# Patient Record
Sex: FEMALE | Race: WHITE | Employment: OTHER | URBAN - METROPOLITAN AREA
[De-identification: names, ages, dates, MRNs, and addresses within clinical notes are randomized per-mention and may not be internally consistent; named-entity substitution may affect disease eponyms.]

---

## 2018-10-19 ENCOUNTER — OFFICE VISIT (OUTPATIENT)
Dept: CARDIOLOGY CLINIC | Age: 78
End: 2018-10-19

## 2018-10-19 VITALS
SYSTOLIC BLOOD PRESSURE: 170 MMHG | BODY MASS INDEX: 34.31 KG/M2 | RESPIRATION RATE: 20 BRPM | DIASTOLIC BLOOD PRESSURE: 88 MMHG | WEIGHT: 201 LBS | HEART RATE: 78 BPM | OXYGEN SATURATION: 97 % | HEIGHT: 64 IN

## 2018-10-19 DIAGNOSIS — I10 ESSENTIAL HYPERTENSION: Primary | ICD-10-CM

## 2018-10-19 DIAGNOSIS — E66.09 CLASS 1 OBESITY DUE TO EXCESS CALORIES WITH SERIOUS COMORBIDITY AND BODY MASS INDEX (BMI) OF 34.0 TO 34.9 IN ADULT: ICD-10-CM

## 2018-10-19 RX ORDER — LISINOPRIL 10 MG/1
TABLET ORAL DAILY
COMMUNITY
End: 2018-10-19 | Stop reason: SDUPTHER

## 2018-10-19 RX ORDER — SIMVASTATIN 10 MG/1
TABLET, FILM COATED ORAL
COMMUNITY
End: 2020-12-06

## 2018-10-19 RX ORDER — LISINOPRIL 20 MG/1
20 TABLET ORAL DAILY
Qty: 90 TAB | Refills: 3
Start: 2018-10-19 | End: 2021-05-13 | Stop reason: SDUPTHER

## 2018-10-19 RX ORDER — BISOPROLOL FUMARATE 5 MG/1
5 TABLET ORAL DAILY
COMMUNITY
End: 2020-06-16

## 2018-10-19 RX ORDER — AMLODIPINE BESYLATE 10 MG/1
10 TABLET ORAL DAILY
Qty: 90 TAB | Refills: 3
Start: 2018-10-19 | End: 2018-12-06

## 2018-10-19 NOTE — PATIENT INSTRUCTIONS
Increase Lisinopril to 20 mg/day starting today. Increase Amlodipine to 10 mg/day starting today. From tomorrow, take Amlodipine in the morning and Bisoprolol and Lisinopril before bed  Follow a low sodium diet  Monitor your BP daily and write down values. Call in 2 weeks and tell Jack Pittet your blood pressures  Drink 4-6 8 oz glasses of water per day    [employer] is a chat platform to communicate between your doctor (myself) and you about any medical issues. It is free to join. Please use this for non urgent medical concerns.  If you need me urgently, please call the office at [phone] or call [phone] if it is an emergency    Go to your mobile phone, your activation code specific to me is - [email or web address]

## 2018-10-19 NOTE — PROGRESS NOTES
Visit Vitals  /88   Pulse 78   Resp 20   Ht 5' 4\" (1.626 m)   Wt 201 lb (91.2 kg)   SpO2 97%   BMI 34.50 kg/m²     EKG VO     Headache for 4days  ER visit /98  [facility]

## 2018-10-19 NOTE — PROGRESS NOTES
Carmelo Edge MD [facility]  [address]  [place], [address] [facility]    Office [phone]  Fax [phone]  Cell [phone]    Franck Oneal is a 76 y.o. female self-referred for evaluation of accelerated HTN. Assessment  Encounter Diagnoses   Name Primary?  Essential hypertension Yes    Class 1 obesity due to excess calories with serious comorbidity and body mass index (BMI) of 34.0 to 34.9 in adult      Recommendations:  Chronic HTN with accelerated HTN associated with headache. She is on a multidrug regimen, but on low doses of each drug. She does not have ALLY by hx and follows a fairly low sodium diet. Doubt we are dealing with secondary causes. Stress may be playing a role. Recent echo demonstrated normal LV function without LVH. She has no sxs of HF. Labs from earlier this month demonstrate normal renal function. Will increase Lisinopril to 20 mg/d and amlodipine to 10 mg/d. Continue Bisoprolol 5 mg/d. Instructed her to dose amlodipine in the morning and the other two in the evening. Monitor BP BID. Phone follow up in a few weeks. Follow-up Disposition:  Return in about 4 weeks (around 11/16/2018). Subjective:  Chronic HTN but otherwise no cardiac hx. She had a bout of accelerated HTN in 2/2017 and was started on Amlodipine. She had a second bout on 10/16/18 requiring ED evaluation at [facility]. Her blood pressure was 208/104 with associated with severe headache and nausea. Head CT was negative. Today, she states she continues to have a significant headache and nausea. Before this episode she had been going to the  3x/week, with no exertional symptoms. She has some decreased consumption of liquids due to nausea. She follows a fairly low-sodium diet. She has been under stress since 5/2018 due to family concerns. She has some intermittent ankle swelling and soreness.  Patient denies any exertional chest pain, dyspnea, palpitations, syncope, orthopnea, or paroxysmal nocturnal dyspnea. She does not have ALLY. She is here with her daughter. Cardiac risk factors   HTN yes  DM no  Smoking no    Cardiac testing  EKG 10/19/18 - SR 73, first degree AV block, , otherwise normal  Echo 6/13/18 - EF 72%, mild LAE    Past Medical History:   Diagnosis Date    Baker's cyst of knee     Left    Easy bruising     Hypercholesteremia     Hypertension     Left arm pain     Neck pain     Osteoarthritis of both knees     Rapid pulse     Right elbow pain     Right hand pain     Right lateral epicondylitis     Swelling of joint of left wrist     Thoracic kyphosis     Cervical muscle stiffness    Wears glasses         Current Outpatient Medications   Medication Sig Dispense Refill    lisinopril (PRINIVIL, ZESTRIL) 10 mg tablet Take  by mouth daily.  simvastatin (ZOCOR) 10 mg tablet Take  by mouth nightly.  bisoprolol (ZEBETA) 5 mg tablet Take 5 mg by mouth daily.  amLODIPine (NORVASC) 2.5 mg tablet Take 5 mg by mouth daily.  esomeprazole (NEXIUM) 40 mg capsule Take  by mouth daily.  CALCIUM CARB/VIT D3/MINERALS (CALCIUM-VITAMIN D PO) Take  by mouth.  FOLIC ACID/MULTIVITS-MIN/LUT (CENTRUM SILVER PO) Take  by mouth.  DOCOSAHEXANOIC ACID/EPA (FISH OIL PO) Take  by mouth.  atorvastatin (LIPITOR) 10 mg tablet Take  by mouth daily.  ASPIRIN PO Take 81 mg by mouth. Allergies   Allergen Reactions    Epinephrine Other (comments)     Fast heartbeat    Morphine Not Reported This Time    Novocain [Procaine] Other (comments)     Fast heartbeat    Shellfish Derived Diarrhea and Nausea and Vomiting          Review of Systems  Constitutional: Negative for fever, chills, malaise/fatigue and diaphoresis. Respiratory: Negative for cough, hemoptysis, sputum production, shortness of breath and wheezing.    Cardiovascular: Negative for chest pain, palpitations, orthopnea, claudication, leg swelling and PND.  Gastrointestinal: Negative for heartburn, nausea, vomiting, blood in stool and melena. Genitourinary: Negative for dysuria and flank pain. Musculoskeletal: Negative for joint pain and back pain. Skin: Negative for rash. Neurological: Negative for focal weakness, seizures, loss of consciousness, weakness and headaches. Endo/Heme/Allergies: Does not bruise/bleed easily. Psychiatric/Behavioral: Negative for memory loss. The patient does not have insomnia. Physical Exam    Visit Vitals  /88   Pulse 78   Resp 20   Ht 5' 4\" (1.626 m)   Wt 201 lb (91.2 kg)   SpO2 97%   BMI 34.50 kg/m²     Wt Readings from Last 3 Encounters:   10/19/18 201 lb (91.2 kg)   01/07/15 201 lb (91.2 kg)   12/10/14 201 lb (91.2 kg)      General - well developed well nourished  Neck - JVP normal, thyroid nl  Cardiac - normal S1, S2, no murmurs, rubs or gallops. No clicks  Vascular - carotids without bruits, radials, femorals and pedal pulses equal bilateral  Lungs - clear to auscultation bilaterals, no rales, wheezing or rhonchi  Abd - soft nontender, no HSM, no abd bruits  Extremities - no edema  Skin - no rash  Neuro - nonfocal  Psych - normal mood and affect      Cardiographics  EKG 10/19/18 - SR 73, first degree AV block, , otherwise normal  Echo 6/13/18 - EF 72%, mild LAE     Written by Davis Bermudez, as dictated by Dr. Merlyn Tapia.    Merlyn Tapia MD

## 2018-11-02 ENCOUNTER — TELEPHONE (OUTPATIENT)
Dept: CARDIOLOGY CLINIC | Age: 78
End: 2018-11-02

## 2018-11-02 NOTE — TELEPHONE ENCOUNTER
10/28   /68    74                     129/75    83  10/29         156/80     72                     121/72     68  10/30         137/74      68                     161/80     77  10/31         136/76     70                     141/74    72  11/1           144/75     71                     138/73    72   11/2        137/72       70    Pt BP AM and PM   With HR  Pt complained of some swelling in her feet. She has been keeping them elevated during the day with little improvement.     Earl Gottron advise

## 2018-12-03 ENCOUNTER — OFFICE VISIT (OUTPATIENT)
Dept: CARDIOLOGY CLINIC | Age: 78
End: 2018-12-03

## 2018-12-03 VITALS
HEIGHT: 64 IN | DIASTOLIC BLOOD PRESSURE: 80 MMHG | BODY MASS INDEX: 34.31 KG/M2 | HEART RATE: 66 BPM | WEIGHT: 201 LBS | RESPIRATION RATE: 16 BRPM | SYSTOLIC BLOOD PRESSURE: 156 MMHG

## 2018-12-03 DIAGNOSIS — I10 ESSENTIAL HYPERTENSION: Primary | ICD-10-CM

## 2018-12-03 RX ORDER — GUAIFENESIN AND PHENYLEPHRINE HCL 400; 10 MG/1; MG/1
500 TABLET ORAL
COMMUNITY

## 2018-12-03 NOTE — PROGRESS NOTES
Chief Complaint   Patient presents with    Hypertension    Medication Evaluation     F/U Meds     Leg Swelling     Visit Vitals  /80 (BP 1 Location: Left arm, BP Patient Position: Sitting)   Pulse 66   Resp 16   Ht 5' 4\" (1.626 m)   Wt 201 lb (91.2 kg)   BMI 34.50 kg/m²

## 2018-12-03 NOTE — PROGRESS NOTES
Carmelo Holley, [facility] [address]  [address]  [place], [address] [facility]    Office [phone]  Fax [phone]  Cell [phone]    Ghada Lozoya is a [de-identified] y.o. female. Last seen 2 months ago. Assessment  Encounter Diagnosis   Name Primary?  Essential hypertension Yes     Recommendations:  Chronic HTN, now controlled on intensified combination therapy, most recently with increased Lisinopril 20 mg/d. Amlodipine was initially inceased to 10 mg daily, but caused significant LE edema. She feels well at this time. No further treatment or evaluation is necessary. We did discuss the role of high potassium/magnesium foods as a benefit for HTN. Will extend her follow up to 6 months. Follow-up Disposition:  Return in about 6 months (around 6/3/2019). Subjective:  Ms. Gretchen Ibarra states she is doing well. She has not had any recurrent headaches. BPs in the morning over the past 10 days have been in the 120-140 range, similar in the afternoon. She has no dizziness. She has some LE edema that worsens in the evenings, which improved with decreased Amlodipine. Patient denies any exertional chest pain, dyspnea, palpitations, syncope, orthopnea, or paroxysmal nocturnal dyspnea. She is adherent with fish oil, tumeric and aspirin 81 mg daily. She is here with her daughter.      Cardiac risk factors   HTN yes  DM no  Smoking no    Cardiac testing  EKG 10/19/18 - SR 73, first degree AV block, , otherwise normal  Echo 6/13/18 - EF 72%, mild LAE    Past Medical History:   Diagnosis Date    Baker's cyst of knee     Left    Easy bruising     Hypercholesteremia     Hypertension     Left arm pain     Neck pain     Osteoarthritis of both knees     Rapid pulse     Right elbow pain     Right hand pain     Right lateral epicondylitis     Swelling of joint of left wrist     Thoracic kyphosis     Cervical muscle stiffness    Wears glasses         Current Outpatient Medications   Medication Sig Dispense Refill    apremilast (OTEZLA) 30 mg tab Take 30 mg by mouth daily.  turmeric root extract 500 mg cap Take 500 mg by mouth.  cyanocobalamin, vitamin B-12, (VITAMIN B-12 PO) Take 1,000 mcg by mouth.  cranberry fruit extract (CRANBERRY PO) Take  by mouth.  simvastatin (ZOCOR) 10 mg tablet Take  by mouth nightly.  bisoprolol (ZEBETA) 5 mg tablet Take 5 mg by mouth daily.  amLODIPine (NORVASC) 10 mg tablet Take 1 Tab by mouth daily. (Patient taking differently: Take 5 mg by mouth daily.) 90 Tab 3    lisinopril (PRINIVIL, ZESTRIL) 20 mg tablet Take 1 Tab by mouth daily. 90 Tab 3    esomeprazole (NEXIUM) 40 mg capsule Take  by mouth daily.  CALCIUM CARB/VIT D3/MINERALS (CALCIUM-VITAMIN D PO) Take  by mouth.  DOCOSAHEXANOIC ACID/EPA (FISH OIL PO) Take  by mouth.  FOLIC ACID/MULTIVITS-MIN/LUT (CENTRUM SILVER PO) Take  by mouth. Allergies   Allergen Reactions    Epinephrine Other (comments)     Fast heartbeat    Morphine Not Reported This Time    Novocain [Procaine] Other (comments)     Fast heartbeat    Shellfish Derived Diarrhea and Nausea and Vomiting          Review of Systems  Constitutional: Negative for fever, chills, malaise/fatigue and diaphoresis. Respiratory: Negative for cough, hemoptysis, sputum production, shortness of breath and wheezing. Cardiovascular: Negative for chest pain, palpitations, orthopnea, claudication, leg swelling and PND. Gastrointestinal: Negative for heartburn, nausea, vomiting, blood in stool and melena. Genitourinary: Negative for dysuria and flank pain. Musculoskeletal: Negative for joint pain and back pain. Skin: Negative for rash. Neurological: Negative for focal weakness, seizures, loss of consciousness, weakness and headaches. Endo/Heme/Allergies: Does not bruise/bleed easily. Psychiatric/Behavioral: Negative for memory loss. The patient does not have insomnia.       Physical Exam    Visit Vitals  /80 (BP 1 Location: Left arm, BP Patient Position: Sitting)   Pulse 66   Resp 16   Ht 5' 4\" (1.626 m)   Wt 201 lb (91.2 kg)   BMI 34.50 kg/m²     Wt Readings from Last 3 Encounters:   12/03/18 201 lb (91.2 kg)   10/19/18 201 lb (91.2 kg)   01/07/15 201 lb (91.2 kg)      General - well developed well nourished  Neck - JVP normal, thyroid nl  Cardiac - normal S1, S2, no murmurs, rubs or gallops. No clicks  Vascular - carotids without bruits, radials, femorals and pedal pulses equal bilateral  Lungs - clear to auscultation bilaterals, no rales, wheezing or rhonchi  Abd - soft nontender, no HSM, no abd bruits  Extremities - no edema  Skin - no rash  Neuro - nonfocal  Psych - normal mood and affect      Cardiographics  EKG 10/19/18 - SR 73, first degree AV block, , otherwise normal  Echo 6/13/18 - EF 72%, mild LAE     Written by Su Michel, as dictated by Dr. Gerri Fink.    Gerri Fink MD

## 2018-12-06 RX ORDER — AMLODIPINE BESYLATE 5 MG/1
5 TABLET ORAL DAILY
Qty: 30 TAB | Refills: 5
Start: 2018-12-06 | End: 2018-12-29 | Stop reason: SINTOL

## 2018-12-29 ENCOUNTER — TELEPHONE (OUTPATIENT)
Dept: CARDIOLOGY CLINIC | Age: 78
End: 2018-12-29

## 2018-12-29 RX ORDER — SPIRONOLACTONE 25 MG/1
25 TABLET ORAL DAILY
Qty: 30 TAB | Refills: 3 | Status: SHIPPED | OUTPATIENT
Start: 2018-12-29 | End: 2020-06-16

## 2018-12-29 RX ORDER — CHLORTHALIDONE 25 MG/1
25 TABLET ORAL DAILY
Qty: 30 TAB | Refills: 3 | Status: SHIPPED | OUTPATIENT
Start: 2018-12-29 | End: 2020-06-16

## 2018-12-29 NOTE — TELEPHONE ENCOUNTER
Intolerant of amlodipine due to worsening edema.      Will replace with chlorthalidone 25/d and spironolactone 25/d    Continue lisinopril    Check BMP 10 days after starting Rx    Continue home BP monitoring

## 2019-06-24 ENCOUNTER — TELEPHONE (OUTPATIENT)
Dept: CARDIOLOGY CLINIC | Age: 79
End: 2019-06-24

## 2019-06-24 NOTE — TELEPHONE ENCOUNTER
Patient would like to know if she could be fit in to see Dr. Carline Moe on 9/30/19 along with her sister's Arya Dorsey) appointment that same day at 3:40 in order for them to coordinate transportation.    Phone: [phone]

## 2019-09-30 ENCOUNTER — OFFICE VISIT (OUTPATIENT)
Dept: CARDIOLOGY CLINIC | Age: 79
End: 2019-09-30

## 2019-09-30 VITALS
OXYGEN SATURATION: 97 % | HEART RATE: 66 BPM | DIASTOLIC BLOOD PRESSURE: 78 MMHG | HEIGHT: 64 IN | BODY MASS INDEX: 33.29 KG/M2 | WEIGHT: 195 LBS | SYSTOLIC BLOOD PRESSURE: 120 MMHG

## 2019-09-30 DIAGNOSIS — I10 ESSENTIAL HYPERTENSION: Primary | ICD-10-CM

## 2019-09-30 RX ORDER — AMLODIPINE BESYLATE 2.5 MG/1
TABLET ORAL DAILY
COMMUNITY
End: 2020-06-16

## 2019-09-30 NOTE — LETTER
9/30/19 Patient: Jono Trivedi YOB: 1940 Date of Visit: 9/30/2019 Fazal Petty MD 
[address] [id number] VIA Facsimile: [phone] Dear Fazal Petty MD, Thank you for referring Ms. Rosa Sanchez to [facility] for evaluation. My notes for this consultation are attached. If you have questions, please do not hesitate to call me. I look forward to following your patient along with you. Sincerely, Umm Gil MD

## 2019-09-30 NOTE — PROGRESS NOTES
Patient says she has no cardiac complaints today       Visit Vitals  /78 (BP 1 Location: Left arm, BP Patient Position: Sitting)   Pulse 66   Ht 5' 4\" (1.626 m)   Wt 195 lb (88.5 kg)   SpO2 97%   BMI 33.47 kg/m²

## 2019-09-30 NOTE — PROGRESS NOTES
Dawna Andrews [address]  [address], [facility], [address] [facility]    Office [phone]  Fax [phone]  Cell [phone]      Jono Trivedi is a [de-identified] y.o. female. Last seen by me 10 months ago. Assessment  Encounter Diagnosis   Name Primary?  Essential hypertension Yes     Recommendations:    Chronic HTN, largely controlled with combination therapy. Continue Amlodipine 2.5mg, Zebeta 5mg Lisinopril 20mg. Occasional low BP. I encouraged that she stay hydrated. Will extend her follow up to 1 year, sooner PRN. Subjective:    Jono Trivedi reports she is feeling well overall. She said some days are better than others. Patient denies any exertional chest pain, dyspnea, palpitations, syncope, orthopnea, or paroxysmal nocturnal dyspnea. BP's at home have largely been in the 919-256 range systolic, sometimes 481'E. Occasionally she has had systolics in the 94C, symptomatic, improved with hydration. She is adherent with fish oil, tumeric and ASA 81mg/d. Cardiac risk factors   HTN yes  DM no  Smoking no    Cardiac testing  EKG 10/19/18 - SR 73, first degree AV block, , otherwise normal  Echo 6/13/18 - EF 72%, mild LAE    Past Medical History:   Diagnosis Date    Baker's cyst of knee     Left    Easy bruising     Hypercholesteremia     Hypertension     Left arm pain     Neck pain     Osteoarthritis of both knees     Rapid pulse     Right elbow pain     Right hand pain     Right lateral epicondylitis     Swelling of joint of left wrist     Thoracic kyphosis     Cervical muscle stiffness    Wears glasses         Current Outpatient Medications   Medication Sig Dispense Refill    amLODIPine (NORVASC) 2.5 mg tablet Take  by mouth daily.  apremilast (OTEZLA) 30 mg tab Take 30 mg by mouth daily.  turmeric root extract 500 mg cap Take 500 mg by mouth.  cyanocobalamin, vitamin B-12, (VITAMIN B-12 PO) Take 1,000 mcg by mouth.       cranberry fruit extract (CRANBERRY PO) Take  by mouth.  simvastatin (ZOCOR) 10 mg tablet Take  by mouth nightly.  bisoprolol (ZEBETA) 5 mg tablet Take 5 mg by mouth daily.  lisinopril (PRINIVIL, ZESTRIL) 20 mg tablet Take 1 Tab by mouth daily. 90 Tab 3    esomeprazole (NEXIUM) 40 mg capsule Take 20 mg by mouth daily.  CALCIUM CARB/VIT D3/MINERALS (CALCIUM-VITAMIN D PO) Take  by mouth.  DOCOSAHEXANOIC ACID/EPA (FISH OIL PO) Take  by mouth.  chlorthalidone (HYGROTEN) 25 mg tablet Take 1 Tab by mouth daily. 30 Tab 3    spironolactone (ALDACTONE) 25 mg tablet Take 1 Tab by mouth daily. 30 Tab 3    FOLIC ACID/MULTIVITS-MIN/LUT (CENTRUM SILVER PO) Take  by mouth. Allergies   Allergen Reactions    Epinephrine Other (comments)     Fast heartbeat    Morphine Not Reported This Time    Novocain [Procaine] Other (comments)     Fast heartbeat    Shellfish Derived Diarrhea and Nausea and Vomiting          Review of Systems  Constitutional: Negative for fever, chills, malaise/fatigue and diaphoresis. Respiratory: Negative for cough, hemoptysis, sputum production, shortness of breath and wheezing. Cardiovascular: Negative for chest pain, palpitations, orthopnea, claudication, leg swelling and PND. Gastrointestinal: Negative for heartburn, nausea, vomiting, blood in stool and melena. Genitourinary: Negative for dysuria and flank pain. Musculoskeletal: Negative for joint pain and back pain. Skin: Negative for rash. Neurological: Negative for focal weakness, seizures, loss of consciousness, weakness and headaches. Endo/Heme/Allergies: Does not bruise/bleed easily. Psychiatric/Behavioral: Negative for memory loss. The patient does not have insomnia.       Physical Exam    Visit Vitals  /78 (BP 1 Location: Left arm, BP Patient Position: Sitting)   Pulse 66   Ht 5' 4\" (1.626 m)   Wt 195 lb (88.5 kg)   SpO2 97%   BMI 33.47 kg/m²     Wt Readings from Last 3 Encounters:   09/30/19 195 lb (88.5 kg)   12/03/18 201 lb (91.2 kg)   10/19/18 201 lb (91.2 kg)      General - well developed well nourished  Neck - JVP normal, thyroid nl  Cardiac - normal S1, S2, no murmurs, rubs or gallops. No clicks  Vascular - carotids without bruits, radials, femorals and pedal pulses equal bilateral  Lungs - clear to auscultation bilaterals, no rales, wheezing or rhonchi  Abd - soft nontender, no HSM, no abd bruits  Extremities - no edema  Skin - no rash  Neuro - nonfocal  Psych - normal mood and affect      Cardiographics  EKG 10/19/18 - SR 73, first degree AV block, , otherwise normal  Echo 6/13/18 - EF 72%, mild LAE   EKG 9/30/19 - SR 71,     Written by Vishnu Richards, as dictated by Emily Molina M.D.      Emily Molina MD

## 2020-06-12 ENCOUNTER — TELEPHONE (OUTPATIENT)
Dept: CARDIOLOGY CLINIC | Age: 80
End: 2020-06-12

## 2020-06-12 NOTE — TELEPHONE ENCOUNTER
----- Message from Sam Peterson MD sent at 6/12/2020  9:40 AM EDT -----  Regarding: RE: Vascular order for venous duplex of RLE  Yes she needs one. Hx of DVT, followup study    ----- Message -----  From: Claudette Agreste, RN  Sent: 6/12/2020   9:25 AM EDT  To: Sam Peterson MD  Subject: FW: Vascular order for venous duplex of RLE      Did you order this? I don't see any notes either  ----- Message -----  From: Nieves Smith  Sent: 6/11/2020  11:35 AM EDT  To: Trena Carvalho RN  Subject: Vascular order for venous duplex of RLE          Katerine,  I do not see an order or a note for this patient to have a vascular study on 6/16. Could you please help   Me.     Thanks    [id number]

## 2020-06-16 ENCOUNTER — OFFICE VISIT (OUTPATIENT)
Dept: CARDIOLOGY CLINIC | Age: 80
End: 2020-06-16

## 2020-06-16 VITALS
SYSTOLIC BLOOD PRESSURE: 134 MMHG | DIASTOLIC BLOOD PRESSURE: 82 MMHG | WEIGHT: 189 LBS | HEART RATE: 64 BPM | BODY MASS INDEX: 32.27 KG/M2 | OXYGEN SATURATION: 98 % | HEIGHT: 64 IN

## 2020-06-16 DIAGNOSIS — I10 ESSENTIAL HYPERTENSION: Primary | ICD-10-CM

## 2020-06-16 DIAGNOSIS — I82.4Z1 ACUTE DEEP VEIN THROMBOSIS (DVT) OF DISTAL VEIN OF RIGHT LOWER EXTREMITY (HCC): ICD-10-CM

## 2020-06-16 DIAGNOSIS — I83.93 VARICOSE VEINS OF BOTH LOWER EXTREMITIES, UNSPECIFIED WHETHER COMPLICATED: ICD-10-CM

## 2020-06-16 RX ORDER — AMLODIPINE BESYLATE 2.5 MG/1
2.5 TABLET ORAL 2 TIMES DAILY
Qty: 60 TAB | Refills: 3
Start: 2020-06-16 | End: 2020-11-03 | Stop reason: DRUGHIGH

## 2020-06-16 NOTE — PROGRESS NOTES
Carmelo Craig MD [facility]  [address], [facility], [address] [facility]    Office [phone]  Fax [phone]  Cell [phone]      Noris Montgomery is a 78 y.o. female. Last seen by me 10 months ago. Assessment  Encounter Diagnoses   Name Primary?  Essential hypertension Yes    Acute deep vein thrombosis (DVT) of distal vein of right lower extremity (HCC)      Recommendations:    Chronic HTN, largely controlled with combination therapy. - Continue Amlodipine 2.5mg, Lisinopril 20mg. Calf DVT, right. Has a lot of varicose veins. - take aspirin 81/d    Venous insufficiency. Suggest thigh high compression stockings    Palpitations, infrequent at present. At risk for AF due to age, HTN and LAE. If sx progress, will get 30 day monitor    Follow-up and Dispositions    · Return in about 1 year (around 6/16/2021). Subjective:    Noris Montgomery dx with right calf DVT Jan 2020 in [place], started on Eliquis. Also had superficial thrombophlebitis right thigh. Stopped Eliquis last month. Active without exertional sx. Patient denies any exertional chest pain, dyspnea, , syncope, orthopnea, or paroxysmal nocturnal dyspnea. BPs largely 694-346I systolic, occ 235/599Z    occ palpitations at night    occ leg cramps at night. No claudication.      Cardiac risk factors   HTN yes  DM no  Smoking no    Cardiac testing  EKG 10/19/18 - SR 73, first degree AV block, , otherwise normal  Echo 6/13/18 - EF 72%, mild LAE    Past Medical History:   Diagnosis Date    Baker's cyst of knee     Left    Easy bruising     Hypercholesteremia     Hypertension     Left arm pain     Neck pain     Osteoarthritis of both knees     Rapid pulse     Right elbow pain     Right hand pain     Right lateral epicondylitis     Swelling of joint of left wrist     Thoracic kyphosis     Cervical muscle stiffness    Wears glasses         Current Outpatient Medications   Medication Sig Dispense Refill    amLODIPine (NORVASC) 2.5 mg tablet Take 1 Tab by mouth two (2) times a day. 60 Tab 3    apremilast (OTEZLA) 30 mg tab Take 30 mg by mouth daily.  turmeric root extract 500 mg cap Take 500 mg by mouth.  cyanocobalamin, vitamin B-12, (VITAMIN B-12 PO) Take 1,000 mcg by mouth.  cranberry fruit extract (CRANBERRY PO) Take  by mouth.  simvastatin (ZOCOR) 10 mg tablet Take  by mouth nightly.  lisinopril (PRINIVIL, ZESTRIL) 20 mg tablet Take 1 Tab by mouth daily. 90 Tab 3    esomeprazole (NEXIUM) 40 mg capsule Take 20 mg by mouth daily.  CALCIUM CARB/VIT D3/MINERALS (CALCIUM-VITAMIN D PO) Take  by mouth.  DOCOSAHEXANOIC ACID/EPA (FISH OIL PO) Take  by mouth.  FOLIC ACID/MULTIVITS-MIN/LUT (CENTRUM SILVER PO) Take  by mouth. Allergies   Allergen Reactions    Epinephrine Other (comments)     Fast heartbeat    Morphine Not Reported This Time    Novocain [Procaine] Other (comments)     Fast heartbeat    Shellfish Derived Diarrhea and Nausea and Vomiting          Review of Systems  Constitutional: Negative for fever, chills, malaise/fatigue and diaphoresis. Respiratory: Negative for cough, hemoptysis, sputum production, shortness of breath and wheezing. Cardiovascular: Negative for chest pain, palpitations, orthopnea, claudication, and PND. Gastrointestinal: Negative for heartburn, nausea, vomiting, blood in stool and melena. Genitourinary: Negative for dysuria and flank pain. Musculoskeletal: Negative for joint pain and back pain. Skin: Negative for rash. Neurological: Negative for focal weakness, seizures, loss of consciousness, weakness and headaches. Endo/Heme/Allergies: Does not bruise/bleed easily. Psychiatric/Behavioral: Negative for memory loss. The patient does not have insomnia.       Physical Exam    Visit Vitals  /82 (BP 1 Location: Left arm, BP Patient Position: Sitting)   Pulse 64   Ht 5' 4\" (1.626 m)   Wt 189 lb (85.7 kg) SpO2 98%   BMI 32.44 kg/m²     Wt Readings from Last 3 Encounters:   06/16/20 189 lb (85.7 kg)   06/16/20 189 lb (85.7 kg)   09/30/19 195 lb (88.5 kg)      General - well developed well nourished  Neck - JVP normal, thyroid nl  Cardiac - normal S1, S2, no murmurs, rubs or gallops.  No clicks  Vascular - carotids without bruits, radials, femorals and pedal pulses equal bilateral  Lungs - clear to auscultation bilaterals, no rales, wheezing or rhonchi  Abd - soft nontender, no HSM, no abd bruits  Extremities - trace LE edema, + varicose veins  Skin - no rash  Neuro - nonfocal  Psych - normal mood and affect      Cardiographics  EKG 10/19/18 - SR 73, first degree AV block, , otherwise normal  Echo 6/13/18 - EF 72%, mild LAE   EKG 9/30/19 - SR 71,         Ramiro Najera MD

## 2020-06-16 NOTE — PROGRESS NOTES
Nimo Chandra is a 78 y.o. female    Visit Vitals  /82 (BP 1 Location: Left arm, BP Patient Position: Sitting)   Pulse 64   Ht 5' 4\" (1.626 m)   Wt 189 lb (85.7 kg)   SpO2 98%   BMI 32.44 kg/m²       Chief Complaint   Patient presents with    Hypertension       Chest pain no  SOB no  Dizziness no  Swelling no  Recent hospital visit no  Refills no

## 2020-07-17 ENCOUNTER — TELEPHONE (OUTPATIENT)
Dept: CARDIOLOGY CLINIC | Age: 80
End: 2020-07-17

## 2020-07-17 ENCOUNTER — CLINICAL SUPPORT (OUTPATIENT)
Dept: CARDIOLOGY CLINIC | Age: 80
End: 2020-07-17

## 2020-07-17 DIAGNOSIS — R00.2 PALPITATIONS: Primary | ICD-10-CM

## 2020-07-17 DIAGNOSIS — R00.2 PALPITATION: Primary | ICD-10-CM

## 2020-07-17 NOTE — TELEPHONE ENCOUNTER
----- Message from Heena Connell MD sent at 2020 10:02 PM EDT -----  Regard[facility] day loop  Linette     Can you send to her? Dx palpitations      Hi Dr Maik Balderas, I continue to feel like my heart skips a beat at times and then I feel a little dizzy. We discussed a monitor when I was there on . Could you mail me one with instructions to [address] [place] [address], [place], [email or web address]. Thanks so much.  [employer]

## 2020-08-12 RX ORDER — HYDROGEN PEROXIDE 3 %
20 SOLUTION, NON-ORAL MISCELLANEOUS 2 TIMES DAILY
Qty: 180 CAP | Refills: 1 | Status: SHIPPED | OUTPATIENT
Start: 2020-08-12 | End: 2020-11-03 | Stop reason: DRUGHIGH

## 2020-08-28 ENCOUNTER — TELEPHONE (OUTPATIENT)
Dept: CARDIOLOGY CLINIC | Age: 80
End: 2020-08-28

## 2020-08-28 NOTE — TELEPHONE ENCOUNTER
Cardiac testing  EKG 10/19/18 - SR 73, first degree AV block, , otherwise normal  Echo 6/13/18 - EF 72%, mild LAE    30 day event monitor - 7/24/20 - 8/22/20 - evidence of afib; variable rates 100 to 120's. <1% overall burden. No significant bradycardia or pauses. I have notified Ms. Sanchez that her monitor indicated the presence of atrial fibrillation. She continues to experience intermittent palpitations. No tachycardia, syncope or near syncope. She was previously advised to discontinue Bisoprolol, but she felt \"a weird feeling in her chest\" when she discontinued it completely, so she has continued to take 5 mg every other day. We discussed the role of anticoagulation to prevent stroke. Recommend Eliquis 5 mg BID. Recent CBC drawn by PCP 6/2020 show H/H 12.8/40.2 and creatinine of 1.0. Weight 85kg. She is hesitant to take Eliquis due to medication interactions with fish oil and tumeric. She also has taken Eliquis early this year due to an acute DVT. She tolerated it well, but \"just isn't sure about being on it long term\". She denies any current bleeding or bruising concerns on ASA. We discussed that we will move forward with scheduling an updated Echo to reassess LV function and atrial size with an office visit to review results and continue to discuss our recommendations for anticoagulation. She is agreeable with this plan. She was encouraged to call back with any additional questions or concerns prior to that time.

## 2020-08-31 ENCOUNTER — TELEPHONE (OUTPATIENT)
Dept: CARDIOLOGY CLINIC | Age: 80
End: 2020-08-31

## 2020-08-31 DIAGNOSIS — I48.91 ATRIAL FIBRILLATION, UNSPECIFIED TYPE (HCC): Primary | ICD-10-CM

## 2020-08-31 NOTE — TELEPHONE ENCOUNTER
Patient is calling to speak with [facility] as she was advised to call and speak with her per Kevin Bender. Please advise.     Phone:  [phone]

## 2020-08-31 NOTE — TELEPHONE ENCOUNTER
Patient's states she was told to schedule an echo? No order in system. Please advise.      Phone: [phone]

## 2020-10-26 ENCOUNTER — ANCILLARY PROCEDURE (OUTPATIENT)
Dept: CARDIOLOGY CLINIC | Age: 80
End: 2020-10-26
Payer: MEDICARE

## 2020-10-26 VITALS
DIASTOLIC BLOOD PRESSURE: 78 MMHG | HEIGHT: 64 IN | BODY MASS INDEX: 31.92 KG/M2 | SYSTOLIC BLOOD PRESSURE: 142 MMHG | WEIGHT: 187 LBS

## 2020-10-26 DIAGNOSIS — I48.91 ATRIAL FIBRILLATION, UNSPECIFIED TYPE (HCC): ICD-10-CM

## 2020-10-26 PROCEDURE — 93306 TTE W/DOPPLER COMPLETE: CPT | Performed by: SPECIALIST

## 2020-10-28 LAB
ECHO AO ROOT DIAM: 3.19 CM
ECHO AV AREA PEAK VELOCITY: 2.34 CM2
ECHO AV AREA VTI: 2.43 CM2
ECHO AV AREA/BSA PEAK VELOCITY: 1.2 CM2/M2
ECHO AV AREA/BSA VTI: 1.3 CM2/M2
ECHO AV MEAN GRADIENT: 3.8 MMHG
ECHO AV PEAK GRADIENT: 6.5 MMHG
ECHO AV PEAK VELOCITY: 127.49 CM/S
ECHO AV VTI: 29.48 CM
ECHO EST RA PRESSURE: 3 MMHG
ECHO LA AREA 4C: 19.89 CM2
ECHO LA MAJOR AXIS: 4.46 CM
ECHO LA MINOR AXIS: 2.35 CM
ECHO LA VOL 2C: 74.35 ML (ref 22–52)
ECHO LA VOL 4C: 53.89 ML (ref 22–52)
ECHO LA VOL BP: 70.35 ML (ref 22–52)
ECHO LA VOL/BSA BIPLANE: 37.01 ML/M2 (ref 16–28)
ECHO LA VOLUME INDEX A2C: 39.11 ML/M2 (ref 16–28)
ECHO LA VOLUME INDEX A4C: 28.35 ML/M2 (ref 16–28)
ECHO LV E' LATERAL VELOCITY: 10.39 CM/S
ECHO LV E' SEPTAL VELOCITY: 5.48 CM/S
ECHO LV INTERNAL DIMENSION DIASTOLIC: 4.81 CM (ref 3.9–5.3)
ECHO LV INTERNAL DIMENSION SYSTOLIC: 2.94 CM
ECHO LV IVSD: 0.95 CM (ref 0.6–0.9)
ECHO LV MASS 2D: 170.8 G (ref 67–162)
ECHO LV MASS INDEX 2D: 89.8 G/M2 (ref 43–95)
ECHO LV POSTERIOR WALL DIASTOLIC: 1.05 CM (ref 0.6–0.9)
ECHO LVOT DIAM: 1.9 CM
ECHO LVOT PEAK GRADIENT: 4.48 MMHG
ECHO LVOT PEAK VELOCITY: 105.86 CM/S
ECHO LVOT SV: 71.6 ML
ECHO LVOT VTI: 25.39 CM
ECHO MV A VELOCITY: 67.89 CM/S
ECHO MV AREA PHT: 3.53 CM2
ECHO MV E DECELERATION TIME (DT): 214.63 MS
ECHO MV E VELOCITY: 74.25 CM/S
ECHO MV E/A RATIO: 1.09
ECHO MV E/E' LATERAL: 7.15
ECHO MV E/E' RATIO (AVERAGED): 10.35
ECHO MV E/E' SEPTAL: 13.55
ECHO MV PRESSURE HALF TIME (PHT): 62.24 MS
ECHO RA AREA 4C: 16.51 CM2
ECHO RIGHT VENTRICULAR SYSTOLIC PRESSURE (RVSP): 27.39 MMHG
ECHO RV INTERNAL DIMENSION: 3.96 CM
ECHO RV TAPSE: 1.93 CM (ref 1.5–2)
ECHO TV REGURGITANT MAX VELOCITY: 246.91 CM/S
ECHO TV REGURGITANT PEAK GRADIENT: 24.39 MMHG
LA VOL DISK BP: 65.04 ML (ref 22–52)

## 2020-11-02 NOTE — PROGRESS NOTES
VIRTUAL VISIT DOCUMENTATION     Pursuant to the emergency declaration under the [facility] 2[facility] waiver authority and the via[employer] and [employer] Act, this Virtual  Visit was conducted, with patient's consent, to reduce the patient's risk of exposure to COVID-19 and provide continuity of care for an established patient. Services were provided through a video synchronous discussion virtually to substitute for in-person clinic visit. CHIEF COMPLAINT      James Schmidt is a 78 y.o. female who was seen by synchronous (real-time) audio-video technology on 11/3/2020. Last seen by me 5 months ago in clinic. Here to review recent event monitor. ASSESSMENT        ICD-10-CM ICD-9-CM    1. Paroxysmal atrial fibrillation (HCC)  I48.0 427.31    2. Essential hypertension  I10 401.9    3. Acute deep vein thrombosis (DVT) of distal vein of right lower extremity (HCC)  I82.4Z1 453.42         PLAN     Chronic HTN, largely controlled with combination therapy. - Continue Amlodipine 2.5mg, Lisinopril 10 mg and Ziac 5 mg every other day    - Continue home BP monitoring      PAF documented by event monitor. Recent echo w/ mild LAE. She has infrequent sxs. CHADS2-VASC score = 3. Favor long-term anticoagulation/ Eliquis; however, concerned about recent diarrhea w/ blood. Have asked her to touch base w/ Dr. Demetris Roper to see if further GI evaluation is appropriate before starting anticoagulation.   - Start Eliquis 5 mg BID once cleared by GI  - Stop ASA when starting Eliquis     Hx of right calf DVT Jan 2020 treated w/ Eliquis x3 months. 3 month f/u   __      We discussed the expected course, resolution and complications of the diagnosis(es) in detail. Medication risks, benefits, costs, interactions, and alternatives were discussed as indicated.   I advised her to contact the office if her condition worsens, changes or fails to improve as anticipated. She expressed understanding with the diagnosis(es) and plan    HISTORY OF PRESENTING ILLNESS      Underwent 30 day event monitor in July demonstrating episodes of AF w/ variable rates 100-120 w/ less than 1% overall burden. Repeat echo last month unchanged from 2018 w/ EF 60%, mild LAE. She reports no interval cardiac sxs. Patient denies any exertional chest pain, dyspnea, syncope, orthopnea, edema or paroxysmal nocturnal dyspnea. BP at home 120/70, 154/82, 125/70, 140/80 etc. She states that her BP rarely drops to 98/50 and she does not feel well. She takes bisoprolol 5 mg every other day, lisinopril 10 in the morning, amlodipine 2.5 in the evening. She reports diarrhea all night long Sunday which she attributes to eating too much on her birthday. She expresses some concern over possible blood in her stool. Her last colonoscopy was 10 years ago w/ Dr. Kasandra Werner. Dx with right calf DVT Jan 2020 in [place], started on Eliquis. Also had superficial thrombophlebitis right thigh.      She notes very easy bruising on Eliquis.         ACTIVE PROBLEM LIST     Patient Active Problem List    Diagnosis Date Noted    Paroxysmal atrial fibrillation ([address] 11/08/2020    Essential hypertension 06/16/2020    Acute deep vein thrombosis (DVT) of distal vein of right lower extremity ([address] 06/16/2020    Varicose veins of both lower extremities 06/16/2020           PAST MEDICAL HISTORY     Past Medical History:   Diagnosis Date    Acute deep vein thrombosis (DVT) of distal vein of right lower extremity ([address] 6/16/2020    Baker's cyst of knee     Left    Easy bruising     Essential hypertension 6/16/2020    Hypercholesteremia     Hypertension     Left arm pain     Neck pain     Osteoarthritis of both knees     Rapid pulse     Right elbow pain     Right hand pain     Right lateral epicondylitis     Swelling of joint of left wrist     Thoracic kyphosis     Cervical muscle stiffness    Wears glasses            PAST SURGICAL HISTORY     History reviewed. No pertinent surgical history. ALLERGIES     Allergies   Allergen Reactions    Epinephrine Other (comments)     Fast heartbeat    Morphine Not Reported This Time    Novocain [Procaine] Other (comments)     Fast heartbeat    Shellfish Derived Diarrhea and Nausea and Vomiting          FAMILY HISTORY     Family History   Problem Relation Age of Onset    Heart Disease Son     negative for cardiac disease       SOCIAL HISTORY     Social History     Socioeconomic History    Marital status:      Spouse name: Not on file    Number of children: Not on file    Years of education: Not on file    Highest education level: Not on file   Tobacco Use    Smoking status: Never Smoker    Smokeless tobacco: Never Used   Substance and Sexual Activity    Alcohol use: No         MEDICATIONS     Current Outpatient Medications   Medication Sig    amLODIPine (Norvasc) 2.5 mg tablet Take  by mouth daily.  esomeprazole (NexIUM) 20 mg capsule Take  by mouth daily.  apixaban (ELIQUIS) 5 mg tablet Take 1 Tab by mouth two (2) times a day. Indications: prevention for a blood clot going to the brain    apremilast (OTEZLA) 30 mg tab Take 30 mg by mouth daily.  turmeric root extract 500 mg cap Take 500 mg by mouth.  cyanocobalamin, vitamin B-12, (VITAMIN B-12 PO) Take 1,000 mcg by mouth.  cranberry fruit extract (CRANBERRY PO) Take  by mouth.  simvastatin (ZOCOR) 10 mg tablet Take  by mouth nightly.  lisinopril (PRINIVIL, ZESTRIL) 20 mg tablet Take 1 Tab by mouth daily.  CALCIUM CARB/VIT D3/MINERALS (CALCIUM-VITAMIN D PO) Take  by mouth.  FOLIC ACID/MULTIVITS-MIN/LUT (CENTRUM SILVER PO) Take  by mouth.  DOCOSAHEXANOIC ACID/EPA (FISH OIL PO) Take  by mouth. No current facility-administered medications for this visit.         I have reviewed the nurses notes, vitals, problem list, allergy list, medical history, family, social history and medications. REVIEW OF SYMPTOMS      General: Pt denies excessive weight gain or loss. Pt is able to conduct ADL's  HEENT: Denies blurred vision, headaches, hearing loss, epistaxis and difficulty swallowing. Respiratory: Denies cough, congestion, shortness of breath, CAMPBELL, wheezing or stridor. Cardiovascular: Denies precordial pain, palpitations, edema or PND  Gastrointestinal: Denies poor appetite, abdominal pain +diarrhea, blood in stool  Genitourinary: Denies hematuria, dysuria, increased urinary frequency  Musculoskeletal: Denies joint pain or swelling from muscles or joints  Neurologic: Denies tremor, paresthesias, headache, or sensory motor disturbance  Psychiatric: Denies confusion, insomnia, depression  Integumentray: Denies rash, itching or ulcers. Hematologic: +easy bruising        PHYSICAL EXAMINATION      Due to this being a TeleHealth evaluation, many elements of the physical examination are unable to be assessed. General: Well developed, in no acute distress, cooperative and alert  HEENT: Pupils equal/round. No marked JVD visible on video. Respiratory: No audible wheezing, no signs of respiratory distress, lips non cyanotic  Extremities:  No edema  Neuro: A&Ox3, speech clear, no facial droop, answering questions appropriately  Skin: Skin color is normal. No rashes or lesions. Non diaphoretic on visible skin during exam       DIAGNOSTIC DATA      EKG 10/19/18 - SR 73, first degree AV block, , otherwise normal  Echo 6/13/18 - EF 72%, mild LAE    30 day event monitor - 7/24/20 - 8/22/20 - evidence of afib; variable rates 100 to 120's. <1% overall burden. No significant bradycardia or pauses.        LABORATORY DATA    No results found for: WBC, HGBPOC, HGB, HGBP, HCTPOC, HCT, PHCT, RBCH, PLT, MCV, HGBEXT, HCTEXT, PLTEXT, HGBEXT, HCTEXT, PLTEXT   No results found for: NA, K, CL, CO2, AGAP, GLU, BUN, CREA, BUCR, GFRAA, GFRNA, CA, TBIL, TBILI, AP, TP, ALB, GLOB, AGRAT, ALT FOLLOW-UP        Patient was made aware and verbalized understanding that an appointment will be scheduled for them for a virtual visit and/or office visit within the above time frame. Patient understanding his/her responsibility to call and change time/date if he/she so chooses. Thank you, Pradeep Walters MD for allowing me to participate in the care of [facility]. Please do not hesitate to contact me for further questions/concerns. Written by Luh Talley, as dictated by Efrem Gamez M.D. Efrem Gamez MD    [facility].  [facility] 104, [address] [facility], [address], [address] [place] [address]  [phone] / [phone] Fax  [phone] / [phone] Fax        Greater than 20 minutes was spent in direct video patient care, planning and chart review. This visit was conducted using [employer] telemedicine services.

## 2020-11-03 ENCOUNTER — VIRTUAL VISIT (OUTPATIENT)
Dept: CARDIOLOGY CLINIC | Age: 80
End: 2020-11-03
Payer: MEDICARE

## 2020-11-03 DIAGNOSIS — I82.4Z1 ACUTE DEEP VEIN THROMBOSIS (DVT) OF DISTAL VEIN OF RIGHT LOWER EXTREMITY (HCC): ICD-10-CM

## 2020-11-03 DIAGNOSIS — I10 ESSENTIAL HYPERTENSION: ICD-10-CM

## 2020-11-03 DIAGNOSIS — I48.0 PAROXYSMAL ATRIAL FIBRILLATION (HCC): Primary | ICD-10-CM

## 2020-11-03 PROCEDURE — 1090F PRES/ABSN URINE INCON ASSESS: CPT | Performed by: SPECIALIST

## 2020-11-03 PROCEDURE — G8427 DOCREV CUR MEDS BY ELIG CLIN: HCPCS | Performed by: SPECIALIST

## 2020-11-03 PROCEDURE — G8400 PT W/DXA NO RESULTS DOC: HCPCS | Performed by: SPECIALIST

## 2020-11-03 PROCEDURE — G0463 HOSPITAL OUTPT CLINIC VISIT: HCPCS | Performed by: SPECIALIST

## 2020-11-03 PROCEDURE — G8756 NO BP MEASURE DOC: HCPCS | Performed by: SPECIALIST

## 2020-11-03 PROCEDURE — 99214 OFFICE O/P EST MOD 30 MIN: CPT | Performed by: SPECIALIST

## 2020-11-03 PROCEDURE — G8432 DEP SCR NOT DOC, RNG: HCPCS | Performed by: SPECIALIST

## 2020-11-03 PROCEDURE — 1101F PT FALLS ASSESS-DOCD LE1/YR: CPT | Performed by: SPECIALIST

## 2020-11-03 RX ORDER — HYDROGEN PEROXIDE 3 %
SOLUTION, NON-ORAL MISCELLANEOUS DAILY
COMMUNITY
End: 2021-03-26 | Stop reason: SDUPTHER

## 2020-11-03 RX ORDER — AMLODIPINE BESYLATE 2.5 MG/1
TABLET ORAL DAILY
COMMUNITY
End: 2021-03-13

## 2020-11-03 NOTE — PATIENT INSTRUCTIONS
- Call [phone] to schedule a follow up appointment in 3 months - Do not start Eliquis until we have sorted out your GI situation

## 2020-11-08 PROBLEM — I48.0 PAROXYSMAL ATRIAL FIBRILLATION (HCC): Status: ACTIVE | Noted: 2020-11-08

## 2020-11-08 RX ORDER — BISOPROLOL FUMARATE AND HYDROCHLOROTHIAZIDE 5; 6.25 MG/1; MG/1
1 TABLET ORAL DAILY
Qty: 90 TAB | Refills: 3
Start: 2020-11-08 | End: 2021-02-24

## 2020-11-24 ENCOUNTER — TELEPHONE (OUTPATIENT)
Dept: FAMILY MEDICINE CLINIC | Age: 80
End: 2020-11-24

## 2020-11-25 ENCOUNTER — TELEPHONE (OUTPATIENT)
Dept: FAMILY MEDICINE CLINIC | Age: 80
End: 2020-11-25

## 2020-11-25 DIAGNOSIS — Z12.31 ENCOUNTER FOR SCREENING MAMMOGRAM FOR MALIGNANT NEOPLASM OF BREAST: Primary | ICD-10-CM

## 2020-11-25 NOTE — TELEPHONE ENCOUNTER
Order placed for mammogram, per Verbal Order from Dr. Shala Garzon on 11/25/2020. Provider is aware of last labs, office visit and follow up. No further action requested from provider.

## 2020-12-06 RX ORDER — SIMVASTATIN 10 MG/1
TABLET, FILM COATED ORAL
Qty: 90 TAB | Refills: 3 | Status: SHIPPED | OUTPATIENT
Start: 2020-12-06 | End: 2022-03-17 | Stop reason: SDUPTHER

## 2021-01-04 ENCOUNTER — VIRTUAL VISIT (OUTPATIENT)
Dept: FAMILY MEDICINE CLINIC | Age: 81
End: 2021-01-04
Payer: MEDICARE

## 2021-01-04 DIAGNOSIS — E78.01 FAMILIAL HYPERCHOLESTEROLEMIA: ICD-10-CM

## 2021-01-04 DIAGNOSIS — E55.9 HYPOVITAMINOSIS D: ICD-10-CM

## 2021-01-04 DIAGNOSIS — M06.9 RHEUMATOID ARTHRITIS, INVOLVING UNSPECIFIED SITE, UNSPECIFIED WHETHER RHEUMATOID FACTOR PRESENT (HCC): ICD-10-CM

## 2021-01-04 DIAGNOSIS — M06.9 RHEUMATOID ARTHRITIS, INVOLVING UNSPECIFIED SITE, UNSPECIFIED WHETHER RHEUMATOID FACTOR PRESENT (HCC): Primary | ICD-10-CM

## 2021-01-04 DIAGNOSIS — I10 ESSENTIAL (PRIMARY) HYPERTENSION: ICD-10-CM

## 2021-01-04 PROCEDURE — 99443 PR PHYS/QHP TELEPHONE EVALUATION 21-30 MIN: CPT | Performed by: FAMILY MEDICINE

## 2021-01-04 RX ORDER — PREDNISONE 20 MG/1
TABLET ORAL
Qty: 12 TAB | Refills: 0 | Status: SHIPPED | OUTPATIENT
Start: 2021-01-04 | End: 2021-02-24

## 2021-01-04 RX ORDER — FUROSEMIDE 20 MG/1
TABLET ORAL
Qty: 20 TAB | Refills: 1 | Status: SHIPPED | OUTPATIENT
Start: 2021-01-04 | End: 2022-02-09 | Stop reason: SDUPTHER

## 2021-01-04 NOTE — PROGRESS NOTES
Jazlyn Romero is a 78 y.o. female, evaluated via audio-only technology on 1/4/2021 for Joint Pain  . Assessment & Plan: Rheumatoid arthritis, hypertension, paroxysmal atrial fibrillation; the patient can be started on a short course of prednisone. She is at significant arthritis were going to continue to check her blood work including sed rate and complete lipid panel. Lasix needs to be refilled as well. This was a 25-minute visit with voice to voice communication the patient at her home I was in my office work will be obtained I will call the patient with results she may need to see rheumatology again patient continues to talk about family issues she continued to talk about issues in life she does not seem to have clinical anxiety or depression but she seems to be lonely during this Covid ask problem I have asked her to stay safe and healthy and away from other people. 12  Subjective: Patient is an 28-year-old female who is seen for evaluation. No nausea vomiting or diarrhea she has had worsening of her arthritis no fever or chills no chest pain or shortness of breath eating relatively well. Bowel movements have been appropriate. She would like Lasix refill for as needed use eating well. Bowel movements have been appropriate. She sleeps well. Sometimes is hard to get moving in the morning. She has had no falls or injuries. She sleeps relatively well she wonders if prednisone would help for short. She uses ibuprofen intermittently she has had no falls or injuries she has had a good holiday with family nobody at home has been sick. Prior to Admission medications    Medication Sig Start Date End Date Taking? Authorizing Provider   simvastatin (ZOCOR) 10 mg tablet TAKE 1 TABLET DAILY 12/6/20  Yes Mayank Polo MD   bisoprolol-hydroCHLOROthiazide [facility]) 5-6.25 mg per tablet Take 1 Tab by mouth daily.  11/8/20  Yes Maurilio Garcia MD   amLODIPine (Norvasc) 2.5 mg tablet Take  by mouth daily. Yes Provider, Historical   esomeprazole (NexIUM) 20 mg capsule Take  by mouth daily. Yes Provider, Historical   apixaban (ELIQUIS) 5 mg tablet Take 1 Tab by mouth two (2) times a day. Indications: prevention for a blood clot going to the brain 10/29/20  Yes Nathaniel Amaya MD   apremilast (OTEZLA) 30 mg tab Take 30 mg by mouth daily. Yes Provider, Historical   turmeric root extract 500 mg cap Take 500 mg by mouth. Yes Provider, Historical   cyanocobalamin, vitamin B-12, (VITAMIN B-12 PO) Take 1,000 mcg by mouth. Yes Provider, Historical   cranberry fruit extract (CRANBERRY PO) Take  by mouth. Yes Provider, Historical   lisinopril (PRINIVIL, ZESTRIL) 20 mg tablet Take 1 Tab by mouth daily. 10/19/18  Yes Nathaniel Amaya MD   CALCIUM CARB/VIT D3/MINERALS (CALCIUM-VITAMIN D PO) Take  by mouth. Yes Provider, Historical   FOLIC ACID/MULTIVITS-MIN/LUT (CENTRUM SILVER PO) Take  by mouth. Yes Provider, Historical   DOCOSAHEXANOIC ACID/EPA (FISH OIL PO) Take  by mouth. Yes Provider, Historical     Patient Active Problem List   Diagnosis Code    Essential hypertension I10    Acute deep vein thrombosis (DVT) of distal vein of right lower extremity (HCC) I82.4Z1    Varicose veins of both lower extremities I83.93    Paroxysmal atrial fibrillation (HCC) I48.0    Hypovitaminosis D E55.9    Familial hypercholesterolemia E78.01    Rheumatoid arthritis ([facility] M06.9       Review of Systems   Constitutional: Negative. HENT: Negative. Respiratory: Negative. Gastrointestinal: Negative. Musculoskeletal: Positive for back pain. Neurological: Negative. Psychiatric/Behavioral: Negative. No flowsheet data found. Maira Jensen, who was evaluated through a patient-initiated, synchronous (real-time) audio only encounter, and/or her healthcare decision maker, is aware that it is a billable service, with coverage as determined by her insurance carrier.  She provided verbal consent to proceed: n/a- consent obtained within past 12 months. She has not had a related appointment within my department in the past 7 days or scheduled within the next 24 hours.       Total Time: minutes: 21-30 minutes    Bekah Domínguez MD

## 2021-01-04 NOTE — PROGRESS NOTES
Inge Lombard presents today for   Chief Complaint   Patient presents with    Joint Pain         Depression Screening:  3 most recent PHQ Screens 1/4/2021   Little interest or pleasure in doing things Not at all   Feeling down, depressed, irritable, or hopeless Not at all   Total Score PHQ 2 0       Learning Assessment:  No flowsheet data found. Abuse Screening:  No flowsheet data found. Fall Risk  Fall Risk Assessment, last 12 mths 9/30/2019   Able to walk? Yes   Fall in past 12 months? No       ADL  No flowsheet data found. Health Maintenance reviewed and discussed and ordered per Provider. Health Maintenance Due   Topic Date Due    DTaP/Tdap/Td series (1 - Tdap) 10/29/1959    Shingrix Vaccine Age 50> (1 of 2) 10/29/1988    GLAUCOMA SCREENING Q2Y  10/29/2003    Bone Densitometry (Dexa) Screening  10/29/2003    Pneumococcal 65+ years (1 of 1 - PPSV23) 10/29/2003    Medicare Yearly Exam  03/14/2018    Lipid Screen  03/27/2020    Flu Vaccine (1) 09/01/2020   . Coordination of Care:  1. Have you been to the ER, urgent care clinic since your last visit? Hospitalized since your last visit? nio    2. Have you seen or consulted any other health care providers outside of the [address] [place] [address] since your last visit? Include any pap smears or colon screening.  no    Providers orders his own labs, orders for colonoscopy, mammograms and referrals as needed    Last UDS Checked no  Last Pain contract signed: no

## 2021-01-05 ENCOUNTER — HOSPITAL ENCOUNTER (OUTPATIENT)
Dept: LAB | Age: 81
Discharge: HOME | End: 2021-01-05
Payer: MEDICARE

## 2021-01-05 LAB
ALBUMIN SERPL-MCNC: 3.6 G/DL (ref 3.5–4.7)
ALBUMIN/GLOB SERPL: 1 {RATIO}
ALP SERPL-CCNC: 78 U/L (ref 38–126)
ALT SERPL-CCNC: 10 U/L (ref 3–52)
ANION GAP SERPL CALC-SCNC: 8 MMOL/L
AST SERPL W P-5'-P-CCNC: 15 U/L (ref 14–74)
BASOPHILS # BLD: 0 K/UL (ref 0–0.1)
BASOPHILS NFR BLD: 0 % (ref 0–2)
BILIRUB SERPL-MCNC: 0.7 MG/DL (ref 0.2–1)
BUN SERPL-MCNC: 13 MG/DL (ref 9–21)
BUN/CREAT SERPL: 16
CA-I BLD-MCNC: 9.5 MG/DL (ref 8.5–10.5)
CHLORIDE SERPL-SCNC: 104 MMOL/L (ref 94–111)
CO2 SERPL-SCNC: 28 MMOL/L (ref 21–33)
CREAT SERPL-MCNC: 0.8 MG/DL (ref 0.7–1.2)
EOSINOPHIL # BLD: 0.1 K/UL (ref 0–0.4)
EOSINOPHIL NFR BLD: 2 % (ref 0–5)
ERYTHROCYTE [DISTWIDTH] IN BLOOD BY AUTOMATED COUNT: 14.2 % (ref 11.6–14.5)
ERYTHROCYTE [SEDIMENTATION RATE] IN BLOOD: 74 MM/HR
GLOBULIN SER CALC-MCNC: 3.6 G/DL
GLUCOSE SERPL-MCNC: 92 MG/DL (ref 70–110)
HCT VFR BLD AUTO: 39.2 % (ref 35–45)
HGB BLD-MCNC: 11.6 G/DL (ref 12–16)
IMM GRANULOCYTES # BLD AUTO: 0 K/UL
IMM GRANULOCYTES NFR BLD AUTO: 0 %
LYMPHOCYTES # BLD: 1.5 K/UL (ref 0.9–3.6)
LYMPHOCYTES NFR BLD: 34 % (ref 21–52)
MCH RBC QN AUTO: 30.2 PG (ref 24–34)
MCHC RBC AUTO-ENTMCNC: 29.6 G/DL (ref 31–37)
MCV RBC AUTO: 102.1 FL (ref 74–97)
MONOCYTES # BLD: 0.4 K/UL (ref 0.05–1.2)
MONOCYTES NFR BLD: 8 % (ref 3–10)
NEUTS SEG # BLD: 2.5 K/UL (ref 1.8–8)
NEUTS SEG NFR BLD: 56 % (ref 40–73)
PLATELET # BLD AUTO: 244 K/UL (ref 135–420)
PMV BLD AUTO: 12.7 FL (ref 9.2–11.8)
POTASSIUM SERPL-SCNC: 4.6 MMOL/L (ref 3.2–5.1)
PROT SERPL-MCNC: 7.2 G/DL (ref 6.1–8.4)
RBC # BLD AUTO: 3.84 M/UL (ref 4.2–5.3)
SODIUM SERPL-SCNC: 140 MMOL/L (ref 135–145)
WBC # BLD AUTO: 4.5 K/UL (ref 4.6–13.2)

## 2021-01-05 PROCEDURE — 86141 C-REACTIVE PROTEIN HS: CPT

## 2021-01-05 PROCEDURE — 80053 COMPREHEN METABOLIC PANEL: CPT

## 2021-01-05 PROCEDURE — 82306 VITAMIN D 25 HYDROXY: CPT

## 2021-01-05 PROCEDURE — 85651 RBC SED RATE NONAUTOMATED: CPT

## 2021-01-05 PROCEDURE — 85025 COMPLETE CBC W/AUTO DIFF WBC: CPT

## 2021-01-05 PROCEDURE — 80061 LIPID PANEL: CPT

## 2021-01-05 PROCEDURE — 36415 COLL VENOUS BLD VENIPUNCTURE: CPT

## 2021-01-05 PROCEDURE — 86038 ANTINUCLEAR ANTIBODIES: CPT

## 2021-01-06 LAB
25(OH)D3 SERPL-MCNC: 39.2 NG/ML (ref 30–100)
CHOLEST SERPL-MCNC: 143 MG/DL
CRP SERPL HS-MCNC: >9.5 MG/L
HDLC SERPL-MCNC: 53 MG/DL
HDLC SERPL: 2.7 {RATIO} (ref 0–5)
LDLC SERPL CALC-MCNC: 76.2 MG/DL (ref 0–100)
LIPID PROFILE,FLP: NORMAL
TRIGL SERPL-MCNC: 69 MG/DL (ref ?–150)
VLDLC SERPL CALC-MCNC: 13.8 MG/DL

## 2021-01-07 LAB — ANA SER QL: NEGATIVE

## 2021-01-21 ENCOUNTER — TELEPHONE (OUTPATIENT)
Dept: FAMILY MEDICINE CLINIC | Age: 81
End: 2021-01-21

## 2021-01-21 NOTE — TELEPHONE ENCOUNTER
Patient today her JODI was normal the C-reactive protein elevated she is seen to rheumatologist they both tell her not to worry about this finding she is already in [place] and one in [place] she does not want to see another I actually placed her on a course of prednisone for myalgia I am thinking we may still need to see a different rheumatologist.  Is aware

## 2021-01-26 RX ORDER — BISOPROLOL FUMARATE 5 MG/1
TABLET ORAL
Qty: 180 TAB | Refills: 3 | Status: SHIPPED | OUTPATIENT
Start: 2021-01-26

## 2021-02-24 ENCOUNTER — VIRTUAL VISIT (OUTPATIENT)
Dept: FAMILY MEDICINE CLINIC | Age: 81
End: 2021-02-24
Payer: MEDICARE

## 2021-02-24 DIAGNOSIS — M06.9 RHEUMATOID ARTHRITIS, INVOLVING UNSPECIFIED SITE, UNSPECIFIED WHETHER RHEUMATOID FACTOR PRESENT (HCC): ICD-10-CM

## 2021-02-24 DIAGNOSIS — E78.01 FAMILIAL HYPERCHOLESTEROLEMIA: ICD-10-CM

## 2021-02-24 DIAGNOSIS — I82.4Z1 ACUTE DEEP VEIN THROMBOSIS (DVT) OF DISTAL VEIN OF RIGHT LOWER EXTREMITY (HCC): ICD-10-CM

## 2021-02-24 DIAGNOSIS — I10 ESSENTIAL HYPERTENSION: Primary | ICD-10-CM

## 2021-02-24 PROCEDURE — 99443 PR PHYS/QHP TELEPHONE EVALUATION 21-30 MIN: CPT | Performed by: FAMILY MEDICINE

## 2021-02-24 NOTE — PROGRESS NOTES
Ara Hay presents today for   Chief Complaint   Patient presents with    Follow-up         Depression Screening:  3 most recent PHQ Screens 2/24/2021   Little interest or pleasure in doing things Not at all   Feeling down, depressed, irritable, or hopeless Not at all   Total Score PHQ 2 0       Fall Risk  Fall Risk Assessment, last 12 mths 2/24/2021   Able to walk? Yes   Fall in past 12 months? 0   Do you feel unsteady? 0   Are you worried about falling 0       Health Maintenance reviewed and discussed and ordered per Provider. Health Maintenance Due   Topic Date Due    COVID-19 Vaccine (1 of 2) 10/29/1956    DTaP/Tdap/Td series (1 - Tdap) 10/29/1959    Shingrix Vaccine Age 50> (1 of 2) 10/29/1988    GLAUCOMA SCREENING Q2Y  10/29/2003    Bone Densitometry (Dexa) Screening  10/29/2003    Pneumococcal 65+ years (1 of 1 - PPSV23) 10/29/2003    Medicare Yearly Exam  03/14/2018    Flu Vaccine (1) 09/01/2020   . Coordination of Care:  1. Have you been to the ER, urgent care clinic since your last visit? Hospitalized since your last visit? No    2. Have you seen or consulted any other health care providers outside of the [address] [place] [address] since your last visit? Include any pap smears or colon screening.  No

## 2021-02-24 NOTE — PROGRESS NOTES
Rosanne Garcia is a 78 y.o. female, evaluated via audio-only technology on 2/24/2021 for Follow-up  . Assessment & Plan:hypertension, rheumatoid arthritis, elevated sed rate, migrating polyarthropathy: Patient is seen by telephone to telephone interaction was a 25-minute visit she likes to talk aggressively. Allowed her to ventilate regarding issues she has had her first Covid shot going to get her family to contact a rheumatologist in the [place] area I think we need a third opinion on this. We need some better direction and we have gotten from her other to rheumatologist.  A sed rate was 74 and has been elevated going up and going down. 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 No chest pain. Last sed rate was 84. She is sleeping well eating well. Tries to walk every day. Bowel movements are appropriate not need any refills of her medication at this time no falls or injuries. Prior to Admission medications    Medication Sig Start Date End Date Taking? Authorizing Provider   bisoprolol (ZEBETA) 5 mg tablet TAKE 1 TABLET TWICE A DAY 1/26/21  Yes Luis Camejo MD   furosemide (LASIX) 20 mg tablet As needed as needed 1/4/21  Yes Luis Camejo MD   simvastatin (ZOCOR) 10 mg tablet TAKE 1 TABLET DAILY 12/6/20  Yes Luis Camejo MD   amLODIPine (Norvasc) 2.5 mg tablet Take  by mouth daily. Yes Provider, Historical   esomeprazole (NexIUM) 20 mg capsule Take  by mouth daily. Yes Provider, Historical   apremilast (OTEZLA) 30 mg tab Take 30 mg by mouth daily. Yes Provider, Historical   turmeric root extract 500 mg cap Take 500 mg by mouth. Yes Provider, Historical   cyanocobalamin, vitamin B-12, (VITAMIN B-12 PO) Take 1,000 mcg by mouth. Yes Provider, Historical   cranberry fruit extract (CRANBERRY PO) Take  by mouth. Yes Provider, Historical   lisinopril (PRINIVIL, ZESTRIL) 20 mg tablet Take 1 Tab by mouth daily. 10/19/18  Yes Fransisco Estrada MD   CALCIUM CARB/VIT D3/MINERALS (CALCIUM-VITAMIN D PO) Take  by mouth. Yes Provider, Historical   DOCOSAHEXANOIC ACID/EPA (FISH OIL PO) Take  by mouth. Yes Provider, Historical     Patient Active Problem List   Diagnosis Code    Essential hypertension I10    Acute deep vein thrombosis (DVT) of distal vein of right lower extremity (HCC) I82.4Z1    Varicose veins of both lower extremities I83.93    Paroxysmal atrial fibrillation (HCC) I48.0    Hypovitaminosis D E55.9    Familial hypercholesterolemia E78.01    Rheumatoid arthritis ([facility] M06.9       Review of Systems   Constitutional: Negative. Eyes: Negative. Respiratory: Negative. Cardiovascular: Negative. Gastrointestinal: Negative. Genitourinary: Negative. Musculoskeletal: Positive for joint pain. Skin: Negative. Neurological: Negative. Endo/Heme/Allergies: Negative. Psychiatric/Behavioral: The patient is nervous/anxious. Patient-Reported Vitals 2/24/2021   Patient-Reported Weight 184   Patient-Reported Systolic  221   Patient-Reported Diastolic 72        Abner Figueroa, who was evaluated through a patient-initiated, synchronous (real-time) audio only encounter, and/or her healthcare decision maker, is aware that it is a billable service, with coverage as determined by her insurance carrier. She provided verbal consent to proceed: Yes. She has not had a related appointment within my department in the past 7 days or scheduled within the next 24 hours.       Total Time: minutes: 21-30 minutes    Rob Layne MD

## 2021-02-25 ENCOUNTER — HOSPITAL ENCOUNTER (OUTPATIENT)
Dept: VASCULAR SURGERY | Age: 81
Discharge: HOME | End: 2021-02-25
Attending: FAMILY MEDICINE
Payer: MEDICARE

## 2021-02-25 DIAGNOSIS — I82.4Z1 ACUTE DEEP VEIN THROMBOSIS (DVT) OF DISTAL VEIN OF RIGHT LOWER EXTREMITY (HCC): ICD-10-CM

## 2021-02-25 PROCEDURE — 93971 EXTREMITY STUDY: CPT

## 2021-03-08 ENCOUNTER — TRANSCRIBE ORDER (OUTPATIENT)
Dept: REGISTRATION | Age: 81
End: 2021-03-08

## 2021-03-08 ENCOUNTER — HOSPITAL ENCOUNTER (OUTPATIENT)
Dept: LAB | Age: 81
Discharge: HOME | End: 2021-03-08
Payer: MEDICARE

## 2021-03-08 ENCOUNTER — HOSPITAL ENCOUNTER (OUTPATIENT)
Dept: GENERAL RADIOLOGY | Age: 81
Discharge: HOME | End: 2021-03-08
Payer: MEDICARE

## 2021-03-08 DIAGNOSIS — M79.89 SWELLING OF LIMB: Primary | ICD-10-CM

## 2021-03-08 DIAGNOSIS — M79.89 SWELLING OF LIMB: ICD-10-CM

## 2021-03-08 LAB
ERYTHROCYTE [SEDIMENTATION RATE] IN BLOOD: 70 MM/HR
URATE SERPL-MCNC: 5.8 MG/DL (ref 3.5–8.5)

## 2021-03-08 PROCEDURE — 36415 COLL VENOUS BLD VENIPUNCTURE: CPT

## 2021-03-08 PROCEDURE — 73130 X-RAY EXAM OF HAND: CPT

## 2021-03-08 PROCEDURE — 86141 C-REACTIVE PROTEIN HS: CPT

## 2021-03-08 PROCEDURE — 73110 X-RAY EXAM OF WRIST: CPT

## 2021-03-08 PROCEDURE — 85651 RBC SED RATE NONAUTOMATED: CPT

## 2021-03-08 PROCEDURE — 86200 CCP ANTIBODY: CPT

## 2021-03-08 PROCEDURE — 84550 ASSAY OF BLOOD/URIC ACID: CPT

## 2021-03-08 PROCEDURE — 86431 RHEUMATOID FACTOR QUANT: CPT

## 2021-03-09 LAB
CCP IGA+IGG SERPL IA-ACNC: 3 UNITS (ref 0–19)
CRP SERPL HS-MCNC: >9.5 MG/L
RHEUMATOID FACT SERPL-ACNC: 14 IU/ML

## 2021-03-13 RX ORDER — AMLODIPINE BESYLATE 2.5 MG/1
TABLET ORAL
Qty: 180 TAB | Refills: 3 | Status: SHIPPED | OUTPATIENT
Start: 2021-03-13

## 2021-03-18 DIAGNOSIS — F41.9 ANXIETY: Primary | ICD-10-CM

## 2021-03-18 RX ORDER — LORAZEPAM 1 MG/1
1 TABLET ORAL
Qty: 90 TAB | Refills: 1 | Status: SHIPPED | OUTPATIENT
Start: 2021-03-18 | End: 2021-03-26 | Stop reason: SDUPTHER

## 2021-03-26 ENCOUNTER — OFFICE VISIT (OUTPATIENT)
Dept: FAMILY MEDICINE CLINIC | Age: 81
End: 2021-03-26
Payer: MEDICARE

## 2021-03-26 ENCOUNTER — HOSPITAL ENCOUNTER (OUTPATIENT)
Dept: GENERAL RADIOLOGY | Age: 81
Discharge: HOME | End: 2021-03-26
Attending: FAMILY MEDICINE
Payer: MEDICARE

## 2021-03-26 VITALS
BODY MASS INDEX: 31.92 KG/M2 | DIASTOLIC BLOOD PRESSURE: 78 MMHG | HEART RATE: 62 BPM | RESPIRATION RATE: 20 BRPM | OXYGEN SATURATION: 99 % | WEIGHT: 187 LBS | HEIGHT: 64 IN | SYSTOLIC BLOOD PRESSURE: 138 MMHG | TEMPERATURE: 97.6 F

## 2021-03-26 DIAGNOSIS — R06.00 DYSPNEA, UNSPECIFIED TYPE: ICD-10-CM

## 2021-03-26 DIAGNOSIS — F41.9 ANXIETY: ICD-10-CM

## 2021-03-26 DIAGNOSIS — M35.3 POLYMYALGIA RHEUMATICA (HCC): ICD-10-CM

## 2021-03-26 DIAGNOSIS — I10 ESSENTIAL HYPERTENSION: ICD-10-CM

## 2021-03-26 DIAGNOSIS — K21.9 GASTROESOPHAGEAL REFLUX DISEASE WITHOUT ESOPHAGITIS: Primary | ICD-10-CM

## 2021-03-26 PROBLEM — I82.4Z1 ACUTE VENOUS EMBOLISM AND THROMBOSIS OF DEEP VESSELS OF DISTAL END OF RIGHT LOWER EXTREMITY (HCC): Status: ACTIVE | Noted: 2020-01-31

## 2021-03-26 PROBLEM — R60.0 LOCALIZED EDEMA: Status: ACTIVE | Noted: 2018-02-14

## 2021-03-26 PROBLEM — I82.5Y1 CHRONIC DEEP VEIN THROMBOSIS (DVT) OF PROXIMAL VEIN OF RIGHT LOWER EXTREMITY (HCC): Status: ACTIVE | Noted: 2020-03-19

## 2021-03-26 PROCEDURE — 71046 X-RAY EXAM CHEST 2 VIEWS: CPT

## 2021-03-26 PROCEDURE — G8427 DOCREV CUR MEDS BY ELIG CLIN: HCPCS | Performed by: FAMILY MEDICINE

## 2021-03-26 PROCEDURE — 1090F PRES/ABSN URINE INCON ASSESS: CPT | Performed by: FAMILY MEDICINE

## 2021-03-26 PROCEDURE — 1101F PT FALLS ASSESS-DOCD LE1/YR: CPT | Performed by: FAMILY MEDICINE

## 2021-03-26 PROCEDURE — G8752 SYS BP LESS 140: HCPCS | Performed by: FAMILY MEDICINE

## 2021-03-26 PROCEDURE — 99214 OFFICE O/P EST MOD 30 MIN: CPT | Performed by: FAMILY MEDICINE

## 2021-03-26 PROCEDURE — G8400 PT W/DXA NO RESULTS DOC: HCPCS | Performed by: FAMILY MEDICINE

## 2021-03-26 PROCEDURE — G8417 CALC BMI ABV UP PARAM F/U: HCPCS | Performed by: FAMILY MEDICINE

## 2021-03-26 PROCEDURE — G8754 DIAS BP LESS 90: HCPCS | Performed by: FAMILY MEDICINE

## 2021-03-26 PROCEDURE — G8536 NO DOC ELDER MAL SCRN: HCPCS | Performed by: FAMILY MEDICINE

## 2021-03-26 PROCEDURE — G8510 SCR DEP NEG, NO PLAN REQD: HCPCS | Performed by: FAMILY MEDICINE

## 2021-03-26 RX ORDER — LORAZEPAM 1 MG/1
1 TABLET ORAL
Qty: 30 TAB | Refills: 2 | Status: SHIPPED | OUTPATIENT
Start: 2021-03-26

## 2021-03-26 RX ORDER — HYDROGEN PEROXIDE 3 %
20 SOLUTION, NON-ORAL MISCELLANEOUS DAILY
Qty: 90 CAP | Refills: 3 | Status: SHIPPED | OUTPATIENT
Start: 2021-03-26 | End: 2022-03-17 | Stop reason: SDUPTHER

## 2021-03-26 NOTE — PROGRESS NOTES
Irena Ramey presents today for   Chief Complaint   Patient presents with    Hypertension    Hand Pain     Rt hand x 1 month       Is someone accompanying this pt? no    Is the patient using any DME equipment during OV? no    Depression Screening:  3 most recent PHQ Screens 3/26/2021   Little interest or pleasure in doing things Not at all   Feeling down, depressed, irritable, or hopeless Not at all   Total Score PHQ 2 0   Trouble falling or staying asleep, or sleeping too much Not at all   Feeling tired or having little energy Not at all   Poor appetite, weight loss, or overeating Not at all   Feeling bad about yourself - or that you are a failure or have let yourself or your family down Not at all   Trouble concentrating on things such as school, work, reading, or watching TV Not at all   Moving or speaking so slowly that other people could have noticed; or the opposite being so fidgety that others notice Not at all   Thoughts of being better off dead, or hurting yourself in some way Not at all   PHQ 9 Score 0   How difficult have these problems made it for you to do your work, take care of your home and get along with others Not difficult at all       Learning Assessment:  No flowsheet data found. Abuse Screening:  Abuse Screening Questionnaire 2/24/2021   Do you ever feel afraid of your partner? N   Are you in a relationship with someone who physically or mentally threatens you? N   Is it safe for you to go home? Y       Fall Risk  Fall Risk Assessment, last 12 mths 3/26/2021   Able to walk? Yes   Fall in past 12 months? 0   Do you feel unsteady? 0   Are you worried about falling 0       Health Maintenance reviewed and discussed and ordered per Provider.     Health Maintenance Due   Topic Date Due    COVID-19 Vaccine (1) Never done    DTaP/Tdap/Td series (1 - Tdap) Never done    Shingrix Vaccine Age 50> (1 of 2) Never done    Bone Densitometry (Dexa) Screening  Never done    Pneumococcal 65+ years (1 of 1 - PPSV23) Never done    Medicare Yearly Exam  Never done    Flu Vaccine (1) Never done   . Coordination of Care:  1. Have you been to the ER, urgent care clinic since your last visit? Hospitalized since your last visit? no    2. Have you seen or consulted any other health care providers outside of the [address] [place] [address] since your last visit? Include any pap smears or colon screening.  no

## 2021-03-26 NOTE — PROGRESS NOTES
Subjective:   Doug Lazo is a 78 y.o. female who was seen for Hypertension and Hand Pain (Rt hand x 1 month)    HPI an 78-year-old female who is seen for evaluation today. She has a multitude of complaints. Sometimes she feels a little short of breath. 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 She has shoulder discomfort she does not have any muscle disorder they ordered lab work from our facility which showed the same things that we have known for years she has an elevated sed rate and elevated CRP she does not have gout. Her renal function has been normal.  She was followed by her cardiologist they did a 30-day monitor and she had one episode of atrial fib. They wanted her on Eliquis she was nervous about taking 5 mg twice a day she wanted to take 2.5 twice a day the recommendation is that with her weight and the fact that her renal function is normal she should be on 5 mg twice daily. I have asked her to consult with her cardiologist about that. They has been negative her rheumatoid factor    Home Medications    Medication Sig Start Date End Date Taking? Authorizing Provider   esomeprazole (NexIUM) 20 mg capsule Take 1 Cap by mouth daily. 3/26/21  Yes Barkley Lanes, MD   LORazepam (ATIVAN) 1 mg tablet Take 1 Tab by mouth every four (4) hours as needed for Anxiety.  3/18/21  Yes Barkley Lanes, MD   amLODIPine (NORVASC) 2.5 mg tablet TAKE 1 TABLET TWICE A DAY 3/13/21  Yes Barkley Lanes, MD   bisoprolol (ZEBETA) 5 mg tablet TAKE 1 TABLET TWICE A DAY 1/26/21  Yes Barkley Lanes, MD   furosemide (LASIX) 20 mg tablet As needed as needed 1/4/21  Yes Chaz Mishra MD DIN[facility]A   simvastatin (ZOCOR) 10 mg tablet TAKE 1 TABLET DAILY 12/6/20  Yes Carole Gutiérrez MD   apremilast (OTEZLA) 30 mg tab Take 30 mg by mouth daily. Yes Provider, Historical   turmeric root extract 500 mg cap Take 500 mg by mouth. Yes Provider, Historical   cyanocobalamin, vitamin B-12, (VITAMIN B-12 PO) Take 1,000 mcg by mouth. Yes Provider, Historical   cranberry fruit extract (CRANBERRY PO) Take  by mouth. Yes Provider, Historical   lisinopril (PRINIVIL, ZESTRIL) 20 mg tablet Take 1 Tab by mouth daily. 10/19/18  Yes Annetta Lux MD   CALCIUM CARB/VIT D3/MINERALS (CALCIUM-VITAMIN D PO) Take  by mouth. Yes Provider, Historical   DOCOSAHEXANOIC ACID/EPA (FISH OIL PO) Take  by mouth. Yes Provider, Historical      Allergies   Allergen Reactions    Epinephrine Other (comments)     Fast heartbeat    Morphine Not Reported This Time    Novocain [Procaine] Other (comments)     Fast heartbeat    Shellfish Derived Diarrhea and Nausea and Vomiting     Social History     Tobacco Use    Smoking status: Never Smoker    Smokeless tobacco: Never Used   Substance Use Topics    Alcohol use: No    Drug use: Never            Review of Systems   Constitutional: Negative. HENT: Negative. Eyes: Negative. Respiratory: Positive for chest tightness. Cardiovascular: Negative. Gastrointestinal: Negative. Endocrine: Negative. Genitourinary: Negative. Musculoskeletal: Positive for arthralgias. Allergic/Immunologic: Negative. Neurological: Negative. Hematological: Negative. Psychiatric/Behavioral: Negative.          Physical Exam   Objective:     Visit Vitals  /78 (BP 1 Location: Left upper arm, BP Patient Position: Sitting, BP Cuff Size: Adult long)   Pulse 62   Temp 97.6 °F (36.4 °C) (Temporal)   Resp 20   Ht 5' 4\" (1.626 m)   Wt 187 lb (84.8 kg)   SpO2 99%   BMI 32.10 kg/m²      General: alert, cooperative, no distress   Mental  status: normal mood, behavior, speech, dress, motor activity, and thought processes, able to follow commands   HENT: NCAT   Neck: no visualized mass   Resp: no respiratory distress   Neuro: no gross deficits   Skin: no discoloration or lesions of concern on visible areas   Psychiatric: normal affect, consistent with stated mood, no evidence of hallucinations   Is slightly obese. She is pleasant and cooperative she has osteoarthritic and rheumatoid changes in the metacarpals on the right hand there is generalized osteoarthritis there is no edema I do not appear to see any significant rashes she is oriented x3 she is not clinically anxious or depressed her vital signs are stable    Assessment & Plan:   Myalgia rheumatica reflux disease hypertension chronic anxiety and dyspnea a chest x-ray will be obtained. She finally has a diagnosis given as polymyalgia rheumatica she does not want to take steroids and they are aware of that. She can use anti-inflammatories intermittently she has been followed by cardiology wants her on 5 mg of Eliquis twice daily she is uncomfortable about that she can take 2-1/2 mg twice daily but it does not satisfy the 3 requirements for someone over 80. She is going to talk with cardiology she is going to get the medication is already prescribed I have refilled her medication for reflux she uses antianxiety medications occasionally but not the 90 tablets the wrong prescription on her chart and we have changed that as well this was a 45-minute visit greater than half the time was spent on consultation          35 20 43    Additional exam findings: We discussed the expected course, resolution and complications of the diagnosis(es) in detail. Medication risks, benefits, costs, interactions, and alternatives were discussed as indicated. I advised her to contact the office if her condition worsens, changes or fails to improve as anticipated. She expressed understanding with the diagnosis(es) and plan.

## 2021-03-30 ENCOUNTER — TELEPHONE (OUTPATIENT)
Dept: FAMILY MEDICINE CLINIC | Age: 81
End: 2021-03-30

## 2021-04-19 ENCOUNTER — TELEPHONE (OUTPATIENT)
Dept: FAMILY MEDICINE CLINIC | Age: 81
End: 2021-04-19

## 2021-04-22 ENCOUNTER — TELEPHONE (OUTPATIENT)
Dept: FAMILY MEDICINE CLINIC | Age: 81
End: 2021-04-22

## 2021-05-13 ENCOUNTER — TELEPHONE (OUTPATIENT)
Dept: FAMILY MEDICINE CLINIC | Age: 81
End: 2021-05-13

## 2021-05-13 DIAGNOSIS — I10 ESSENTIAL HYPERTENSION: Primary | ICD-10-CM

## 2021-05-13 RX ORDER — LISINOPRIL 20 MG/1
20 TABLET ORAL DAILY
Qty: 90 TAB | Refills: 3 | Status: SHIPPED | OUTPATIENT
Start: 2021-05-13 | End: 2021-11-09

## 2021-08-03 PROBLEM — I10 ESSENTIAL HYPERTENSION: Status: RESOLVED | Noted: 2020-06-16 | Resolved: 2021-08-03

## 2021-08-23 NOTE — TELEPHONE ENCOUNTER
Patient called in requesting a refill on promethazine 25mg qty 30. Stated she last had refilled June 2020. Order placed and pending to provider for fill.  bfjak machado

## 2021-08-24 RX ORDER — PROMETHAZINE HYDROCHLORIDE 25 MG/1
25 TABLET ORAL
Qty: 30 TABLET | Refills: 0 | Status: SHIPPED | OUTPATIENT
Start: 2021-08-24 | End: 2021-10-13 | Stop reason: ALTCHOICE

## 2021-09-07 ENCOUNTER — OFFICE VISIT (OUTPATIENT)
Dept: FAMILY MEDICINE CLINIC | Age: 81
End: 2021-09-07
Payer: MEDICARE

## 2021-09-07 ENCOUNTER — HOSPITAL ENCOUNTER (OUTPATIENT)
Dept: LAB | Age: 81
Discharge: HOME | End: 2021-09-07
Payer: MEDICARE

## 2021-09-07 VITALS
OXYGEN SATURATION: 98 % | BODY MASS INDEX: 31.76 KG/M2 | HEART RATE: 63 BPM | WEIGHT: 185 LBS | DIASTOLIC BLOOD PRESSURE: 80 MMHG | SYSTOLIC BLOOD PRESSURE: 138 MMHG

## 2021-09-07 DIAGNOSIS — I82.5Y1 CHRONIC DEEP VEIN THROMBOSIS (DVT) OF PROXIMAL VEIN OF RIGHT LOWER EXTREMITY (HCC): ICD-10-CM

## 2021-09-07 DIAGNOSIS — F41.9 ANXIETY: ICD-10-CM

## 2021-09-07 DIAGNOSIS — M35.3 POLYMYALGIA RHEUMATICA (HCC): ICD-10-CM

## 2021-09-07 DIAGNOSIS — I10 ESSENTIAL HYPERTENSION: ICD-10-CM

## 2021-09-07 DIAGNOSIS — E55.9 HYPOVITAMINOSIS D: ICD-10-CM

## 2021-09-07 DIAGNOSIS — M35.3 POLYMYALGIA RHEUMATICA (HCC): Primary | ICD-10-CM

## 2021-09-07 DIAGNOSIS — I48.0 PAROXYSMAL ATRIAL FIBRILLATION (HCC): ICD-10-CM

## 2021-09-07 DIAGNOSIS — D64.9 ANEMIA, UNSPECIFIED TYPE: ICD-10-CM

## 2021-09-07 DIAGNOSIS — K21.9 GASTROESOPHAGEAL REFLUX DISEASE WITHOUT ESOPHAGITIS: ICD-10-CM

## 2021-09-07 DIAGNOSIS — M05.7A RHEUMATOID ARTHRITIS OF OTHER SITE WITH POSITIVE RHEUMATOID FACTOR (HCC): ICD-10-CM

## 2021-09-07 LAB
25(OH)D3 SERPL-MCNC: 41.1 NG/ML (ref 30–100)
ALBUMIN SERPL-MCNC: 4.1 G/DL (ref 3.5–4.7)
ALBUMIN/GLOB SERPL: 1.2 {RATIO}
ALP SERPL-CCNC: 78 U/L (ref 38–126)
ALT SERPL-CCNC: 13 U/L (ref 3–52)
ANION GAP SERPL CALC-SCNC: 11 MMOL/L
AST SERPL W P-5'-P-CCNC: 18 U/L (ref 14–74)
BILIRUB SERPL-MCNC: 0.7 MG/DL (ref 0.2–1)
BUN SERPL-MCNC: 13 MG/DL (ref 9–21)
BUN/CREAT SERPL: 13
CA-I BLD-MCNC: 10.3 MG/DL (ref 8.5–10.5)
CHLORIDE SERPL-SCNC: 100 MMOL/L (ref 94–111)
CO2 SERPL-SCNC: 27 MMOL/L (ref 21–33)
CREAT SERPL-MCNC: 1 MG/DL (ref 0.7–1.2)
CRP SERPL-MCNC: 1.4 MG/DL (ref 0–1)
ERYTHROCYTE [DISTWIDTH] IN BLOOD BY AUTOMATED COUNT: 14.4 % (ref 11.6–14.5)
ERYTHROCYTE [SEDIMENTATION RATE] IN BLOOD: 55 MM/HR
GLOBULIN SER CALC-MCNC: 3.5 G/DL
GLUCOSE SERPL-MCNC: 83 MG/DL (ref 70–110)
HCT VFR BLD AUTO: 39.8 % (ref 35–45)
HGB BLD-MCNC: 12.7 G/DL (ref 12–16)
MCH RBC QN AUTO: 31.9 PG (ref 24–34)
MCHC RBC AUTO-ENTMCNC: 31.9 G/DL (ref 31–37)
MCV RBC AUTO: 100 FL (ref 78–100)
NRBC # BLD: 0 K/UL (ref 0–0.01)
NRBC BLD-RTO: 0 PER 100 WBC
PLATELET # BLD AUTO: 236 K/UL (ref 135–420)
PMV BLD AUTO: 12.8 FL (ref 9.2–11.8)
POTASSIUM SERPL-SCNC: 4.6 MMOL/L (ref 3.2–5.1)
PROT SERPL-MCNC: 7.6 G/DL (ref 6.1–8.4)
RBC # BLD AUTO: 3.98 M/UL (ref 4.2–5.3)
SODIUM SERPL-SCNC: 138 MMOL/L (ref 135–145)
WBC # BLD AUTO: 4.6 K/UL (ref 4.6–13.2)

## 2021-09-07 PROCEDURE — 86140 C-REACTIVE PROTEIN: CPT

## 2021-09-07 PROCEDURE — 85651 RBC SED RATE NONAUTOMATED: CPT

## 2021-09-07 PROCEDURE — G8536 NO DOC ELDER MAL SCRN: HCPCS | Performed by: FAMILY MEDICINE

## 2021-09-07 PROCEDURE — G8754 DIAS BP LESS 90: HCPCS | Performed by: FAMILY MEDICINE

## 2021-09-07 PROCEDURE — 1090F PRES/ABSN URINE INCON ASSESS: CPT | Performed by: FAMILY MEDICINE

## 2021-09-07 PROCEDURE — G8752 SYS BP LESS 140: HCPCS | Performed by: FAMILY MEDICINE

## 2021-09-07 PROCEDURE — 1101F PT FALLS ASSESS-DOCD LE1/YR: CPT | Performed by: FAMILY MEDICINE

## 2021-09-07 PROCEDURE — G8427 DOCREV CUR MEDS BY ELIG CLIN: HCPCS | Performed by: FAMILY MEDICINE

## 2021-09-07 PROCEDURE — G8417 CALC BMI ABV UP PARAM F/U: HCPCS | Performed by: FAMILY MEDICINE

## 2021-09-07 PROCEDURE — 86235 NUCLEAR ANTIGEN ANTIBODY: CPT

## 2021-09-07 PROCEDURE — 86200 CCP ANTIBODY: CPT

## 2021-09-07 PROCEDURE — 80053 COMPREHEN METABOLIC PANEL: CPT

## 2021-09-07 PROCEDURE — G8510 SCR DEP NEG, NO PLAN REQD: HCPCS | Performed by: FAMILY MEDICINE

## 2021-09-07 PROCEDURE — 82306 VITAMIN D 25 HYDROXY: CPT

## 2021-09-07 PROCEDURE — 99214 OFFICE O/P EST MOD 30 MIN: CPT | Performed by: FAMILY MEDICINE

## 2021-09-07 PROCEDURE — 85027 COMPLETE CBC AUTOMATED: CPT

## 2021-09-07 PROCEDURE — G8400 PT W/DXA NO RESULTS DOC: HCPCS | Performed by: FAMILY MEDICINE

## 2021-09-07 RX ORDER — APIXABAN 5 MG/1
5 TABLET, FILM COATED ORAL 2 TIMES DAILY
COMMUNITY
Start: 2021-08-31

## 2021-09-07 NOTE — PROGRESS NOTES
Subjective:   Partha Robledo is a 78 y.o. female who was seen for Follow-up (follow up on blood pressure and would like to discuss a new provider )    HPI this is a quite tearful patient who I have known for ever and seen through many difficulties. She is here for her last visit together. She has hypertension polymyalgia rheumatica reflux disease some anxiety she has hypovitaminosis D with rheumatoid disease and paroxysmal atrial fibrillation she has chronic DVT and is on Eliquis 2.5 mg twice daily for that she has had no falls or injuries no rash no syncope or loss of consciousness bowel movements have been appropriate. She has some arthritic discomfort she has been seen by 3 different rheumatologist they do not really know her workmen just tell her that her sed rate etc. is elevated. They do not give her any other suggestions. She is 78years of age she does not need aggressive medication. She has had no falls or injuries no rash no syncope no loss of consciousness her bowel movements are appropriate. No chest pain or shortness of breath. No rashes no edema. She is followed by cardiology on a regular basis. Home Medications    Medication Sig Start Date End Date Taking? Authorizing Provider   Eliquis 5 mg tablet  8/31/21  Yes Provider, Historical   lisinopriL (PRINIVIL, ZESTRIL) 20 mg tablet Take 1 Tab by mouth daily. 5/13/21  Yes Chantelle Berrios MD   esomeprazole (NexIUM) 20 mg capsule Take 1 Cap by mouth daily. 3/26/21  Yes Chantelle Berrios MD   LORazepam (ATIVAN) 1 mg tablet Take 1 Tab by mouth daily as needed for Anxiety.  3/26/21  Yes Chantelle Berrios MD   amLODIPine (NORVASC) 2.5 mg tablet TAKE 1 TABLET TWICE A DAY 3/13/21  Yes Chantelle Berrios MD   bisoprolol (ZEBETA) 5 mg tablet TAKE 1 TABLET TWICE A DAY 1/26/21  Yes Chantelle Berrios MD   furosemide (LASIX) 20 mg tablet As needed as needed 1/4/21  Yes Chantelle Berrios MD   simvastatin (ZOCOR) 10 mg tablet TAKE 1 TABLET DAILY 12/6/20  Yes Jh Castellon MD   apremilast (OTEZLA) 30 mg tab Take 30 mg by mouth daily. Yes Provider, Historical   turmeric root extract 500 mg cap Take 500 mg by mouth. Yes Provider, Historical   cyanocobalamin, vitamin B-12, (VITAMIN B-12 PO) Take 1,000 mcg by mouth. Yes Provider, Historical   cranberry fruit extract (CRANBERRY PO) Take  by mouth. Yes Provider, Historical   CALCIUM CARB/VIT D3/MINERALS (CALCIUM-VITAMIN D PO) Take  by mouth. Yes Provider, Historical   DOCOSAHEXANOIC ACID/EPA (FISH OIL PO) Take  by mouth. Yes Provider, Historical   promethazine (PHENERGAN) 25 mg tablet Take 1 Tablet by mouth every six (6) hours as needed for Nausea. Patient not taking: Reported on 9/7/2021 8/24/21   Jh Castellon MD      Allergies   Allergen Reactions    Epinephrine Other (comments)     Fast heartbeat    Morphine Not Reported This Time    Novocain [Procaine] Other (comments)     Fast heartbeat    Shellfish Derived Diarrhea and Nausea and Vomiting     Social History     Tobacco Use    Smoking status: Never Smoker    Smokeless tobacco: Never Used   Substance Use Topics    Alcohol use: No    Drug use: Never            Review of Systems   Constitutional: Positive for fatigue. HENT: Negative. Eyes: Negative. Respiratory: Negative. Cardiovascular: Negative. Gastrointestinal: Negative. Genitourinary: Negative. Musculoskeletal: Positive for arthralgias. Skin: Negative. Allergic/Immunologic: Negative. Neurological: Negative. Hematological: Negative. Psychiatric/Behavioral: The patient is nervous/anxious.          Physical Exam   Objective:     Visit Vitals  /80   Pulse 63   Wt 185 lb (83.9 kg)   SpO2 98%   BMI 31.76 kg/m²      General: alert, cooperative, no distress   Mental  status: normal mood, behavior, speech, dress, motor activity, and thought processes, able to follow commands   HENT: NCAT   Neck: no visualized mass   Resp: no respiratory distress   Neuro: no gross deficits   Skin: no discoloration or lesions of concern on visible areas   Psychiatric: normal affect, consistent with stated mood, no evidence of hallucinations   Afebrile. Vital signs are stable. The pupils are equal and reactive. The chest is clear the cardiovascular exam showed a regular rate and rhythm the abdomen is benign the extremities are clear no thyromegaly no lymphadenopathy no murmurs gallops or rubs. She has degenerative changes arthritic changes in her hands and feet she has no edema her pulses are appropriate she is oriented x3 she is not clinically anxious or depressed    Assessment & Plan:     Hypovitaminosis D DVT history of atrial fibrillation polymyalgia rheumatica hypertension reflux disease and anxiety situational especially today: I have going to get all of her blood work. Her medications were updated this was a 45-minute visit greater than half the time was spent in consultation I allowed the patient to ventilate regarding present issues in her life she was very tearful throughout the evaluation. She is not suicidal or homicidal.  I will call her with her lab work. She is medically stable at this point. She is unclear about who she will see in the future for her physician        35 20 43    Additional exam findings: We discussed the expected course, resolution and complications of the diagnosis(es) in detail. Medication risks, benefits, costs, interactions, and alternatives were discussed as indicated. I advised her to contact the office if her condition worsens, changes or fails to improve as anticipated. She expressed understanding with the diagnosis(es) and plan.

## 2021-09-07 NOTE — PROGRESS NOTES
Ramiro Rai presents today for   Chief Complaint   Patient presents with    Follow-up     follow up on blood pressure and would like to discuss a new provider        Is someone accompanying this pt? No      Is the patient using any DME equipment during OV? No      Depression Screening:  3 most recent PHQ Screens 9/7/2021   Little interest or pleasure in doing things Not at all   Feeling down, depressed, irritable, or hopeless Not at all   Total Score PHQ 2 0   Trouble falling or staying asleep, or sleeping too much -   Feeling tired or having little energy -   Poor appetite, weight loss, or overeating -   Feeling bad about yourself - or that you are a failure or have let yourself or your family down -   Trouble concentrating on things such as school, work, reading, or watching TV -   Moving or speaking so slowly that other people could have noticed; or the opposite being so fidgety that others notice -   Thoughts of being better off dead, or hurting yourself in some way -   PHQ 9 Score -   How difficult have these problems made it for you to do your work, take care of your home and get along with others -       Learning Assessment:  No flowsheet data found. Fall Risk  Fall Risk Assessment, last 12 mths 9/7/2021   Able to walk? Yes   Fall in past 12 months? 0   Do you feel unsteady? 0   Are you worried about falling 0       ADL  No flowsheet data found. Travel Screening:    Travel Screening     Question   Response    In the last month, have you been in contact with someone who was confirmed or suspected to have Coronavirus / COVID-19? No / Unsure    Have you had a COVID-19 viral test in the last 14 days? No    Do you have any of the following new or worsening symptoms? None of these    Have you traveled internationally or domestically in the last month?   No      Travel History   Travel since 08/07/21     No documented travel since 08/07/21          Health Maintenance reviewed and discussed and ordered per Provider. Health Maintenance Due   Topic Date Due    COVID-19 Vaccine (1) Never done    DTaP/Tdap/Td series (1 - Tdap) Never done    Shingrix Vaccine Age 50> (1 of 2) Never done    Bone Densitometry (Dexa) Screening  Never done    Pneumococcal 65+ years (1 of 1 - PPSV23) Never done    Medicare Yearly Exam  Never done    Flu Vaccine (1) Never done   . Coordination of Care:  1. Have you been to the ER, urgent care clinic since your last visit? Hospitalized since your last visit? No      2. Have you seen or consulted any other health care providers outside of the [address] [place] [address] since your last visit? Include any pap smears or colon screening.  Yes

## 2021-09-08 LAB
CENTROMERE B AB SER-ACNC: <0.2 AI (ref 0–0.9)
CHROMATIN AB SERPL-ACNC: <0.2 AI (ref 0–0.9)
DSDNA AB SER-ACNC: 1 IU/ML (ref 0–9)
ENA JO1 AB SER-ACNC: <0.2 AI (ref 0–0.9)
ENA RNP AB SER-ACNC: 0.4 AI (ref 0–0.9)
ENA SCL70 AB SER-ACNC: <0.2 AI (ref 0–0.9)
ENA SM AB SER-ACNC: <0.2 AI (ref 0–0.9)
ENA SM+RNP AB SER-ACNC: <0.2 AI (ref 0–0.9)
ENA SS-A AB SER-ACNC: <0.2 AI (ref 0–0.9)
ENA SS-B AB SER-ACNC: <0.2 AI (ref 0–0.9)
RIBOSOMAL P AB SER-ACNC: <0.2 AI (ref 0–0.9)
SEE BELOW:, 164879: NORMAL

## 2021-09-12 ENCOUNTER — TELEPHONE (OUTPATIENT)
Dept: FAMILY MEDICINE CLINIC | Age: 81
End: 2021-09-12

## 2021-09-12 NOTE — TELEPHONE ENCOUNTER
I called the patient and the CRP elevated sed rate slightly elevated rheumatoid factor also positive which she has been she has seen 3 different rheumatologist about it her metabolic panel and CBC were normal

## 2021-09-16 LAB
14.3.3 ETA, RHEUM. ARTHRITIS: <0.2 NG/ML
CCP IGA+IGG SERPL IA-ACNC: <20 UNITS
RHEUMATOID FACT SERPL-ACNC: 18 UNITS/ML

## 2021-10-13 ENCOUNTER — OFFICE VISIT (OUTPATIENT)
Dept: FAMILY MEDICINE CLINIC | Age: 81
End: 2021-10-13
Payer: MEDICARE

## 2021-10-13 VITALS
BODY MASS INDEX: 31.76 KG/M2 | DIASTOLIC BLOOD PRESSURE: 86 MMHG | TEMPERATURE: 98.2 F | WEIGHT: 185 LBS | SYSTOLIC BLOOD PRESSURE: 173 MMHG

## 2021-10-13 DIAGNOSIS — K21.9 GASTROESOPHAGEAL REFLUX DISEASE WITHOUT ESOPHAGITIS: ICD-10-CM

## 2021-10-13 DIAGNOSIS — E78.01 FAMILIAL HYPERCHOLESTEROLEMIA: ICD-10-CM

## 2021-10-13 DIAGNOSIS — I10 ESSENTIAL HYPERTENSION: ICD-10-CM

## 2021-10-13 DIAGNOSIS — I82.4Z1 ACUTE DEEP VEIN THROMBOSIS (DVT) OF DISTAL VEIN OF RIGHT LOWER EXTREMITY (HCC): ICD-10-CM

## 2021-10-13 DIAGNOSIS — J01.10 ACUTE FRONTAL SINUSITIS, RECURRENCE NOT SPECIFIED: Primary | ICD-10-CM

## 2021-10-13 PROCEDURE — G8753 SYS BP > OR = 140: HCPCS | Performed by: NURSE PRACTITIONER

## 2021-10-13 PROCEDURE — 1101F PT FALLS ASSESS-DOCD LE1/YR: CPT | Performed by: NURSE PRACTITIONER

## 2021-10-13 PROCEDURE — G8432 DEP SCR NOT DOC, RNG: HCPCS | Performed by: NURSE PRACTITIONER

## 2021-10-13 PROCEDURE — G8536 NO DOC ELDER MAL SCRN: HCPCS | Performed by: NURSE PRACTITIONER

## 2021-10-13 PROCEDURE — G8427 DOCREV CUR MEDS BY ELIG CLIN: HCPCS | Performed by: NURSE PRACTITIONER

## 2021-10-13 PROCEDURE — G8754 DIAS BP LESS 90: HCPCS | Performed by: NURSE PRACTITIONER

## 2021-10-13 PROCEDURE — 99214 OFFICE O/P EST MOD 30 MIN: CPT | Performed by: NURSE PRACTITIONER

## 2021-10-13 PROCEDURE — G8417 CALC BMI ABV UP PARAM F/U: HCPCS | Performed by: NURSE PRACTITIONER

## 2021-10-13 PROCEDURE — 1090F PRES/ABSN URINE INCON ASSESS: CPT | Performed by: NURSE PRACTITIONER

## 2021-10-13 PROCEDURE — G8400 PT W/DXA NO RESULTS DOC: HCPCS | Performed by: NURSE PRACTITIONER

## 2021-10-13 RX ORDER — AMOXICILLIN AND CLAVULANATE POTASSIUM 875; 125 MG/1; MG/1
1 TABLET, FILM COATED ORAL 2 TIMES DAILY
Qty: 20 TABLET | Refills: 0 | Status: SHIPPED | OUTPATIENT
Start: 2021-10-13 | End: 2021-10-23

## 2021-10-13 NOTE — PROGRESS NOTES
History of Present Illness  Hernan Pacheco is a 78 y.o. female who presents today for:    Chief Complaint   Patient presents with    Elevated Blood Pressure     blood pressure has been elevated since this past Sunday      Past Medical History  Past Medical History:   Diagnosis Date    Acute deep vein thrombosis (DVT) of distal vein of right lower extremity ([address] 6/16/2020    Baker's cyst of knee     Left    Easy bruising     Essential hypertension 6/16/2020    Hypercholesteremia     Hypertension     Left arm pain     Neck pain     Osteoarthritis of both knees     Rapid pulse     Right elbow pain     Right hand pain     Right lateral epicondylitis     Swelling of joint of left wrist     Thoracic kyphosis     Cervical muscle stiffness    Wears glasses         Surgical History  History reviewed. No pertinent surgical history. Current Medications  Current Outpatient Medications   Medication Sig    Eliquis 5 mg tablet     lisinopriL (PRINIVIL, ZESTRIL) 20 mg tablet Take 1 Tab by mouth daily.  esomeprazole (NexIUM) 20 mg capsule Take 1 Cap by mouth daily.  LORazepam (ATIVAN) 1 mg tablet Take 1 Tab by mouth daily as needed for Anxiety.  amLODIPine (NORVASC) 2.5 mg tablet TAKE 1 TABLET TWICE A DAY    bisoprolol (ZEBETA) 5 mg tablet TAKE 1 TABLET TWICE A DAY    furosemide (LASIX) 20 mg tablet As needed as needed    simvastatin (ZOCOR) 10 mg tablet TAKE 1 TABLET DAILY    apremilast (OTEZLA) 30 mg tab Take 30 mg by mouth daily.  turmeric root extract 500 mg cap Take 500 mg by mouth.  cyanocobalamin, vitamin B-12, (VITAMIN B-12 PO) Take 1,000 mcg by mouth.  cranberry fruit extract (CRANBERRY PO) Take  by mouth.  CALCIUM CARB/VIT D3/MINERALS (CALCIUM-VITAMIN D PO) Take  by mouth.  DOCOSAHEXANOIC ACID/EPA (FISH OIL PO) Take  by mouth.  promethazine (PHENERGAN) 25 mg tablet Take 1 Tablet by mouth every six (6) hours as needed for Nausea.  (Patient not taking: Reported on 9/7/2021)     No current facility-administered medications for this visit.        Allergies/Drug Reactions  Allergies   Allergen Reactions    Epinephrine Other (comments)     Fast heartbeat    Morphine Not Reported This Time    Novocain [Procaine] Other (comments)     Fast heartbeat    Shellfish Derived Diarrhea and Nausea and Vomiting        Family History  Family History   Problem Relation Age of Onset    Heart Disease Son         Social History  Social History     Tobacco Use    Smoking status: Never Smoker    Smokeless tobacco: Never Used   Substance Use Topics    Alcohol use: No    Drug use: Never        Health Maintenance   Topic Date Due    COVID-19 Vaccine (1) Never done    DTaP/Tdap/Td series (1 - Tdap) Never done    Shingrix Vaccine Age 50> (1 of 2) Never done    Bone Densitometry (Dexa) Screening  Never done    Pneumococcal 65+ years (1 of 1 - PPSV23) Never done    Medicare Yearly Exam  Never done    Flu Vaccine (1) Never done    Lipid Screen  01/05/2022     Review of Systems  ROS       Physical Exam  Vital signs:   Vitals:    10/13/21 1059   BP: (!) 173/86   Temp: 98.2 °F (36.8 °C)   Weight: 185 lb (83.9 kg)     General: alert, oriented, not in distress  Head: scalp normal, atraumatic  Eyes: pupils are equal and reactive, full and intact EOM's  Ears: patent ear canal, intact tympanic membrane  Nose: normal turbinates, no congestion or discharge  Lips/Mouth: moist lips and buccal mucosa, non-enlarged tonsils, pink throat  Neck: supple, no JVD, no lymphadenopathy, non-palpable thyroid  Chest/Lungs: clear breath sounds, no wheezing or crackles  Heart: normal rate, regular rhythm, no murmur  Abdomen: soft, non-distended, non-tender, normal bowel sounds, no organomegaly, no masses  Extremities: no focal deformities, no edema  Skin: no active skin lesions    Laboratory/Tests:  Hospital Outpatient Visit on 09/07/2021   Component Date Value Ref Range Status    WBC 09/07/2021 4.6  4.6 - 13.2 K/uL Final    RBC 09/07/2021 3.98* 4.20 - 5.30 M/uL Final    HGB 09/07/2021 12.7  12.0 - 16.0 g/dL Final    HCT 09/07/2021 39.8  35.0 - 45.0 % Final    MCV 09/07/2021 100.0  78.0 - 100.0 FL Final    PLEASE NOTE NEW REFERENCE RANGE    MCH 09/07/2021 31.9  24.0 - 34.0 PG Final    MCHC 09/07/2021 31.9  31.0 - 37.0 g/dL Final    RDW 09/07/2021 14.4  11.6 - 14.5 % Final    PLATELET 87/53/5745 489  135 - 420 K/uL Final    MPV 09/07/2021 12.8* 9.2 - 11.8 FL Final    NRBC 09/07/2021 0.0  0.0  WBC Final    ABSOLUTE NRBC 09/07/2021 0.00  0.00 - 0.01 K/uL Final    Sed rate (ESR) 09/07/2021 55  mm/hr Final    Anti-DNA (DS) Ab, QT 09/07/2021 1  0 - 9 IU/mL Final    Comment:                                   Negative      <5                                    Equivocal  5 - 9                                    Positive      >9      RNP Abs 09/07/2021 0.4  0.0 - 0.9 AI Final    Tillman Abs 09/07/2021 <0.2  0.0 - 0.9 AI Final    Tillman/RNP Abs 09/07/2021 <0.2  0.0 - 0.9 AI Final    Scleroderma-70 Ab 09/07/2021 <0.2  0.0 - 0.9 AI Final    Sjogren's Anti-SS-A 09/07/2021 <0.2  0.0 - 0.9 AI Final    Sjogren's Anti-SS-B 09/07/2021 <0.2  0.0 - 0.9 AI Final    Antichromatin Ab 09/07/2021 <0.2  0.0 - 0.9 AI Final    Anti Ribosomal P Ab 09/07/2021 <0.2  0.0 - 0.9 AI Final    Anti-Vioelta-1 09/07/2021 <0.2  0.0 - 0.9 AI Final    Centromere B Ab 09/07/2021 <0.2  0.0 - 0.9 AI Final    See below 09/07/2021 Comment    Final    Comment: Autoantibody                       Disease Association  ____________________________________________________________                         Condition                  Frequency  _____________________   ________________________   _________  Antinuclear Antibody,    SLE, mixed connective  Direct (JODI-D)           tissue diseases  _____________________   ________________________   _________  dsDNA                    SLE                        40 - 60%  _____________________ ________________________   _________  Chromatin                Drug induced SLE                90%                          SLE                        48 - 97%  _____________________   ________________________   _________  Susan Juan (Ro)                 SLE                        25 - 35%                          Sjogren's Syndrome         40 - 70%                           Lupus                 100%  _____________________   ________________________   _________  SSB (La)                 SLE                                                        10%                          Sjogren's Syndrome              30%  _____________________   _______________________    _________  Sm (anti-Smith)          SLE                        15 - 30%  _____________________   _______________________    _________  RNP                      Mixed Connective Tissue                          Disease                         95%  (U1 nRNP,                SLE                        30 - 50%  anti-ribonucleoprotein)  Polymyositis and/or                          Dermatomyositis                 20%  _____________________   ________________________   _________  Scl-70 (antiDNA          Scleroderma (diffuse)      20 - 35%  topoisomerase)           Crest                           13%  _____________________   ________________________   _________  Violeta-1                     Polymyositis and/or                          Dermatomyositis            20 - 40%  _____________________   ________________________   _________  Susies Anthony BIRCH             Scleroderma - Crest                                                     variant                         80%  _____________________   ________________________   _________  Ribosomal P              SLE                        10 - 20%  Performed At: [facility]  [place] [address] [id number]  Onesimo Turner MD [id number]:[id number]      C-Reactive protein 2021 1.40* 0 - 1.0 mg/dL Final    Vitamin D 25-Hydroxy 09/07/2021 41.1  30 - 100 ng/mL Final    Comment: (NOTE)  Deficiency               <20 ng/mL  Insufficiency          20-30 ng/mL  Sufficient             ng/mL  Possible toxicity       >100 ng/mL    The Method used is Siemens Advia Centaur currently standardized to a   Center of Disease Control and Prevention (CDC) certified reference   [facility]. Samples containing fluorescein dye can produce falsely   elevated values when tested with the ADVIA Centaur Vitamin D Assay. It is recommended that results in the toxic range, >100 ng/mL, be   retested 72 hours post fluorescein exposure.  Sodium 09/07/2021 138  135 - 145 mmol/L Final    Potassium 09/07/2021 4.6  3.2 - 5.1 mmol/L Final    Chloride 09/07/2021 100  94 - 111 mmol/L Final    CO2 09/07/2021 27  21 - 33 mmol/L Final    Anion gap 09/07/2021 11  mmol/L Final    Glucose 09/07/2021 83  70 - 110 mg/dL Final    BUN 09/07/2021 13  9 - 21 mg/dL Final    Creatinine 09/07/2021 1.00  0.70 - 1.20 mg/dL Final    BUN/Creatinine ratio 09/07/2021 13    Final    GFR est AA 09/07/2021 >60  ml/min/1.73m2 Final    GFR est non-AA 09/07/2021 53  ml/min/1.73m2 Final    Comment: Estimated GFR is calculated using the IDMS-traceable Modification of Diet in Renal Disease (MDRD) Study equation, reported for both  Americans (GFRAA) and non- Americans (GFRNA), and normalized to 1.73m2 body surface area. The physician must decide which value applies to the patient. The MDRD study equation should only be used in individuals age 25 or older. It has not been validated for the following: pregnant women, patients with serious comorbid conditions, or on certain medications, or persons with extremes of body size, muscle mass, or nutritional status.       Calcium 09/07/2021 10.3  8.5 - 10.5 mg/dL Final    Bilirubin, total 09/07/2021 0.7  0.2 - 1.0 mg/dL Final    AST (SGOT) 09/07/2021 18  14 - 74 U/L Final    ALT (SGPT) 09/07/2021 13  3 - 52 U/L Final    Alk. phosphatase 09/07/2021 78  38 - 126 U/L Final    Protein, total 09/07/2021 7.6  6.1 - 8.4 g/dL Final    Albumin 09/07/2021 4.1  3.5 - 4.7 g/dL Final    Globulin 09/07/2021 3.5  g/dL Final    A-G Ratio 09/07/2021 1.2    Final    14. 3.3 ETA, Rheum. Arthritis 09/07/2021 <0.20  ng/mL Final    Comment:   Reference Range:  < 0.2 ng/mL    COMMENTS:  - This RheumAssure panel is comprised of three tests: RA  Factor, CCP Antibodies, and 14-3-3 eta protein. - Used together, these three markers are able to diagnose  established RA with a sensitivity of 88-96% and early RA  with a sensitivity of 78-92%(1,2).    - RA factor (also called RF) is elevated in established RA  (sensitivity 72-85%)(3,2) and in early RA (sensitivity  44-63%)(4,1). Its specificity for RA is 80%(3). - CCP Antibodies have similar sensitivities for  established RA (66-79%) and early RA (59%)(3,2) but  higher specificity (90-96%)(3,5). - Serum 14-3-3 eta protein is elevated in both established  RA (sensitivity 77%) and early RA (sensitivity 59-64%)  (1,2). It may provide a 15-20% incremental benefit in  identifying early RA (4,1). - Therefore, elevation of one or more markers of  RheumAssure is consistent with a diagnosis of rheumatoid  arthritis. When all three markers are negative, an RA  diagnosis is less l                           ikely. References:  1. Kiesha BARROS et al.J Rheumatol 2015;42(9):7674-1710.  2. Kiesha BARROS et al. [facility] Rheumatol 1762;05(29):3-74. 3. Pop PORTILLO et al. [facility] Rheum Dis 2003;62:870-874.  4. Carrier N, et al. Arthritis Res Ther 2016;18:37.  5. Mg S et al. [facility] Rheum Dis 1692;80:224-230. This test was developed and its performance characteristics  determined by [facility]. It has not been cleared or approved  by the Food and Drug Administration.   Performed At: [address] [address] [de-identified]  Juliane Holly MD [id number]      CCP Antibodies IgG/IgA 09/07/2021 <20  Units Final    Comment: Reference Range:  < 20        Negative  20 - 39     Weak Positive  40 - 59     Moderate Positive  > or = 60   Strong Positive  Performed At: [address] [de-identified]  Alonzo Funk MD [id number]      Rheumatoid Arthritis Factor 09/07/2021 18.0* Units/mL Final    Comment: Reference Range:  <14.0          Negative  > or = 14.0    Positive  Performed At: [address] [de-identified]  Alonzo Funk MD [id number]:[id number]       Patient reports 3-day history of elevated blood pressures. She reports she was checking her blood pressures twice daily and observed her blood pressure was elevated as high as 659 systolic. She reports she does not take her prescribed antihypertensives as prescribed. During visit it was a little difficult to understand her regimen. I have asked the patient to continue her antihypertensive medication regimen as prescribed. When her blood pressure was elevated she felt pressure above each eye and nausea. She denies fever. She is followed by cardiologist.  I have asked the patient to check her blood pressures twice daily at home and report readings at next visit. DVT right leg I  2020 and she is currently prescribed Eliquis. Patient reports she does not have fever but she has experienced the sinus pressure above her eyes previously when she was diagnosed with sinusitis. I think her elevated blood pressure was due to inconsistent medication regimen along with developing sinusitis. Assessment/Plan:    1. Sinusitits. Prescribed Augmentin 875/125 mg tablet twice daily for 10 days for management of sinusitis. 2.  Essential hypertension. Continue Lisinopril 20 mg tablet daily and Bisoprolol 5 mg tablet twice daily has been of essential hypertension. 3.  GERD. Continue Esomeprazole 20 mg capsule daily for management of GERD.   4.  History of right leg DVT and paroxysmal atrial fibrillation. Continue Eliquis 5 mg tablet daily for management of history of right leg DVT and paroxysmal atrial fibrillation. 5.  Familial hypercholesterolemia. Continue Simvastatin 10 mg tablet daily for management of familial hypercholesterolemia. I have discussed the diagnosis with the patient and the intended plan as seen in the above orders. The patient has received an after-visit summary and questions were answered concerning future plans. I have discussed medication side effects and warnings with the patient as well. I have reviewed the plan of care with the patient, accepted their input and they are in agreement with the treatment goals.        Larisa Shafer, NP  October 13, 2021

## 2021-10-15 ENCOUNTER — TELEPHONE (OUTPATIENT)
Dept: FAMILY MEDICINE CLINIC | Age: 81
End: 2021-10-15

## 2021-10-15 NOTE — TELEPHONE ENCOUNTER
Patient called and stated one of the medications that was prescribed on 10/13 has given her severe diarrhea. She would like a call back from nurse or Adi Bernal.  [phone]

## 2021-10-22 ENCOUNTER — OFFICE VISIT (OUTPATIENT)
Dept: INTERNAL MEDICINE CLINIC | Age: 81
End: 2021-10-22
Payer: MEDICARE

## 2021-10-22 VITALS
TEMPERATURE: 96.9 F | OXYGEN SATURATION: 99 % | RESPIRATION RATE: 20 BRPM | HEART RATE: 69 BPM | DIASTOLIC BLOOD PRESSURE: 80 MMHG | WEIGHT: 186 LBS | BODY MASS INDEX: 31.76 KG/M2 | HEIGHT: 64 IN | SYSTOLIC BLOOD PRESSURE: 139 MMHG

## 2021-10-22 DIAGNOSIS — I82.91 CHRONIC DEEP VEIN THROMBOSIS (DVT) OF NON-EXTREMITY VEIN: ICD-10-CM

## 2021-10-22 DIAGNOSIS — I10 ESSENTIAL HYPERTENSION: Primary | ICD-10-CM

## 2021-10-22 DIAGNOSIS — R60.9 EDEMA, UNSPECIFIED TYPE: ICD-10-CM

## 2021-10-22 DIAGNOSIS — J32.0 MAXILLARY SINUSITIS, UNSPECIFIED CHRONICITY: ICD-10-CM

## 2021-10-22 PROCEDURE — G8752 SYS BP LESS 140: HCPCS | Performed by: INTERNAL MEDICINE

## 2021-10-22 PROCEDURE — G8510 SCR DEP NEG, NO PLAN REQD: HCPCS | Performed by: INTERNAL MEDICINE

## 2021-10-22 PROCEDURE — G8427 DOCREV CUR MEDS BY ELIG CLIN: HCPCS | Performed by: INTERNAL MEDICINE

## 2021-10-22 PROCEDURE — G8536 NO DOC ELDER MAL SCRN: HCPCS | Performed by: INTERNAL MEDICINE

## 2021-10-22 PROCEDURE — G8400 PT W/DXA NO RESULTS DOC: HCPCS | Performed by: INTERNAL MEDICINE

## 2021-10-22 PROCEDURE — 1090F PRES/ABSN URINE INCON ASSESS: CPT | Performed by: INTERNAL MEDICINE

## 2021-10-22 PROCEDURE — 1101F PT FALLS ASSESS-DOCD LE1/YR: CPT | Performed by: INTERNAL MEDICINE

## 2021-10-22 PROCEDURE — G8754 DIAS BP LESS 90: HCPCS | Performed by: INTERNAL MEDICINE

## 2021-10-22 PROCEDURE — G8417 CALC BMI ABV UP PARAM F/U: HCPCS | Performed by: INTERNAL MEDICINE

## 2021-10-22 PROCEDURE — 99214 OFFICE O/P EST MOD 30 MIN: CPT | Performed by: INTERNAL MEDICINE

## 2021-10-22 NOTE — PROGRESS NOTES
Lorena Mckenzie presents today for   Chief Complaint   Patient presents with   [facility].       Is someone accompanying this pt? no  Is the patient using any DME equipment during OV? no    Depression Screening:  3 most recent PHQ Screens 10/22/2021   Little interest or pleasure in doing things Not at all   Feeling down, depressed, irritable, or hopeless Not at all   Total Score PHQ 2 0   Trouble falling or staying asleep, or sleeping too much -   Feeling tired or having little energy -   Poor appetite, weight loss, or overeating -   Feeling bad about yourself - or that you are a failure or have let yourself or your family down -   Trouble concentrating on things such as school, work, reading, or watching TV -   Moving or speaking so slowly that other people could have noticed; or the opposite being so fidgety that others notice -   Thoughts of being better off dead, or hurting yourself in some way -   PHQ 9 Score -   How difficult have these problems made it for you to do your work, take care of your home and get along with others -       Learning Assessment:  No flowsheet data found. Fall Risk  Fall Risk Assessment, last 12 mths 10/22/2021   Able to walk? Yes   Fall in past 12 months? 0   Do you feel unsteady?  0   Are you worried about falling 0       ADL  ADL Assessment 10/22/2021   Feeding yourself No Help Needed   Getting from bed to chair No Help Needed   Getting dressed No Help Needed   Bathing or showering No Help Needed   Walk across the room (includes cane/walker) No Help Needed   Using the telphone No Help Needed   Taking your medications No Help Needed   Preparing meals No Help Needed   Managing money (expenses/bills) No Help Needed   Moderately strenuous housework (laundry) No Help Needed   Shopping for personal items (toiletries/medicines) No Help Needed   Shopping for groceries No Help Needed   Driving No Help Needed   Climbing a flight of stairs No Help Needed   Getting to places beyond walking distances No Help Needed       Health Maintenance reviewed and discussed and ordered per Provider. Health Maintenance Due   Topic Date Due    DTaP/Tdap/Td series (1 - Tdap) Never done    Shingrix Vaccine Age 50> (1 of 2) Never done    Bone Densitometry (Dexa) Screening  Never done    Pneumococcal 65+ years (1 of 1 - PPSV23) Never done    Medicare Yearly Exam  Never done    Flu Vaccine (1) Never done   . Coordination of Care:  1. \"Have you been to the ER, urgent care clinic since your last visit? Hospitalized since your last visit? \" No    2. \"Have you seen or consulted any other health care providers outside of the [address] [place] [address] since your last visit? \" No

## 2021-10-22 NOTE — PROGRESS NOTES
1. Essential hypertension  Pressures controlled here. Continue bisoprolol lisinopril and Norvasc    2. Edema, unspecified type  He has chronic lower extremity edema right greater than left. I suspect this is due to of vascular damage from her acute DVT from a long time ago. Continue Lasix as needed. She will need metabolic panels for follow-up    3. Maxillary sinusitis, unspecified chronicity  Continue Augmentin. I have asked her to start Imodium 2 tablets daily for her diarrhea. I evaluated her right ear and it is clear    4. Chronic deep vein thrombosis (DVT) of non-extremity vein  Continue Eliquis at 2.5 mg twice daily    Patient and I had a long conversation regarding her establishing care with me. I did inform her that I attended my resignation. I also gave her direct reference to Nova Dueñas who by the way she really liked. The patient has an appointment scheduled with Domonique Morris next month  10/13/21 Domonique Mcpherson's progress note reviewed  ekg from 9/13/19 personally interpreted by me. NSR, 1st degree avb, rate of 71  Labs below reviewed  Results for Zenobia Rivera (MRN [id number]) as of 10/22/2021 09:49   Ref.  Range 4/13/2021 00:00 9/7/2021 10:03 9/7/2021 10:05   WBC Latest Ref Range: 4.6 - 13.2 K/uL  4.6    NRBC Latest Ref Range: 0.0  WBC  0.0    RBC Latest Ref Range: 4.20 - 5.30 M/uL  3.98 (L)    HGB Latest Ref Range: 12.0 - 16.0 g/dL  12.7    HCT Latest Ref Range: 35.0 - 45.0 %  39.8    MCV Latest Ref Range: 78.0 - 100.0 FL  100.0    MCH Latest Ref Range: 24.0 - 34.0 PG  31.9    MCHC Latest Ref Range: 31.0 - 37.0 g/dL  31.9    RDW Latest Ref Range: 11.6 - 14.5 %  14.4    PLATELET Latest Ref Range: 135 - 420 K/uL  236    MPV Latest Ref Range: 9.2 - 11.8 FL  12.8 (H)    ABSOLUTE NRBC Latest Ref Range: 0.00 - 0.01 K/uL  0.00    Sed rate (ESR) Latest Units: mm/hr  55    Sodium Latest Ref Range: 135 - 145 mmol/L   138   Potassium Latest Ref Range: 3.2 - 5.1 mmol/L   4.6   Chloride Latest Ref Range: 94 - 111 mmol/L   100   CO2 Latest Ref Range: 21 - 33 mmol/L   27   Anion gap Latest Units: mmol/L   11   Glucose Latest Ref Range: 70 - 110 mg/dL   83   BUN Latest Ref Range: 9 - 21 mg/dL   13   Creatinine Latest Ref Range: 0.70 - 1.20 mg/dL   1.00   BUN/Creatinine ratio Latest Units:     13   Calcium Latest Ref Range: 8.5 - 10.5 mg/dL   10.3   GFR est non-AA Latest Units: ml/min/1.73m2   53   GFR est AA Latest Units: ml/min/1.73m2   >60   Bilirubin, total Latest Ref Range: 0.2 - 1.0 mg/dL   0.7   Protein, total Latest Ref Range: 6.1 - 8.4 g/dL   7.6   Albumin Latest Ref Range: 3.5 - 4.7 g/dL   4.1   Globulin Latest Units: g/dL   3.5   A-G Ratio Latest Units:     1.2   ALT Latest Ref Range: 3 - 52 U/L   13   AST Latest Ref Range: 14 - 74 U/L   18   Alk. phosphatase Latest Ref Range: 38 - 126 U/L   78   C-Reactive protein Latest Ref Range: 0 - 1.0 mg/dL   1.40 (H)   EF %, External Unknown 50% or >     C REACTIVE PROTEIN, QT Unknown   Rpt (A)   Anti-DNA (DS) Ab, QT Latest Ref Range: 0 - 9 IU/mL   1   Rheumatoid Arthritis Factor Latest Units: Units/mL   18.0 (H)   14.3.3 ETA, Rheum. Arthritis Latest Units: ng/mL   <0.20   RHEUMASSURE Unknown   Rpt (A)   Sjogren's Anti-SS-A Latest Ref Range: 0.0 - 0.9 AI   <0.2   Sjogren's Anti-SS-B Latest Ref Range: 0.0 - 0.9 AI   <0.2   Vitamin D 25-Hydroxy Latest Ref Range: 30 - 100 ng/mL   41.1   VITAMIN D, 25 HYDROXY Unknown   Rpt   NUCLEAR STRESS TEST Unknown Attch     JODI COMPREHENSIVE PLUS PANEL Unknown   Rpt     Chief Complaint   Patient presents with   [address]        HPI   This is a delightful 69-year-old female who presents as a new patient to me. She had just seen Ludmila Lilly a week ago. She reports she is doing well but still has some mild postnasal drip. She was diagnosed with acute maxillary sinusitis and put on Augmentin. Since then she has developed diarrhea although she took some Imodium this morning which is helped.   She denies any chest pain palpitation shortness of breath or any change in her lower extremity edema. She overall reports she feels good. She really misses her PCP and really sought out my  because of the recommendation. Patient Active Problem List   Diagnosis Code    Acute deep vein thrombosis (DVT) of distal vein of right lower extremity ([facility]) I82.4Z1    Varicose veins of both lower extremities I83.93    Paroxysmal atrial fibrillation ([facility]) I48.0    Hypovitaminosis D E55.9    Familial hypercholesterolemia E78.01    Rheumatoid arthritis ([facility] [address] M06.9    Acute venous embolism and thrombosis of deep vessels of distal end of right lower extremity ([facility]) I82.4Z1    Localized edema R60.0    Tachycardia R00.0    Chronic deep vein thrombosis (DVT) of proximal vein of right lower extremity ([facility]) I82.5Y1    Hypertension I10    Polymyalgia rheumatica ([facility]) M35.3    Gastroesophageal reflux disease without esophagitis K21.9    Anxiety F41.9    Dyspnea R06.00        Current Outpatient Medications on File Prior to Visit   Medication Sig Dispense Refill    amoxicillin-clavulanate (AUGMENTIN) 875-125 mg per tablet Take 1 Tablet by mouth two (2) times a day for 10 days. 20 Tablet 0    Eliquis 5 mg tablet       lisinopriL (PRINIVIL, ZESTRIL) 20 mg tablet Take 1 Tab by mouth daily. 90 Tab 3    esomeprazole (NexIUM) 20 mg capsule Take 1 Cap by mouth daily. 90 Cap 3    LORazepam (ATIVAN) 1 mg tablet Take 1 Tab by mouth daily as needed for Anxiety. 30 Tab 2    amLODIPine (NORVASC) 2.5 mg tablet TAKE 1 TABLET TWICE A  Tab 3    bisoprolol (ZEBETA) 5 mg tablet TAKE 1 TABLET TWICE A  Tab 3    furosemide (LASIX) 20 mg tablet As needed as needed 20 Tab 1    simvastatin (ZOCOR) 10 mg tablet TAKE 1 TABLET DAILY 90 Tab 3    apremilast (OTEZLA) 30 mg tab Take 30 mg by mouth daily.  turmeric root extract 500 mg cap Take 500 mg by mouth.  cyanocobalamin, vitamin B-12, (VITAMIN B-12 PO) Take 1,000 mcg by mouth.  cranberry fruit extract (CRANBERRY PO) Take  by mouth.  CALCIUM CARB/VIT D3/MINERALS (CALCIUM-VITAMIN D PO) Take  by mouth.  DOCOSAHEXANOIC ACID/EPA (FISH OIL PO) Take  by mouth. No current facility-administered medications on file prior to visit. ROS  - GEN: no weight gain/loss, no fevers or chills  - HEENT: no vision changes, no tinnitus, no sore throat  - CV: no cp, palpitations + edema  - RESP: no sob, cough  - ABD: no n/v/ mild diarrhea, no blood in stool  - : no dysuria or changes in freq. - SKIN: no rashes, ulcers        Visit Vitals  /80   Pulse 69   Temp 96.9 °F (36.1 °C)   Resp 20   Ht 5' 4\" (1.626 m)   Wt 186 lb (84.4 kg)   SpO2 99%   BMI 31.93 kg/m²           Physical Exam  Constitutional:       Appearance: Normal appearance. obeset. NAD and pleasant  HENT:      Head: Normocephalic. Nose: Nose normal.      Mouth/Throat:      Mouth: Mucous membranes are moist. Throat not inflammed right tm is clear  Eyes:      Extraocular Movements: Extraocular movements intact. Conjunctiva/sclera: Conjunctivae normal. Sclera anicteric       Cardiovascular:      Rate and Rhythm: Normal rate and regular rhythm. 2/6 sm lsb     Pulses: Normal pulses. Pulmonary:      Effort: No respiratory distress. Breath sounds: CTAB and No stridor. No rhonchi. Abdominal:      General: There is no distension.  NT, ND

## 2021-11-09 ENCOUNTER — OFFICE VISIT (OUTPATIENT)
Dept: FAMILY MEDICINE CLINIC | Age: 81
End: 2021-11-09
Payer: MEDICARE

## 2021-11-09 VITALS
OXYGEN SATURATION: 98 % | TEMPERATURE: 97.3 F | SYSTOLIC BLOOD PRESSURE: 132 MMHG | DIASTOLIC BLOOD PRESSURE: 78 MMHG | BODY MASS INDEX: 31.93 KG/M2 | HEART RATE: 68 BPM | WEIGHT: 186 LBS

## 2021-11-09 DIAGNOSIS — K21.9 GASTROESOPHAGEAL REFLUX DISEASE WITHOUT ESOPHAGITIS: ICD-10-CM

## 2021-11-09 DIAGNOSIS — I82.5Y1 CHRONIC DEEP VEIN THROMBOSIS (DVT) OF PROXIMAL VEIN OF RIGHT LOWER EXTREMITY (HCC): ICD-10-CM

## 2021-11-09 DIAGNOSIS — E78.01 FAMILIAL HYPERCHOLESTEROLEMIA: ICD-10-CM

## 2021-11-09 DIAGNOSIS — I10 ESSENTIAL HYPERTENSION: Primary | ICD-10-CM

## 2021-11-09 PROCEDURE — G8400 PT W/DXA NO RESULTS DOC: HCPCS | Performed by: NURSE PRACTITIONER

## 2021-11-09 PROCEDURE — G8752 SYS BP LESS 140: HCPCS | Performed by: NURSE PRACTITIONER

## 2021-11-09 PROCEDURE — G8754 DIAS BP LESS 90: HCPCS | Performed by: NURSE PRACTITIONER

## 2021-11-09 PROCEDURE — G8417 CALC BMI ABV UP PARAM F/U: HCPCS | Performed by: NURSE PRACTITIONER

## 2021-11-09 PROCEDURE — 99214 OFFICE O/P EST MOD 30 MIN: CPT | Performed by: NURSE PRACTITIONER

## 2021-11-09 PROCEDURE — 1090F PRES/ABSN URINE INCON ASSESS: CPT | Performed by: NURSE PRACTITIONER

## 2021-11-09 PROCEDURE — 1101F PT FALLS ASSESS-DOCD LE1/YR: CPT | Performed by: NURSE PRACTITIONER

## 2021-11-09 PROCEDURE — G8432 DEP SCR NOT DOC, RNG: HCPCS | Performed by: NURSE PRACTITIONER

## 2021-11-09 PROCEDURE — G8427 DOCREV CUR MEDS BY ELIG CLIN: HCPCS | Performed by: NURSE PRACTITIONER

## 2021-11-09 PROCEDURE — G8536 NO DOC ELDER MAL SCRN: HCPCS | Performed by: NURSE PRACTITIONER

## 2021-11-09 RX ORDER — LISINOPRIL 20 MG/1
10 TABLET ORAL DAILY
Qty: 90 TABLET | Refills: 3 | Status: SHIPPED | OUTPATIENT
Start: 2021-11-09

## 2021-11-09 NOTE — PROGRESS NOTES
Beth Dumont presents today for   Chief Complaint   Patient presents with    Follow-up     1 month follow up        Is someone accompanying this pt? no    Is the patient using any DME equipment during OV? no    Depression Screening:  3 most recent PHQ Screens 11/9/2021   Little interest or pleasure in doing things Not at all   Feeling down, depressed, irritable, or hopeless Not at all   Total Score PHQ 2 0   Trouble falling or staying asleep, or sleeping too much -   Feeling tired or having little energy -   Poor appetite, weight loss, or overeating -   Feeling bad about yourself - or that you are a failure or have let yourself or your family down -   Trouble concentrating on things such as school, work, reading, or watching TV -   Moving or speaking so slowly that other people could have noticed; or the opposite being so fidgety that others notice -   Thoughts of being better off dead, or hurting yourself in some way -   PHQ 9 Score -   How difficult have these problems made it for you to do your work, take care of your home and get along with others -       Learning Assessment:  No flowsheet data found. Fall Risk  Fall Risk Assessment, last 12 mths 11/9/2021   Able to walk? Yes   Fall in past 12 months? 0   Do you feel unsteady?  0   Are you worried about falling 0       ADL  ADL Assessment 10/22/2021   Feeding yourself No Help Needed   Getting from bed to chair No Help Needed   Getting dressed No Help Needed   Bathing or showering No Help Needed   Walk across the room (includes cane/walker) No Help Needed   Using the telphone No Help Needed   Taking your medications No Help Needed   Preparing meals No Help Needed   Managing money (expenses/bills) No Help Needed   Moderately strenuous housework (laundry) No Help Needed   Shopping for personal items (toiletries/medicines) No Help Needed   Shopping for groceries No Help Needed   Driving No Help Needed   Climbing a flight of stairs No Help Needed   Getting to places beyond walking distances No Help Needed       Travel Screening:    Travel Screening     Question   Response    In the last month, have you been in contact with someone who was confirmed or suspected to have Coronavirus / COVID-19? No / Unsure    Have you had a COVID-19 viral test in the last 14 days? No    Do you have any of the following new or worsening symptoms? None of these    Have you traveled internationally or domestically in the last month? No      Travel History   Travel since 10/09/21    No documented travel since 10/09/21         Health Maintenance reviewed and discussed and ordered per Provider. Health Maintenance Due   Topic Date Due    DTaP/Tdap/Td series (1 - Tdap) Never done    Shingrix Vaccine Age 50> (1 of 2) Never done    Bone Densitometry (Dexa) Screening  Never done    Pneumococcal 65+ years (1 of 1 - PPSV23) Never done    Medicare Yearly Exam  Never done    Flu Vaccine (1) Never done    COVID-19 Vaccine (3 - Booster for Moderna series) 09/02/2021   . Coordination of Care:  1. \"Have you been to the ER, urgent care clinic since your last visit? Hospitalized since your last visit? \" No    2. \"Have you seen or consulted any other health care providers outside of the [address] [place] [address] since your last visit? \" Yes Reason for visit: Dermatology     3. For patients aged 39-70: Has the patient had a colonoscopy? No     If the patient is female:    4. For patients aged 41-77: Has the patient had a mammogram within the past 2 years? No    5. For patients aged 21-65: Has the patient had a pap smear?  No

## 2021-11-09 NOTE — PROGRESS NOTES
History of Present Illness  Doug Lazo is a 78 y.o. female who presents today for:    Chief Complaint   Patient presents with    Follow-up     1 month follow up      Past Medical History  Past Medical History:   Diagnosis Date    Acute deep vein thrombosis (DVT) of distal vein of right lower extremity ([address] 6/16/2020    Baker's cyst of knee     Left    Easy bruising     Essential hypertension 6/16/2020    Hypercholesteremia     Hypertension     Left arm pain     Neck pain     Osteoarthritis of both knees     Rapid pulse     Right elbow pain     Right hand pain     Right lateral epicondylitis     Swelling of joint of left wrist     Thoracic kyphosis     Cervical muscle stiffness    Wears glasses         Surgical History  History reviewed. No pertinent surgical history. Current Medications  Current Outpatient Medications   Medication Sig    lisinopriL (PRINIVIL, ZESTRIL) 20 mg tablet Take 0.5 Tablets by mouth daily.  Eliquis 5 mg tablet     esomeprazole (NexIUM) 20 mg capsule Take 1 Cap by mouth daily.  LORazepam (ATIVAN) 1 mg tablet Take 1 Tab by mouth daily as needed for Anxiety.  amLODIPine (NORVASC) 2.5 mg tablet TAKE 1 TABLET TWICE A DAY    bisoprolol (ZEBETA) 5 mg tablet TAKE 1 TABLET TWICE A DAY    furosemide (LASIX) 20 mg tablet As needed as needed    simvastatin (ZOCOR) 10 mg tablet TAKE 1 TABLET DAILY    apremilast (OTEZLA) 30 mg tab Take 30 mg by mouth daily.  turmeric root extract 500 mg cap Take 500 mg by mouth.  cyanocobalamin, vitamin B-12, (VITAMIN B-12 PO) Take 1,000 mcg by mouth.  cranberry fruit extract (CRANBERRY PO) Take  by mouth.  CALCIUM CARB/VIT D3/MINERALS (CALCIUM-VITAMIN D PO) Take  by mouth.  DOCOSAHEXANOIC ACID/EPA (FISH OIL PO) Take  by mouth. No current facility-administered medications for this visit.        Allergies/Drug Reactions  Allergies   Allergen Reactions    Epinephrine Other (comments)     Fast heartbeat    Morphine Not Reported This Time    Novocain [Procaine] Other (comments)     Fast heartbeat    Shellfish Derived Diarrhea and Nausea and Vomiting        Family History  Family History   Problem Relation Age of Onset    Heart Disease Son         Social History  Social History     Tobacco Use    Smoking status: Never Smoker    Smokeless tobacco: Never Used   Substance Use Topics    Alcohol use: No    Drug use: Never        Health Maintenance   Topic Date Due    DTaP/Tdap/Td series (1 - Tdap) Never done    Shingrix Vaccine Age 50> (1 of 2) Never done    Bone Densitometry (Dexa) Screening  Never done    Pneumococcal 65+ years (1 of 1 - PPSV23) Never done    Medicare Yearly Exam  Never done    COVID-19 Vaccine (3 - Booster for Moderna series) 09/02/2021    Lipid Screen  01/05/2022    Flu Vaccine  Completed     Immunization History   Administered Date(s) Administered    COVID-19, Marline Lazo, Primary or Immunocompromised Series, MRNA, PF, 100mcg/0.5mL 02/02/2021, 03/02/2021    Influenza High Dose Vaccine PF 10/27/2021       Physical Exam  Vital signs:   Vitals:    11/09/21 0918 11/09/21 1012   BP: (!) 175/80 132/78   Pulse: 68    Temp: 97.3 °F (36.3 °C)    SpO2: 98%    Weight: 186 lb (84.4 kg)      General: alert, oriented, not in distress  Head: scalp normal, atraumatic  Eyes: pupils are equal and reactive, full and intact EOM's  Ears: patent ear canal, intact tympanic membrane  Nose: normal turbinates, no congestion or discharge  Lips/Mouth: moist lips and buccal mucosa, non-enlarged tonsils, pink throat  Neck: supple, no JVD, no lymphadenopathy, non-palpable thyroid  Chest/Lungs: clear breath sounds, no wheezing or crackles  Heart: normal rate, regular rhythm, no murmur  Abdomen: soft, non-distended, non-tender, normal bowel sounds, no organomegaly, no masses  Extremities: no focal deformities, no edema  Skin: no active skin lesions    Laboratory/Tests:  Hospital Outpatient Visit on 09/07/2021   Component Date Value Ref Range Status    WBC 09/07/2021 4.6  4.6 - 13.2 K/uL Final    RBC 09/07/2021 3.98* 4.20 - 5.30 M/uL Final    HGB 09/07/2021 12.7  12.0 - 16.0 g/dL Final    HCT 09/07/2021 39.8  35.0 - 45.0 % Final    MCV 09/07/2021 100.0  78.0 - 100.0 FL Final    PLEASE NOTE NEW REFERENCE RANGE    MCH 09/07/2021 31.9  24.0 - 34.0 PG Final    MCHC 09/07/2021 31.9  31.0 - 37.0 g/dL Final    RDW 09/07/2021 14.4  11.6 - 14.5 % Final    PLATELET 80/04/7803 988  135 - 420 K/uL Final    MPV 09/07/2021 12.8* 9.2 - 11.8 FL Final    NRBC 09/07/2021 0.0  0.0  WBC Final    ABSOLUTE NRBC 09/07/2021 0.00  0.00 - 0.01 K/uL Final    Sed rate (ESR) 09/07/2021 55  mm/hr Final    Anti-DNA (DS) Ab, QT 09/07/2021 1  0 - 9 IU/mL Final    Comment:                                   Negative      <5                                    Equivocal  5 - 9                                    Positive      >9      RNP Abs 09/07/2021 0.4  0.0 - 0.9 AI Final    Tillman Abs 09/07/2021 <0.2  0.0 - 0.9 AI Final    Tillman/RNP Abs 09/07/2021 <0.2  0.0 - 0.9 AI Final    Scleroderma-70 Ab 09/07/2021 <0.2  0.0 - 0.9 AI Final    Sjogren's Anti-SS-A 09/07/2021 <0.2  0.0 - 0.9 AI Final    Sjogren's Anti-SS-B 09/07/2021 <0.2  0.0 - 0.9 AI Final    Antichromatin Ab 09/07/2021 <0.2  0.0 - 0.9 AI Final    Anti Ribosomal P Ab 09/07/2021 <0.2  0.0 - 0.9 AI Final    Anti-Violeta-1 09/07/2021 <0.2  0.0 - 0.9 AI Final    Centromere B Ab 09/07/2021 <0.2  0.0 - 0.9 AI Final    See below 09/07/2021 Comment    Final    Comment: Autoantibody                       Disease Association  ____________________________________________________________                         Condition                  Frequency  _____________________   ________________________   _________  Antinuclear Antibody,    SLE, mixed connective  Direct (JODI-D)           tissue diseases  _____________________   ________________________   _________  dsDNA                    SLE 40 - 60%  _____________________   ________________________   _________  Chromatin                Drug induced SLE                90%                          SLE                        48 - 97%  _____________________   ________________________   _________  Waller Babin (Ro)                 SLE                        25 - 35%                          Sjogren's Syndrome         40 - 70%                           Lupus                 100%  _____________________   ________________________   _________  SSB (La)                 SLE                                                        10%                          Sjogren's Syndrome              30%  _____________________   _______________________    _________  Sm (anti-Smith)          SLE                        15 - 30%  _____________________   _______________________    _________  RNP                      Mixed Connective Tissue                          Disease                         95%  (U1 nRNP,                SLE                        30 - 50%  anti-ribonucleoprotein)  Polymyositis and/or                          Dermatomyositis                 20%  _____________________   ________________________   _________  Scl-70 (antiDNA          Scleroderma (diffuse)      20 - 35%  topoisomerase)           Crest                           13%  _____________________   ________________________   _________  Violeta-1                     Polymyositis and/or                          Dermatomyositis            20 - 40%  _____________________   ________________________   _________  Marda Peterer B             Scleroderma - Crest                                                     variant                         80%  _____________________   ________________________   _________  Ribosomal P              SLE                        10 - 20%  Performed At: [facility]  [place] [address] [place] [address] [id number]  Emanuel James MD [id number]      C-Reactive protein 2021 1.40* 0 - 1.0 mg/dL Final    Vitamin D 25-Hydroxy 09/07/2021 41.1  30 - 100 ng/mL Final    Comment: (NOTE)  Deficiency               <20 ng/mL  Insufficiency          20-30 ng/mL  Sufficient             ng/mL  Possible toxicity       >100 ng/mL    The Method used is Siemens Advia Centaur currently standardized to a   Center of Disease Control and Prevention (CDC) certified reference   [facility]. Samples containing fluorescein dye can produce falsely   elevated values when tested with the ADVIA Centaur Vitamin D Assay. It is recommended that results in the toxic range, >100 ng/mL, be   retested 72 hours post fluorescein exposure.  Sodium 09/07/2021 138  135 - 145 mmol/L Final    Potassium 09/07/2021 4.6  3.2 - 5.1 mmol/L Final    Chloride 09/07/2021 100  94 - 111 mmol/L Final    CO2 09/07/2021 27  21 - 33 mmol/L Final    Anion gap 09/07/2021 11  mmol/L Final    Glucose 09/07/2021 83  70 - 110 mg/dL Final    BUN 09/07/2021 13  9 - 21 mg/dL Final    Creatinine 09/07/2021 1.00  0.70 - 1.20 mg/dL Final    BUN/Creatinine ratio 09/07/2021 13    Final    GFR est AA 09/07/2021 >60  ml/min/1.73m2 Final    GFR est non-AA 09/07/2021 53  ml/min/1.73m2 Final    Comment: Estimated GFR is calculated using the IDMS-traceable Modification of Diet in Renal Disease (MDRD) Study equation, reported for both  Americans (GFRAA) and non- Americans (GFRNA), and normalized to 1.73m2 body surface area. The physician must decide which value applies to the patient. The MDRD study equation should only be used in individuals age 25 or older. It has not been validated for the following: pregnant women, patients with serious comorbid conditions, or on certain medications, or persons with extremes of body size, muscle mass, or nutritional status.       Calcium 09/07/2021 10.3  8.5 - 10.5 mg/dL Final    Bilirubin, total 09/07/2021 0.7  0.2 - 1.0 mg/dL Final    AST (SGOT) 09/07/2021 18  14 - 74 U/L Final    ALT (SGPT) 09/07/2021 13  3 - 52 U/L Final    Alk. phosphatase 09/07/2021 78  38 - 126 U/L Final    Protein, total 09/07/2021 7.6  6.1 - 8.4 g/dL Final    Albumin 09/07/2021 4.1  3.5 - 4.7 g/dL Final    Globulin 09/07/2021 3.5  g/dL Final    A-G Ratio 09/07/2021 1.2    Final    14. 3.3 ETA, Rheum. Arthritis 09/07/2021 <0.20  ng/mL Final    Comment:   Reference Range:  < 0.2 ng/mL    COMMENTS:  - This RheumAssure panel is comprised of three tests: RA  Factor, CCP Antibodies, and 14-3-3 eta protein. - Used together, these three markers are able to diagnose  established RA with a sensitivity of 88-96% and early RA  with a sensitivity of 78-92%(1,2).    - RA factor (also called RF) is elevated in established RA  (sensitivity 72-85%)(3,2) and in early RA (sensitivity  44-63%)(4,1). Its specificity for RA is 80%(3). - CCP Antibodies have similar sensitivities for  established RA (66-79%) and early RA (59%)(3,2) but  higher specificity (90-96%)(3,5). - Serum 14-3-3 eta protein is elevated in both established  RA (sensitivity 77%) and early RA (sensitivity 59-64%)  (1,2). It may provide a 15-20% incremental benefit in  identifying early RA (4,1). - Therefore, elevation of one or more markers of  RheumAssure is consistent with a diagnosis of rheumatoid  arthritis. When all three markers are negative, an RA  diagnosis is less l                           ikely. References:  1. Farhatmoizabela WP et al.J Rheumatol 2015;42(9):7921-0580.  2. Kiesha BARROS et al. Tamara Cadet Rheumatol 5560;64(32):9-60. 3. Pop PORTILLO et al. [facility] [place] Rheum Dis 2003;62:870-874.  4. Carrier N, et al. Arthritis Res Ther 2016;18:37.  5. Mg S et al. [facility] [place] Rheum Dis 4635;90:707-004. This test was developed and its performance characteristics  determined by [facility]. It has not been cleared or approved  by the Food and Drug Administration.   Performed At: [address] [address] [de-identified]  Juliane Holly MD [id number]      CCP Antibodies IgG/IgA 09/07/2021 <20  Units Final    Comment: Reference Range:  < 20        Negative  20 - 39     Weak Positive  40 - 59     Moderate Positive  > or = 60   Strong Positive  Performed At: [address] [de-identified]  Jj Roca MD [id number]      Rheumatoid Arthritis Factor 09/07/2021 18.0* Units/mL Final    Comment: Reference Range:  <14.0          Negative  > or = 14.0    Positive  Performed At: [address] [de-identified]  Jj Roca MD [id number]       Left ear cerumen from audiologist report. Patient reports elevated blood pressure 2 weeks ago while visiting wit daughter in [place]. Patient reports her blood pressures were back to normal range after returning home. Patient reports after routine visit with dermatologist and was advised to switch Enbrel. for psoriasis. She will follow up with dermatologist soon to decide whether to continue current psoriasis treatment or switch to Enbrel. Patient reports diarrhea after prescribed Augmentin. Reports she used probiotic and completed Augmentin regimen. Diarrhea has since resolved. Physical examination performed:  Bilateral ear tympanic membrane visualized during otoscopic examination revealed no abnormalities. Cardiac auscultation revealed no arrhythmias or murmurs. Bilateral lung sounds clear to auscultation in all fields. Abdomen soft and nontender, bowel sounds normoactive in all 4 quadrants. There is no observed bilateral lower extremity edema. Assessment/Plan:    1. Essential hypertension. Continue Amlodipine 2.5 mg tablet daily, Bisoprolol 5 mg tablet twice daily and Lisinopril 20 mg tablet, take 1/2 tablet daily regimen of essential hypertension. 2.  Familial hypercholesterolemia. Continue Simvastatin 10 mg tablet daily for management of familial hypercholesterolemia. 3.  Chronic DVT.   Continue Eliquis 5 mg tablet twice daily for management of chronic DVT. 4.  GERD. Continue Esomeprazole 20 mg capsule daily for management of GERD. Follow-up and Dispositions    · Return in about 3 months (around 2/9/2022). I have discussed the diagnosis with the patient and the intended plan as seen in the above orders. The patient has received an after-visit summary and questions were answered concerning future plans. I have discussed medication side effects and warnings with the patient as well. I have reviewed the plan of care with the patient, accepted their input and they are in agreement with the treatment goals.        Sheldon Nicolas NP  November 9, 2021

## 2021-12-20 DIAGNOSIS — Z12.31 ENCOUNTER FOR SCREENING MAMMOGRAM FOR MALIGNANT NEOPLASM OF BREAST: ICD-10-CM

## 2022-02-09 ENCOUNTER — OFFICE VISIT (OUTPATIENT)
Dept: FAMILY MEDICINE CLINIC | Age: 82
End: 2022-02-09
Payer: MEDICARE

## 2022-02-09 VITALS
SYSTOLIC BLOOD PRESSURE: 136 MMHG | WEIGHT: 185 LBS | DIASTOLIC BLOOD PRESSURE: 68 MMHG | OXYGEN SATURATION: 100 % | HEART RATE: 67 BPM | BODY MASS INDEX: 31.76 KG/M2

## 2022-02-09 DIAGNOSIS — E55.9 HYPOVITAMINOSIS D: ICD-10-CM

## 2022-02-09 DIAGNOSIS — E78.01 FAMILIAL HYPERCHOLESTEROLEMIA: ICD-10-CM

## 2022-02-09 DIAGNOSIS — K21.9 GASTROESOPHAGEAL REFLUX DISEASE WITHOUT ESOPHAGITIS: ICD-10-CM

## 2022-02-09 DIAGNOSIS — I10 ESSENTIAL HYPERTENSION: Primary | ICD-10-CM

## 2022-02-09 DIAGNOSIS — I82.5Y1 CHRONIC DEEP VEIN THROMBOSIS (DVT) OF PROXIMAL VEIN OF RIGHT LOWER EXTREMITY (HCC): ICD-10-CM

## 2022-02-09 PROCEDURE — 99214 OFFICE O/P EST MOD 30 MIN: CPT | Performed by: NURSE PRACTITIONER

## 2022-02-09 PROCEDURE — G8754 DIAS BP LESS 90: HCPCS | Performed by: NURSE PRACTITIONER

## 2022-02-09 PROCEDURE — G8752 SYS BP LESS 140: HCPCS | Performed by: NURSE PRACTITIONER

## 2022-02-09 PROCEDURE — 1101F PT FALLS ASSESS-DOCD LE1/YR: CPT | Performed by: NURSE PRACTITIONER

## 2022-02-09 PROCEDURE — G8427 DOCREV CUR MEDS BY ELIG CLIN: HCPCS | Performed by: NURSE PRACTITIONER

## 2022-02-09 PROCEDURE — G8400 PT W/DXA NO RESULTS DOC: HCPCS | Performed by: NURSE PRACTITIONER

## 2022-02-09 PROCEDURE — G8417 CALC BMI ABV UP PARAM F/U: HCPCS | Performed by: NURSE PRACTITIONER

## 2022-02-09 PROCEDURE — 1090F PRES/ABSN URINE INCON ASSESS: CPT | Performed by: NURSE PRACTITIONER

## 2022-02-09 PROCEDURE — G8432 DEP SCR NOT DOC, RNG: HCPCS | Performed by: NURSE PRACTITIONER

## 2022-02-09 PROCEDURE — G8536 NO DOC ELDER MAL SCRN: HCPCS | Performed by: NURSE PRACTITIONER

## 2022-02-09 RX ORDER — FUROSEMIDE 20 MG/1
TABLET ORAL
Qty: 20 TABLET | Refills: 0 | Status: SHIPPED | OUTPATIENT
Start: 2022-02-09 | End: 2022-02-09

## 2022-02-09 RX ORDER — FUROSEMIDE 20 MG/1
TABLET ORAL
Qty: 20 TABLET | Refills: 0 | Status: SHIPPED | OUTPATIENT
Start: 2022-02-09 | End: 2022-02-10 | Stop reason: SDUPTHER

## 2022-02-09 NOTE — PROGRESS NOTES
History of Present Illness  hTomas Saldaña is a 78 y.o. female who presents today for:    Past Medical History  Past Medical History:   Diagnosis Date    Acute deep vein thrombosis (DVT) of distal vein of right lower extremity ([address] 6/16/2020    Baker's cyst of knee     Left    Easy bruising     Essential hypertension 6/16/2020    Hypercholesteremia     Hypertension     Left arm pain     Neck pain     Osteoarthritis of both knees     Rapid pulse     Right elbow pain     Right hand pain     Right lateral epicondylitis     Swelling of joint of left wrist     Thoracic kyphosis     Cervical muscle stiffness    Wears glasses         Surgical History  History reviewed. No pertinent surgical history. Current Medications  Current Outpatient Medications   Medication Sig    furosemide (LASIX) 20 mg tablet As needed as needed    lisinopriL (PRINIVIL, ZESTRIL) 20 mg tablet Take 0.5 Tablets by mouth daily.  Eliquis 5 mg tablet Take 5 mg by mouth two (2) times a day.  esomeprazole (NexIUM) 20 mg capsule Take 1 Cap by mouth daily.  LORazepam (ATIVAN) 1 mg tablet Take 1 Tab by mouth daily as needed for Anxiety.  amLODIPine (NORVASC) 2.5 mg tablet TAKE 1 TABLET TWICE A DAY    bisoprolol (ZEBETA) 5 mg tablet TAKE 1 TABLET TWICE A DAY    simvastatin (ZOCOR) 10 mg tablet TAKE 1 TABLET DAILY    apremilast (OTEZLA) 30 mg tab Take 30 mg by mouth daily.  turmeric root extract 500 mg cap Take 500 mg by mouth.  cyanocobalamin, vitamin B-12, (VITAMIN B-12 PO) Take 1,000 mcg by mouth.  cranberry fruit extract (CRANBERRY PO) Take  by mouth.  CALCIUM CARB/VIT D3/MINERALS (CALCIUM-VITAMIN D PO) Take  by mouth.  DOCOSAHEXANOIC ACID/EPA (FISH OIL PO) Take  by mouth. No current facility-administered medications for this visit.        Allergies/Drug Reactions  Allergies   Allergen Reactions    Epinephrine Other (comments) and Palpitations     Fast heartbeat    Morphine Not Reported This Time  Procaine Other (comments) and Hives     Fast heartbeat    Shellfish Derived Diarrhea and Nausea and Vomiting        Family History  Family History   Problem Relation Age of Onset    Heart Disease Son         Social History  Social History     Tobacco Use    Smoking status: Never Smoker    Smokeless tobacco: Never Used   Substance Use Topics    Alcohol use: No    Drug use: Never        Health Maintenance   Topic Date Due    DTaP/Tdap/Td series (1 - Tdap) Never done    Shingrix Vaccine Age 50> (1 of 2) Never done    Bone Densitometry (Dexa) Screening  Never done    Pneumococcal 65+ years (1 of 1 - PPSV23) Never done    Medicare Yearly Exam  Never done    Lipid Screen  01/05/2022    Depression Screen  11/09/2022    Flu Vaccine  Completed    COVID-19 Vaccine  Completed     Immunization History   Administered Date(s) Administered    COVID-19, Viral Mcintyre, Primary or Immunocompromised Series, MRNA, PF, 100mcg/0.5mL 02/02/2021, 03/02/2021, 11/13/2021    Influenza High Dose Vaccine PF 10/27/2021     Physical Exam  Vital signs:   Vitals:    02/09/22 0831   BP: 136/68   Pulse: 67   SpO2: 100%   Weight: 185 lb (83.9 kg)     General: alert, oriented, not in distress  Head: scalp normal, atraumatic  Eyes: pupils are equal and reactive, full and intact EOM's  Ears: patent ear canal, intact tympanic membrane  Nose: normal turbinates, no congestion or discharge  Lips/Mouth: moist lips and buccal mucosa, non-enlarged tonsils, pink throat  Neck: supple, no JVD, no lymphadenopathy, non-palpable thyroid  Chest/Lungs: clear breath sounds, no wheezing or crackles  Heart: normal rate, regular rhythm, no murmur  Abdomen: soft, non-distended, non-tender, normal bowel sounds, no organomegaly, no masses  Extremities: no focal deformities, no edema  Skin: no active skin lesions    Laboratory/Tests:  No visits with results within 3 Month(s) from this visit.    Latest known visit with results is:   Hospital Outpatient Visit on 09/07/2021   Component Date Value Ref Range Status    WBC 09/07/2021 4.6  4.6 - 13.2 K/uL Final    RBC 09/07/2021 3.98* 4.20 - 5.30 M/uL Final    HGB 09/07/2021 12.7  12.0 - 16.0 g/dL Final    HCT 09/07/2021 39.8  35.0 - 45.0 % Final    MCV 09/07/2021 100.0  78.0 - 100.0 FL Final    PLEASE NOTE NEW REFERENCE RANGE    MCH 09/07/2021 31.9  24.0 - 34.0 PG Final    MCHC 09/07/2021 31.9  31.0 - 37.0 g/dL Final    RDW 09/07/2021 14.4  11.6 - 14.5 % Final    PLATELET 53/97/1736 053  135 - 420 K/uL Final    MPV 09/07/2021 12.8* 9.2 - 11.8 FL Final    NRBC 09/07/2021 0.0  0.0  WBC Final    ABSOLUTE NRBC 09/07/2021 0.00  0.00 - 0.01 K/uL Final    Sed rate (ESR) 09/07/2021 55  mm/hr Final    Anti-DNA (DS) Ab, QT 09/07/2021 1  0 - 9 IU/mL Final    Comment:                                   Negative      <5                                    Equivocal  5 - 9                                    Positive      >9      RNP Abs 09/07/2021 0.4  0.0 - 0.9 AI Final    Tillman Abs 09/07/2021 <0.2  0.0 - 0.9 AI Final    Tillman/RNP Abs 09/07/2021 <0.2  0.0 - 0.9 AI Final    Scleroderma-70 Ab 09/07/2021 <0.2  0.0 - 0.9 AI Final    Sjogren's Anti-SS-A 09/07/2021 <0.2  0.0 - 0.9 AI Final    Sjogren's Anti-SS-B 09/07/2021 <0.2  0.0 - 0.9 AI Final    Antichromatin Ab 09/07/2021 <0.2  0.0 - 0.9 AI Final    Anti Ribosomal P Ab 09/07/2021 <0.2  0.0 - 0.9 AI Final    Anti-Violeta-1 09/07/2021 <0.2  0.0 - 0.9 AI Final    Centromere B Ab 09/07/2021 <0.2  0.0 - 0.9 AI Final    See below 09/07/2021 Comment    Final    Comment: Autoantibody                       Disease Association  ____________________________________________________________                         Condition                  Frequency  _____________________   ________________________   _________  Antinuclear Antibody,    SLE, mixed connective  Direct (JODI-D)           tissue diseases  _____________________   ________________________   _________  dsDNA SLE                        40 - 60%  _____________________   ________________________   _________  Chromatin                Drug induced SLE                90%                          SLE                        48 - 97%  _____________________   ________________________   _________  Eileen Grad (Ro)                 SLE                        25 - 35%                          Sjogren's Syndrome         40 - 70%                           Lupus                 100%  _____________________   ________________________   _________  SSB (La)                 SLE                                                        10%                          Sjogren's Syndrome              30%  _____________________   _______________________    _________  Sm (anti-Smith)          SLE                        15 - 30%  _____________________   _______________________    _________  RNP                      Mixed Connective Tissue                          Disease                         95%  (U1 nRNP,                SLE                        30 - 50%  anti-ribonucleoprotein)  Polymyositis and/or                          Dermatomyositis                 20%  _____________________   ________________________   _________  Scl-70 (antiDNA          Scleroderma (diffuse)      20 - 35%  topoisomerase)           Crest                           13%  _____________________   ________________________   _________  Violeta-1                     Polymyositis and/or                          Dermatomyositis            20 - 40%  _____________________   ________________________   _________  Areta Poplin B             Scleroderma - Crest                                                     variant                         80%  _____________________   ________________________   _________  Ribosomal P              SLE                        10 - 20%  Performed At: [facility]  [address] [place] [id number]  Polo Carpio MD [id number]      C-Reactive protein 09/07/2021 1.40* 0 - 1.0 mg/dL Final    Vitamin D 25-Hydroxy 09/07/2021 41.1  30 - 100 ng/mL Final    Comment: (NOTE)  Deficiency               <20 ng/mL  Insufficiency          20-30 ng/mL  Sufficient             ng/mL  Possible toxicity       >100 ng/mL    The Method used is Siemens Advia Centaur currently standardized to a   Center of Disease Control and Prevention (CDC) certified reference   [facility]. Samples containing fluorescein dye can produce falsely   elevated values when tested with the ADVIA Centaur Vitamin D Assay. It is recommended that results in the toxic range, >100 ng/mL, be   retested 72 hours post fluorescein exposure.  Sodium 09/07/2021 138  135 - 145 mmol/L Final    Potassium 09/07/2021 4.6  3.2 - 5.1 mmol/L Final    Chloride 09/07/2021 100  94 - 111 mmol/L Final    CO2 09/07/2021 27  21 - 33 mmol/L Final    Anion gap 09/07/2021 11  mmol/L Final    Glucose 09/07/2021 83  70 - 110 mg/dL Final    BUN 09/07/2021 13  9 - 21 mg/dL Final    Creatinine 09/07/2021 1.00  0.70 - 1.20 mg/dL Final    BUN/Creatinine ratio 09/07/2021 13    Final    GFR est AA 09/07/2021 >60  ml/min/1.73m2 Final    GFR est non-AA 09/07/2021 53  ml/min/1.73m2 Final    Comment: Estimated GFR is calculated using the IDMS-traceable Modification of Diet in Renal Disease (MDRD) Study equation, reported for both  Americans (GFRAA) and non- Americans (GFRNA), and normalized to 1.73m2 body surface area. The physician must decide which value applies to the patient. The MDRD study equation should only be used in individuals age 25 or older. It has not been validated for the following: pregnant women, patients with serious comorbid conditions, or on certain medications, or persons with extremes of body size, muscle mass, or nutritional status.       Calcium 09/07/2021 10.3  8.5 - 10.5 mg/dL Final    Bilirubin, total 09/07/2021 0.7  0.2 - 1.0 mg/dL Final    AST (SGOT) 09/07/2021 18 14 - 74 U/L Final    ALT (SGPT) 09/07/2021 13  3 - 52 U/L Final    Alk. phosphatase 09/07/2021 78  38 - 126 U/L Final    Protein, total 09/07/2021 7.6  6.1 - 8.4 g/dL Final    Albumin 09/07/2021 4.1  3.5 - 4.7 g/dL Final    Globulin 09/07/2021 3.5  g/dL Final    A-G Ratio 09/07/2021 1.2    Final    14. 3.3 ETA, Rheum. Arthritis 09/07/2021 <0.20  ng/mL Final    Comment:   Reference Range:  < 0.2 ng/mL    COMMENTS:  - This RheumAssure panel is comprised of three tests: RA  Factor, CCP Antibodies, and 14-3-3 eta protein. - Used together, these three markers are able to diagnose  established RA with a sensitivity of 88-96% and early RA  with a sensitivity of 78-92%(1,2).    - RA factor (also called RF) is elevated in established RA  (sensitivity 72-85%)(3,2) and in early RA (sensitivity  44-63%)(4,1). Its specificity for RA is 80%(3). - CCP Antibodies have similar sensitivities for  established RA (66-79%) and early RA (59%)(3,2) but  higher specificity (90-96%)(3,5). - Serum 14-3-3 eta protein is elevated in both established  RA (sensitivity 77%) and early RA (sensitivity 59-64%)  (1,2). It may provide a 15-20% incremental benefit in  identifying early RA (4,1). - Therefore, elevation of one or more markers of  RheumAssure is consistent with a diagnosis of rheumatoid  arthritis. When all three markers are negative, an RA  diagnosis is less l                           ikely. References:  1. Kiesha BARROS et al.J Rheumatol 2015;42(9):3820-3213.  2. Kiesha BARROS et al. [facility] Rheumatol 7745;69(48):3-49. 3. David PORTILLO et al. Rylie Height Rheum Dis 2003;62:870-874.  4. Ulises N, et al. Arthritis Res Ther 2016;18:37.  5. Mg S et al. Rylie Height Rheum Dis 8466;12:738-650. This test was developed and its performance characteristics  determined by [employer]. It has not been cleared or approved  by the Food and Drug Administration.   Performed At: [address] [address] [id number]  Kimberly Torres MD [id number]      CCP Antibodies IgG/IgA 09/07/2021 <20  Units Final    Comment: Reference Range:  < 20        Negative  20 - 39     Weak Positive  40 - 59     Moderate Positive  > or = 60   Strong Positive  Performed At: [address] [de-identified]  Kimberly Torres MD [id number]:[id number]      Rheumatoid Arthritis Factor 09/07/2021 18.0* Units/mL Final    Comment: Reference Range:  <14.0          Negative  > or = 14.0    Positive  Performed At: [address] [de-identified]  Kimberly Torres MD [id number]       Patient reports she was seen by pulmonologist, Dr. Deidra Rubio, for some chest discomfort. Patient was advised to walk 30 minutes per day by Dr. Deidra Rubio to increase her lung volume and capacity. Patient reports she was seen by dermatologist, Dr. Freedom Mcmillan, and the Enbrel did not help her psoriasis. She will see Dr. Freedom Mcmillan today after this visit. Assessment/Plan:    1. Essential hypertension. Continue Lisinopril 20 mg, take 1/2 tablet daily, Bisoprolol 5 mg BID and Amlodipine 2.5 mg tablet BID for management of essential hypertension. 2. Chronic DVT. Continue Eliquis 5 mg tablet BID for management of chronic DVT. 3. GERD. Continue Esomeprazole 20 mg daily for management of GERD. 4.  Familial hypercholesterolemia. Continue Simvastatin 10 mg tablet daily for management of familial hypercholesterolemia. Follow-up and Dispositions    · Return in about 6 months (around 8/9/2022). I have discussed the diagnosis with the patient and the intended plan as seen in the above orders. The patient has received an after-visit summary and questions were answered concerning future plans. I have discussed medication side effects and warnings with the patient as well. I have reviewed the plan of care with the patient, accepted their input and they are in agreement with the treatment goals.        Lucita Juan Loaiza NP  February 9, 2022

## 2022-02-09 NOTE — PROGRESS NOTES
Heath Carey presents today for   Chief Complaint   Patient presents with    Follow-up     3 month follow up        Is someone accompanying this pt? no    Is the patient using any DME equipment during OV? no    Depression Screening:  3 most recent PHQ Screens 2/9/2022   Little interest or pleasure in doing things Not at all   Feeling down, depressed, irritable, or hopeless Not at all   Total Score PHQ 2 0   Trouble falling or staying asleep, or sleeping too much -   Feeling tired or having little energy -   Poor appetite, weight loss, or overeating -   Feeling bad about yourself - or that you are a failure or have let yourself or your family down -   Trouble concentrating on things such as school, work, reading, or watching TV -   Moving or speaking so slowly that other people could have noticed; or the opposite being so fidgety that others notice -   Thoughts of being better off dead, or hurting yourself in some way -   PHQ 9 Score -   How difficult have these problems made it for you to do your work, take care of your home and get along with others -       Learning Assessment:  No flowsheet data found. Fall Risk  Fall Risk Assessment, last 12 mths 2/9/2022   Able to walk? Yes   Fall in past 12 months? 0   Do you feel unsteady?  0   Are you worried about falling 0       ADL  ADL Assessment 10/22/2021   Feeding yourself No Help Needed   Getting from bed to chair No Help Needed   Getting dressed No Help Needed   Bathing or showering No Help Needed   Walk across the room (includes cane/walker) No Help Needed   Using the telphone No Help Needed   Taking your medications No Help Needed   Preparing meals No Help Needed   Managing money (expenses/bills) No Help Needed   Moderately strenuous housework (laundry) No Help Needed   Shopping for personal items (toiletries/medicines) No Help Needed   Shopping for groceries No Help Needed   Driving No Help Needed   Climbing a flight of stairs No Help Needed   Getting to places beyond walking distances No Help Needed       Travel Screening:    Travel Screening     Question   Response    In the last month, have you been in contact with someone who was confirmed or suspected to have Coronavirus / COVID-19? No / Unsure    Have you had a COVID-19 viral test in the last 14 days? No    Do you have any of the following new or worsening symptoms? None of these    Have you traveled internationally or domestically in the last month? No      Travel History   Travel since 01/09/22    No documented travel since 01/09/22         Health Maintenance reviewed and discussed and ordered per Provider. Health Maintenance Due   Topic Date Due    DTaP/Tdap/Td series (1 - Tdap) Never done    Shingrix Vaccine Age 50> (1 of 2) Never done    Bone Densitometry (Dexa) Screening  Never done    Pneumococcal 65+ years (1 of 1 - PPSV23) Never done    Medicare Yearly Exam  Never done    COVID-19 Vaccine (3 - Booster for Moderna series) 08/02/2021    Lipid Screen  01/05/2022   . Coordination of Care:  1. \"Have you been to the ER, urgent care clinic since your last visit? Hospitalized since your last visit? \" No    2. \"Have you seen or consulted any other health care providers outside of the [address] [place] [address] since your last visit? \" Yes Where: Cardiology     3. For patients aged 39-70: Has the patient had a colonoscopy? NA - based on age     If the patient is female:    4. For patients aged 41-77: Has the patient had a mammogram within the past 2 years? NA - based on age    11. For patients aged 21-65: Has the patient had a pap smear?  NA - based on age

## 2022-02-10 ENCOUNTER — TELEPHONE (OUTPATIENT)
Dept: FAMILY MEDICINE CLINIC | Age: 82
End: 2022-02-10

## 2022-02-10 DIAGNOSIS — R60.0 BILATERAL EDEMA OF LOWER EXTREMITY: Primary | ICD-10-CM

## 2022-02-10 DIAGNOSIS — E55.9 HYPOVITAMINOSIS D: ICD-10-CM

## 2022-02-10 RX ORDER — FUROSEMIDE 20 MG/1
TABLET ORAL
Qty: 20 TABLET | Refills: 0 | Status: SHIPPED | OUTPATIENT
Start: 2022-02-10

## 2022-02-10 NOTE — TELEPHONE ENCOUNTER
Mirlande needs directions for RX Furosemide, patient stated the script did not have any directions on it.

## 2022-03-17 DIAGNOSIS — K21.9 GASTROESOPHAGEAL REFLUX DISEASE WITHOUT ESOPHAGITIS: ICD-10-CM

## 2022-03-17 RX ORDER — SIMVASTATIN 10 MG/1
TABLET, FILM COATED ORAL
Qty: 90 TABLET | Refills: 3 | Status: SHIPPED | OUTPATIENT
Start: 2022-03-17 | End: 2022-03-18 | Stop reason: SDUPTHER

## 2022-03-17 RX ORDER — HYDROGEN PEROXIDE 3 %
20 SOLUTION, NON-ORAL MISCELLANEOUS DAILY
Qty: 90 CAPSULE | Refills: 3 | Status: SHIPPED | OUTPATIENT
Start: 2022-03-17 | End: 2022-03-18 | Stop reason: SDUPTHER

## 2022-03-17 NOTE — TELEPHONE ENCOUNTER
Requested Prescriptions     Pending Prescriptions Disp Refills    simvastatin (ZOCOR) 10 mg tablet 90 Tablet 3     Si Tablet daily.

## 2022-03-17 NOTE — TELEPHONE ENCOUNTER
Requested Prescriptions     Pending Prescriptions Disp Refills    simvastatin (ZOCOR) 10 mg tablet 90 Tablet 3     Si Tablet daily.  esomeprazole (NexIUM) 20 mg capsule 90 Capsule 3     Sig: Take 1 Capsule by mouth daily.

## 2022-03-18 ENCOUNTER — TELEPHONE (OUTPATIENT)
Dept: FAMILY MEDICINE CLINIC | Age: 82
End: 2022-03-18

## 2022-03-18 DIAGNOSIS — E78.01 FAMILIAL HYPERCHOLESTEROLEMIA: Primary | ICD-10-CM

## 2022-03-18 DIAGNOSIS — K21.9 GASTROESOPHAGEAL REFLUX DISEASE WITHOUT ESOPHAGITIS: ICD-10-CM

## 2022-03-18 PROBLEM — F41.9 ANXIETY: Status: ACTIVE | Noted: 2021-03-26

## 2022-03-18 PROBLEM — I83.93 VARICOSE VEINS OF BOTH LOWER EXTREMITIES: Status: ACTIVE | Noted: 2020-06-16

## 2022-03-18 PROBLEM — E55.9 HYPOVITAMINOSIS D: Status: ACTIVE | Noted: 2021-01-04

## 2022-03-18 PROBLEM — I82.4Z1 ACUTE DEEP VEIN THROMBOSIS (DVT) OF DISTAL VEIN OF RIGHT LOWER EXTREMITY (HCC): Status: ACTIVE | Noted: 2020-06-16

## 2022-03-18 RX ORDER — HYDROGEN PEROXIDE 3 %
20 SOLUTION, NON-ORAL MISCELLANEOUS DAILY
Qty: 90 CAPSULE | Refills: 3 | Status: SHIPPED | OUTPATIENT
Start: 2022-03-18

## 2022-03-18 RX ORDER — SIMVASTATIN 10 MG/1
TABLET, FILM COATED ORAL
Qty: 90 TABLET | Refills: 3 | Status: SHIPPED | OUTPATIENT
Start: 2022-03-18

## 2022-03-18 NOTE — TELEPHONE ENCOUNTER
Patient called stated her 2 scripts were sent to [facility] and [facility] are [facility]

## 2022-03-19 PROBLEM — M06.9 RHEUMATOID ARTHRITIS (HCC): Status: ACTIVE | Noted: 2021-01-04

## 2022-03-19 PROBLEM — I82.4Z1 ACUTE VENOUS EMBOLISM AND THROMBOSIS OF DEEP VESSELS OF DISTAL END OF RIGHT LOWER EXTREMITY (HCC): Status: ACTIVE | Noted: 2020-01-31

## 2022-03-19 PROBLEM — R06.00 DYSPNEA: Status: ACTIVE | Noted: 2021-03-26

## 2022-03-19 PROBLEM — I82.5Y1 CHRONIC DEEP VEIN THROMBOSIS (DVT) OF PROXIMAL VEIN OF RIGHT LOWER EXTREMITY (HCC): Status: ACTIVE | Noted: 2020-03-19

## 2022-03-19 PROBLEM — R60.0 LOCALIZED EDEMA: Status: ACTIVE | Noted: 2018-02-14

## 2022-03-19 PROBLEM — I48.0 PAROXYSMAL ATRIAL FIBRILLATION (HCC): Status: ACTIVE | Noted: 2020-11-08

## 2022-03-20 PROBLEM — M35.3 POLYMYALGIA RHEUMATICA (HCC): Status: ACTIVE | Noted: 2021-03-26

## 2022-03-23 ENCOUNTER — TELEPHONE (OUTPATIENT)
Dept: FAMILY MEDICINE CLINIC | Age: 82
End: 2022-03-23

## 2022-03-23 NOTE — TELEPHONE ENCOUNTER
----- Message from Babita Copeland sent at 3/18/2022  1:22 PM EDT -----  Subject: Message to Provider    QUESTIONS  Information for Provider? patient is calling in to speak with the office   manager, personal matter that she needs taken care, this is alls the   information patient would give, please advise thank you   ---------------------------------------------------------------------------  --------------  CALL BACK INFO  What is the best way for the office to contact you? OK to leave message on   voicemail  Preferred Call Back Phone Number?  [phone]  ---------------------------------------------------------------------------  --------------  SCRIPT ANSWERS  undefined

## 2022-06-13 ENCOUNTER — HOSPITAL ENCOUNTER (OUTPATIENT)
Dept: PHYSICAL THERAPY | Age: 82
Discharge: HOME | End: 2022-06-13
Payer: MEDICARE

## 2022-06-13 PROCEDURE — 97161 PT EVAL LOW COMPLEX 20 MIN: CPT

## 2022-06-13 NOTE — THERAPY EVALUATION
PT INITIAL EVALUATION NOTE - [facility] 2-15    Patient Name: Gloria Husain  MRJK:  : [id number]  [x]  Patient  Verified  Payor: [place] MEDICARE / Plan: [place] MEDICARE PART A & B / Product Type: Medicare /    In time:1116  Out time:1200  Total Treatment Time (min): 44  Total Timed Codes (min): 44  1:1 Treatment Time ( [address] only): 40   Visit #: 1    Treatment Area: Pain in left hip [M25.552]  Sacroiliitis (HCC) [M46.1]    SUBJECTIVE  Pain Level (0-10 scale): 3/10  Any medication changes, allergies to medications, adverse drug reactions, diagnosis change, or new procedure performed?: [] No    [x] Yes (see summary sheet for update)  Subjective:    Pt is 78year old white female with complaints of left hip and back pain. Pt reports pain began about a month or 2 ago. Pain increases when she bends forward or sits on a commode. Pt reports no history of this pain. Pt reports history of left TKA, and feels like that left leg has been more sore since then. Pt with reports of increased anxiety over the physical therapy and treatment process. PLOF: Independent with all ADL's; no use of AD  Mechanism of Injury: insidious onset  Previous Treatment/Compliance: steroids provided temporary relief  Radiographs:   Left hip: Radiographs demonstrate mild SI degenerative change in mild   femoroacetabular degenerative change without evidence for an acute   appearing process.     What increases symptoms: standing; walking; sitting  What decreases symptoms: rest; propping up feet, medication; has not tried heat/ice  Work Hx: retired  Living Situation: lives alone; independent with cooking/cleaning/driving/bathing  Pt Goals: to feel more comfortable  Barriers: sedentary lifestyles   Motivation: Good  Substance use: None reported  Cognition: A&O x 4  Fall Assessment: TUG Test: 17 seconds   Pt reports fall about a month ago; was trying to take shoes off and lost her balance; no injury.    Past Medical History:  Past Medical History: Diagnosis Date    Acute deep vein thrombosis (DVT) of distal vein of right lower extremity ([address] 6/16/2020    Baker's cyst of knee     Left    Easy bruising     Essential hypertension 6/16/2020    Hypercholesteremia     Hypertension     Left arm pain     Neck pain     Osteoarthritis of both knees     Rapid pulse     Right elbow pain     Right hand pain     Right lateral epicondylitis     Swelling of joint of left wrist     Thoracic kyphosis     Cervical muscle stiffness    Wears glasses      Past Surgical History:  No past surgical history on file. Current Medications:  Current Outpatient Medications   Medication Instructions    amLODIPine (NORVASC) 2.5 mg tablet TAKE 1 TABLET TWICE A DAY    apremilast (OTEZLA) 30 mg, Oral, DAILY    bisoprolol (ZEBETA) 5 mg tablet TAKE 1 TABLET TWICE A DAY    CALCIUM CARB/VIT D3/MINERALS (CALCIUM-VITAMIN D PO) Take  by mouth.  cranberry fruit extract (CRANBERRY PO) Oral    cyanocobalamin, vitamin B-12, (VITAMIN B-12 PO) 1,000 mcg, Oral    DOCOSAHEXANOIC ACID/EPA (FISH OIL PO) Take  by mouth.  Eliquis 5 mg, Oral, 2 TIMES DAILY    esomeprazole (NEXIUM) 20 mg, Oral, DAILY    furosemide (LASIX) 20 mg tablet Take 1 tablet daily as needed.  lisinopriL (PRINIVIL, ZESTRIL) 10 mg, Oral, DAILY    LORazepam (ATIVAN) 1 mg, Oral, DAILY AS NEEDED    simvastatin (ZOCOR) 10 mg tablet TAKE 1 TABLET DAILY    turmeric root extract 500 mg, Oral          OBJECTIVE/EXAMINATION  Posture: Forward shoulders; rounded posture  Gait and Functional Mobility:  No AD; wide wilberto; slow guarded gait  Palpation: no ttp along lumbar spinous process and paraspinals. TTP at left SI joint and around left piriformis region. No ttop along left GT region.      Hip:  Strength AROM     Right Left Right Left    Flexion 4+/5 4/5 [facility] WFL   Knee:  Strength AROM     Right Left Right Left    Flexion 5/5 5/5 [facility]    Extension 5/5 4+/5 [facility]   Ankle  Strength AROM     Right Left Right Left [facility]iflexion 5/5 5/5 [facility]    Platarflexion 5/5 5/5 [facility] WFL   All ROM measurements are measured in degrees.     Neurological: Reflexes / Sensations: wfl  Special Tests:   Leg length discrepancy: none  ASIS equal B  SI compression and distraction test: -    Pain Level (0-10 scale) post treatment: 3/10    ASSESSMENT/Changes in Function:   [x]  See Plan of Daniel. 49, PT, DPT  6/13/2022

## 2022-06-13 NOTE — THERAPY EVALUATION
[address]  [place], [address]  Ph: [phone]    Fax: [phone]    Plan of Care/Statement of Necessity for Physical Therapy Services  2-15    Patient name: Stacey Melendez  : 1940  Provider#: [id number]  Referral source: Chacho Partida MD      Medical/Treatment Diagnosis: Pain in left hip [M25.552]  Sacroiliitis ([address] [M46.1]     Prior Hospitalization: see medical history     Comorbidities: see medical history  Prior Level of Function: Independent with all ADL's; no use of AD  Medications: Verified on Patient Summary List  Start of Care: 2022      Onset Date: May 2022   The [address] [place] [address] and following information is based on the information from the initial evaluation. Assessment/ key information:   Patient presents to physical therapy with left sided SI/buttock pain, decreased ROM, and lower extremity weakness limiting functional activities. The patient would benefit from physical therapy to utilize modalities to decrease pain, increase ROM, and strength to maximize function. Pt will receive treatment including lower extremity flexibility, ROM, strengthening, modalities for pain control, functional activities, core stabilization, balance, and proprioception activities. Patient's symptoms are consistent with possible nerve compression in piriformis region and SI joint inflammation/discomfort. Patient will be instructed in HEP with 1:1 supervision and given pictures to facilitate compliance. Patient would benefit from skilled physical therapy to progress toward goals and maximize function.   Thank you for this referral.       Evaluation Complexity History LOW Complexity : Zero comorbidities / personal factors that will impact the outcome / POC; Examination LOW Complexity : 1-2 Standardized tests and measures addressing body structure, function, activity limitation and / or participation in recreation  ;Presentation LOW Complexity : Stable, uncomplicated  ;Clinical Decision Making TUG Score: 17 seconds  Overall Complexity Rating: LOW     Problem List: pain affecting function, decrease ROM, decrease strength, edema affecting function, impaired gait/ balance, decrease ADL/ functional abilitiies, decrease activity tolerance, decrease flexibility/ joint mobility and decrease transfer abilities   Treatment Plan may include any combination of the following: Therapeutic exercise, Therapeutic activities, Neuromuscular re-education, Physical agent/modality, Gait/balance training, Manual therapy, Patient education, Self Care training, Functional mobility training, Home safety training and Stair training  Patient / Family readiness to learn indicated by: asking questions, trying to perform skills and interest  Persons(s) to be included in education: patient (P)  Barriers to Learning/Limitations: None  Patient Goal (s): to get back to normal  Patient Self Reported Health Status: good  Rehabilitation Potential: good    Short Term Goals: To be accomplished in 5 treatments. 1. Patient will demonstrate independence and compliance with HEP in order to assist with carryover from PT services. 2.   Patient will be able to sit on the commode without pain greater than or equal to 3/10 to increase functional mobility. 3.   Patient will be able to return to walking for 30 minutes without pain greater than or equal to 1/10 to increase functional mobility. Long Term Goals: To be accomplished in 10 treatments. 1.   Patient will be able to ambulate with increased stability per pt report to increase functional mobility. 2.   Patient will increase left hip flexion mmt to 5/5 in order to complete all ADLs without fear of falling. Frequency / Duration: Patient to be seen 2 times per week for 10 treatments.     Patient/ Caregiver education and instruction: self care and exercises    [x]  Plan of care has been reviewed with PTA        Certification Period: 2022 to 2022    Hermilo Beatty, PT, DPT  2022     ________________________________________________________________________    I certify that the above Therapy Services are being furnished while the patient is under my care. I agree with the treatment plan and certify that this therapy is necessary.     [de-identified] Signature:____________________  Date:____________Time: _________    Patient name: Jennifer Sanchez  : 1940  Provider#: [id number]

## 2022-06-15 ENCOUNTER — HOSPITAL ENCOUNTER (OUTPATIENT)
Dept: PHYSICAL THERAPY | Age: 82
Discharge: HOME | End: 2022-06-15
Payer: MEDICARE

## 2022-06-15 PROCEDURE — 97016 VASOPNEUMATIC DEVICE THERAPY: CPT

## 2022-06-15 PROCEDURE — 97150 GROUP THERAPEUTIC PROCEDURES: CPT

## 2022-06-15 PROCEDURE — 97014 ELECTRIC STIMULATION THERAPY: CPT

## 2022-06-15 PROCEDURE — 97110 THERAPEUTIC EXERCISES: CPT

## 2022-06-22 ENCOUNTER — APPOINTMENT (OUTPATIENT)
Dept: PHYSICAL THERAPY | Age: 82
End: 2022-06-22
Payer: MEDICARE

## 2022-06-23 ENCOUNTER — APPOINTMENT (OUTPATIENT)
Dept: PHYSICAL THERAPY | Age: 82
End: 2022-06-23
Payer: MEDICARE

## 2022-06-24 ENCOUNTER — HOSPITAL ENCOUNTER (OUTPATIENT)
Dept: PHYSICAL THERAPY | Age: 82
Discharge: HOME | End: 2022-06-24
Payer: MEDICARE

## 2022-06-24 PROCEDURE — 97110 THERAPEUTIC EXERCISES: CPT

## 2022-06-24 PROCEDURE — 97014 ELECTRIC STIMULATION THERAPY: CPT

## 2022-06-24 NOTE — PROGRESS NOTES
PT DAILY TREATMENT NOTE - [facility] 2-15    Patient Name: Isaac Sifuentes  Date:2022  : 1940  [x]  Patient  Verified  Payor: Severiano Quinton / Plan: [place] MED[place] PART A & B / Product Type: Medicare /    In time: 9:30 AM  Out time:10:20 AM  Total Treatment Time (min): 50  Total Timed Codes (min):40  1:1 Treatment Time [facility] only):40   Visit #:  3    Treatment Area: Pain in left hip [M25.552]  Sacroiliitis ([address] [M46.1]    SUBJECTIVE  Pain Level (0-10 scale): 3/10  Any medication changes, allergies to medications, adverse drug reactions, diagnosis change, or new procedure performed?: [x] No    [] Yes (see summary sheet for update)  Subjective functional status/changes:     I try to stay active and continue to walk everyday. Pain is in my left hip and buttocks    OBJECTIVE    Modality rationale: decrease inflammation, decrease pain and increase tissue extensibility to improve the patients ability  to return to walking for 30 minutes without pain greater than or equal to 1/10 to increase functional mobility.      Min Type Additional Details      15 [x] Estim: []Att   []Unatt    []TENS instruct                  [x]IFC  []Premod   []NMES                    []Other:  []w/US      []w/ heat  [x]w/ ice  Position: supine  Location: left hip and buttocks       []  Traction: [] Cervical       []Lumbar                       [] Prone          []Supine                       []Intermittent   []Continuous Lbs:  [] before manual  [] after manual  [] w/ heat  [] Simultaneously performed with w/ Estim    []  Ultrasound: []Continuous   [] Pulsed                       at: []1MHz   []3MHz Location:  W/cm2:    [] Paraffin         Location:   []w/heat    []  Ice     []  Heat  []  Ice massage Position:  Location:    []  Laser  []  Other: Position:  Location:      []  Vasopneumatic Device Pressure:       [] lo [] med [] hi   [] w/ ice      Temperature:   [] Simultaneously performed with w/ Estim     [x] Skin assessment post-treatment: [x]intact []redness- no adverse reaction     []redness - adverse reaction:     40 min Therapeutic Exercise:  [x] See flow sheet :   Rationale: increase ROM and increase strength to improve the patients ability  to return to walking for 30 minutes without pain greater   than or equal to 1/10 to increase functional mobility. With   [] TE   [] TA   [] neuro   [] other: Patient Education: [x] Review HEP    [] Progressed/Changed HEP based on:   [] positioning   [] body mechanics   [] transfers   [] heat/ice application    [] other:      Other Objective: Lumbar and piriforms stretches in supine position followed by  pain modalities for inflammation to left hip and buttocks     Pain Level (0-10 scale) post treatment: 1/10    ASSESSMENT/Changes in Function:   The pt tolerated treatment. Patient is very active and flexible. Provided exercise program handout on next visit. Patient has ITB  Tightness on the left. May benefit from additional stretches. Patient will continue to benefit from skilled PT services to modify and progress therapeutic interventions, address functional mobility deficits, address ROM deficits, address strength deficits and analyze and address soft tissue restrictions to attain remaining goals. [x]  See Plan of Care  []  See progress note/recertification  []  See Discharge Summary         Progress towards goals / Updated goals:  Short Term Goals: To be accomplished in 5 treatments. 1. Patient will demonstrate independence and compliance with HEP in order to assist with carryover from PT services. 2.   Patient will be able to sit on the commode without pain greater than or equal to 3/10 to increase functional mobility. 3.   Patient will be able to return to walking for 30 minutes without pain greater than or equal to 1/10 to increase functional mobility.     Long Term Goals: To be accomplished in 10 treatments.   1.   Patient will be able to ambulate with increased stability per pt report to increase functional mobility. 2.   Patient will increase left hip flexion mmt to 5/5 in order to complete all ADLs without fear of falling.     PLAN  [x]  Upgrade activities as tolerated     [x]  Continue plan of care  []  Update interventions per flow sheet       []  Discharge due to:_  []  Other:_      Rosie Avalos, PT     6/24/22

## 2022-06-27 ENCOUNTER — HOSPITAL ENCOUNTER (OUTPATIENT)
Dept: PHYSICAL THERAPY | Age: 82
Discharge: HOME | End: 2022-06-27
Payer: MEDICARE

## 2022-06-27 PROCEDURE — 97150 GROUP THERAPEUTIC PROCEDURES: CPT

## 2022-06-27 PROCEDURE — 97110 THERAPEUTIC EXERCISES: CPT

## 2022-06-27 PROCEDURE — 97014 ELECTRIC STIMULATION THERAPY: CPT

## 2022-06-27 NOTE — PROGRESS NOTES
PT DAILY TREATMENT NOTE - [facility] 2-15    Patient Name: Liya Avina  Date:2022  : 1940  [x]  Patient  Verified  Payor: Baljinder Ferreriver / Plan: [place] MEDICARE PART A & B / Product Type: Medicare /    In time: 9695  Out time: 905  Total Treatment Time (min): 56  Total Timed Codes (min):56  1:1 Treatment Time [facility] only):56   Visit #:  4    Treatment Area: Pain in left hip [M25.552]  Sacroiliitis ([address] [M46.1]    SUBJECTIVE  Pain Level (0-10 scale): 1/10  Any medication changes, allergies to medications, adverse drug reactions, diagnosis change, or new procedure performed?: [x] No    [] Yes (see summary sheet for update)  Subjective functional status/changes:     \"I think it feels better. \"    OBJECTIVE    Modality rationale: decrease inflammation, decrease pain and increase tissue extensibility to improve the patients ability  to return to walking for 30 minutes without pain greater than or equal to 1/10 to increase functional mobility.      Min Type Additional Details      10 [x] Estim: []Att   []Unatt    []TENS instruct                  [x]IFC  []Premod   []NMES                    []Other:  []w/US      []w/ heat  [x]w/ ice  Position: supine  Location: estim to lower back; ice to lower back and left hip       []  Traction: [] Cervical       []Lumbar                       [] Prone          []Supine                       []Intermittent   []Continuous Lbs:  [] before manual  [] after manual  [] w/ heat  [] Simultaneously performed with w/ Estim    []  Ultrasound: []Continuous   [] Pulsed                       at: []1MHz   []3MHz Location:  W/cm2:    [] Paraffin         Location:   []w/heat    []  Ice     []  Heat  []  Ice massage Position:  Location:    []  Laser  []  Other: Position:  Location:      []  Vasopneumatic Device Pressure:       [] lo [] med [] hi   [] w/ ice      Temperature:   [] Simultaneously performed with w/ Estim     [x] Skin assessment post-treatment:  [x]intact []redness- no adverse reaction     []redness - adverse reaction:     35 min Therapeutic Exercise:  [x] See flow sheet :   Rationale: increase ROM and increase strength to improve the patients ability  to return to walking for 30 minutes without pain greater   than or equal to 1/10 to increase functional mobility. 15 min Group Therapy:  [x] See flow sheet :   Rationale: billed while completing therex. With   [x] TE   [] TA   [] neuro   [] other: Patient Education: [x] Review HEP    [] Progressed/Changed HEP based on:   [] positioning   [] body mechanics   [] transfers   [] heat/ice application    [] other:      Other Objective: Lumbar and piriforms stretches in supine position followed by pain modalities for inflammation in supine position. Pain Level (0-10 scale) post treatment: 1/10    ASSESSMENT/Changes in Function:   The pt tolerated treatment fair today. Addition of supine IT band stretch, which pt tolerated well. Pt continues to requires cues and guidance for proper technique and to feel comfortable with stretches and exercises being provided. Patient will continue to benefit from skilled PT services to modify and progress therapeutic interventions, address functional mobility deficits, address ROM deficits, address strength deficits and analyze and address soft tissue restrictions to attain remaining goals. [x]  See Plan of Care  []  See progress note/recertification  []  See Discharge Summary         Progress towards goals / Updated goals:  Short Term Goals: To be accomplished in 5 treatments. 1. Patient will demonstrate independence and compliance with HEP in order to assist with carryover from PT services. 2.   Patient will be able to sit on the commode without pain greater than or equal to 3/10 to increase functional mobility.   3.   Patient will be able to return to walking for 30 minutes without pain greater than or equal to 1/10 to increase functional mobility.     Long Term Goals: To be accomplished in 10 treatments. 1.   Patient will be able to ambulate with increased stability per pt report to increase functional mobility. 2.   Patient will increase left hip flexion mmt to 5/5 in order to complete all ADLs without fear of falling.     PLAN  [x]  Upgrade activities as tolerated     [x]  Continue plan of care  []  Update interventions per flow sheet       []  Discharge due to:_  []  Other:_      Barbara Ochoa, PT, DPT     6/24/22

## 2022-06-29 ENCOUNTER — APPOINTMENT (OUTPATIENT)
Dept: PHYSICAL THERAPY | Age: 82
End: 2022-06-29
Payer: MEDICARE

## 2022-06-30 ENCOUNTER — HOSPITAL ENCOUNTER (OUTPATIENT)
Dept: PHYSICAL THERAPY | Age: 82
Discharge: HOME | End: 2022-06-30
Payer: MEDICARE

## 2022-06-30 PROCEDURE — 97014 ELECTRIC STIMULATION THERAPY: CPT

## 2022-06-30 PROCEDURE — 97110 THERAPEUTIC EXERCISES: CPT

## 2022-06-30 PROCEDURE — 97150 GROUP THERAPEUTIC PROCEDURES: CPT

## 2022-06-30 NOTE — PROGRESS NOTES
PT DAILY TREATMENT NOTE - [facility] 2-15    Patient Name: Sophia Skinner  Date:2022  : 1940  [x]  Patient  Verified  Payor: [place] MEDICARE / Plan: VA MEDICARE PART A & B / Product Type: Medicare /    In time: 11:01  Out time: 12:04  Total Treatment Time (min): 63  Total Timed Codes (min): 30  1:1 Treatment Time ([facility] only): 30   Visit #:  5    Treatment Area: Pain in left hip [M25.552]  Sacroiliitis ([address] [M46.1]    SUBJECTIVE  Pain Level (0-10 scale): 2/10  Any medication changes, allergies to medications, adverse drug reactions, diagnosis change, or new procedure performed?: [x] No    [] Yes (see summary sheet for update)  Subjective functional status/changes:     Pt states it depends on what she does in the mornings depends on her pain    OBJECTIVE    Modality rationale: decrease edema, decrease inflammation and decrease pain to improve the patients ability to be able to perform AROM   Min Type Additional Details      10 [x] Estim: []Att   [x]Unatt    []TENS instruct                  []IFC  [x]Premod   []NMES                    []Other:  []w/US      []w/ heat  [x]w/ ice  Position: supine  Location: ch 1 low back; ch 2 L hip       []  Traction: [] Cervical       []Lumbar                       [] Prone          []Supine                       []Intermittent   []Continuous Lbs:  [] before manual  [] after manual  [] w/ heat  [] Simultaneously performed with w/ Estim    []  Ultrasound: []Continuous   [] Pulsed                       at: []1MHz   []3MHz Location:  W/cm2:    [] Paraffin         Location:   []w/heat    []  Ice     []  Heat  []  Ice massage Position:  Location:    []  Laser  []  Other: Position:  Location:      []  Vasopneumatic Device Pressure:       [] lo [] med [] hi   [] w/ ice      Temperature:   [] Simultaneously performed with w/ Estim     [x] Skin assessment post-treatment:  [x]intact []redness- no adverse reaction     []redness - adverse reaction:     30 min Therapeutic Exercise:  [x] See flow sheet :   Rationale: increase ROM and increase strength to improve the patients ability to improve functional mobility         23 min Group Therapy:  [x] See flow sheet :   Billed while completing TE with another pt at beginning of session    With   [x] TE   [] TA   [] neuro   [] other: Patient Education: [x] Review HEP    [] Progressed/Changed HEP based on:   [] positioning   [] body mechanics   [] transfers   [] heat/ice application    [] other:      Other Objective/Functional Measures: Added 3 way stretch with ball    Pain Level (0-10 scale) post treatment: 0/10    ASSESSMENT/Changes in Function: The pt tolerated treatment fairly well. Pt did well with new stretch with swiss ball. No c/o increased pain. Pt knowledgeable to exercises. Patient will continue to benefit from skilled PT services to address functional mobility deficits, address ROM deficits and address strength deficits to attain remaining goals. [x]  See Plan of Care  []  See progress note/recertification  []  See Discharge Summary         Progress towards goals / Updated goals:  Short Term Goals: To be accomplished in 5 treatments. 1. Patient will demonstrate independence and compliance with HEP in order to assist with carryover from PT services. 2.   Patient will be able to sit on the commode without pain greater than or equal to 3/10 to increase functional mobility. 3.   Patient will be able to return to walking for 30 minutes without pain greater than or equal to 1/10 to increase functional mobility.     Long Term Goals: To be accomplished in 10 treatments. 1.   Patient will be able to ambulate with increased stability per pt report to increase functional mobility. 2.   Patient will increase left hip flexion mmt to 5/5 in order to complete all ADLs without fear of falling.     PLAN  [x]  Upgrade activities as tolerated     [x]  Continue plan of care  []  Update interventions per flow sheet       []  Discharge due to:_  []  Other:_ Lena Andrade, PTA, LPTA 6/30/2022

## 2022-07-06 ENCOUNTER — APPOINTMENT (OUTPATIENT)
Dept: PHYSICAL THERAPY | Age: 82
End: 2022-07-06
Payer: MEDICARE

## 2022-07-08 ENCOUNTER — HOSPITAL ENCOUNTER (OUTPATIENT)
Dept: PHYSICAL THERAPY | Age: 82
Discharge: HOME | End: 2022-07-08
Payer: MEDICARE

## 2022-07-08 PROCEDURE — 97014 ELECTRIC STIMULATION THERAPY: CPT

## 2022-07-08 PROCEDURE — 97110 THERAPEUTIC EXERCISES: CPT

## 2022-07-08 NOTE — PROGRESS NOTES
PT DAILY TREATMENT NOTE - [facility] 2-15    Patient Name: Talia Venegas  Date:2022  : 1940  [x]  Patient  Verified  Payor: VA MEDICARE / Plan: [place] MEDICARE PART A & B / Product Type: Medicare /    In time:9:48  Out time:11:14  Total Treatment Time (min): 86  Total Timed Codes (min): 76  1:1 Treatment Time ( [address] only): 68   Visit #:  6    Treatment Area: Pain in left hip [M25.552]  Sacroiliitis ([address] [M46.1]    SUBJECTIVE  Pain Level (0-10 scale): 2/10  Any medication changes, allergies to medications, adverse drug reactions, diagnosis change, or new procedure performed?: [x] No    [] Yes (see summary sheet for update)  Subjective functional status/changes:     \"I don't hurt too bad. Luis Ra \"    OBJECTIVE    Modality rationale: decrease edema, decrease inflammation and decrease pain to improve the patients ability to decrease pain and increase tolerance to all functional mobility.     Min Type Additional Details      10 [x] Estim: []Att   [x]Unatt    []TENS instruct                  []IFC  [x]Premod   []NMES                    []Other:  []w/US      []w/ heat  [x]w/ ice  Position:Supine  Location:LB and left hip       []  Traction: [] Cervical       []Lumbar                       [] Prone          []Supine                       []Intermittent   []Continuous Lbs:  [] before manual  [] after manual  [] w/ heat  [] Simultaneously performed with w/ Estim    []  Ultrasound: []Continuous   [] Pulsed                       at: []1MHz   []3MHz Location:  W/cm2:    [] Paraffin         Location:   []w/heat   10 [x]  Ice     []  Heat  []  Ice massage Position:supine  Location:LB and  left hip    []  Laser  []  Other: Position:  Location:      []  Vasopneumatic Device Pressure:       [] lo [] med [] hi   [] w/ ice      Temperature:   [] Simultaneously performed with w/ Estim     [x] Skin assessment post-treatment:  [x]intact []redness- no adverse reaction     []redness - adverse reaction:     76 min Therapeutic Exercise:  [x] See flow sheet :   Rationale: increase ROM, increase strength and improve balance to improve the patients ability to attain and maintain highest practicable functional capacity. With   [] TE   [] TA   [] neuro   [] other: Patient Education: [x] Review HEP    [] Progressed/Changed HEP based on:   [] positioning   [] body mechanics   [] transfers   [] heat/ice application    [] other:      Other Objective/Functional Measures: N/A    Pain Level (0-10 scale) post treatment: 1/10    ASSESSMENT/Changes in Function:   The pt tolerated addition of standing bilateral LE exercises and increased reps with seated 3 way pray stretch without complaint. Demonstrated benefit from skilled P.T. services this date as evidenced by report of decreased pain from 2/10 upon arrival to 1/10 at close of session. Patient will continue to benefit from skilled PT services to modify and progress therapeutic interventions, address functional mobility deficits, address ROM deficits, address strength deficits, analyze and address soft tissue restrictions and analyze and cue movement patterns to attain remaining goals. [x]  See Plan of Care  []  See progress note/recertification  []  See Discharge Summary         Progress towards goals / Updated goals:  Short Term Goals: To be accomplished in 5 treatments. 1. Patient will demonstrate independence and compliance with HEP in order to assist with carryover from PT services. 2.   Patient will be able to sit on the commode without pain greater than or equal to 3/10 to increase functional mobility. 3.   Patient will be able to return to walking for 30 minutes without pain greater than or equal to 1/10 to increase functional mobility.     Long Term Goals: To be accomplished in 10 treatments. 1.   Patient will be able to ambulate with increased stability per pt report to increase functional mobility.   2.   Patient will increase left hip flexion mmt to 5/5 in order to complete all ADLs without fear of falling.     PLAN  [x]  Upgrade activities as tolerated     [x]  Continue plan of care  []  Update interventions per flow sheet       []  Discharge due to:_  []  Other:_      Georgina Holm PTA, L.P.T.A.  7/8/2022

## 2022-07-11 ENCOUNTER — HOSPITAL ENCOUNTER (OUTPATIENT)
Dept: PHYSICAL THERAPY | Age: 82
Discharge: HOME | End: 2022-07-11
Payer: MEDICARE

## 2022-07-11 PROCEDURE — 97110 THERAPEUTIC EXERCISES: CPT

## 2022-07-11 PROCEDURE — 97014 ELECTRIC STIMULATION THERAPY: CPT

## 2022-07-11 NOTE — PROGRESS NOTES
PT DAILY TREATMENT NOTE - [facility] 2-15    Patient Name: Kevan Vasquez  Date:2022  : 1940  [x]  Patient  Verified  Payor: Susan Quintana / Plan: [place] MEDICARE PART A & B / Product Type: Medicare /    In time: 1000  Out time: 1106  Total Treatment Time (min): 56  Total Timed Codes (min): 46  1:1 Treatment Time ( only): 64   Visit #:  7    Treatment Area: Pain in left hip [M25.552]  Sacroiliitis (HCC) [M46.1]    SUBJECTIVE  Pain Level (0-10 scale): 0/10  Any medication changes, allergies to medications, adverse drug reactions, diagnosis change, or new procedure performed?: [x] No    [] Yes (see summary sheet for update)  Subjective functional status/changes:     \"I am feeling better. I did my stretches a lot at home. \"    OBJECTIVE    Modality rationale: decrease edema, decrease inflammation and decrease pain to improve the patients ability to decrease pain and increase tolerance to all functional mobility.     Min Type Additional Details      10 [x] Estim: []Att   [x]Unatt    []TENS instruct                  []IFC  [x]Premod   []NMES                    []Other:  []w/US      []w/ heat  [x]w/ ice  Position:Supine  Location:LB and left hip       []  Traction: [] Cervical       []Lumbar                       [] Prone          []Supine                       []Intermittent   []Continuous Lbs:  [] before manual  [] after manual  [] w/ heat  [] Simultaneously performed with w/ Estim    []  Ultrasound: []Continuous   [] Pulsed                       at: []1MHz   []3MHz Location:  W/cm2:    [] Paraffin         Location:   []w/heat    []  Ice     []  Heat  []  Ice massage Position:supine  Location:LB and  left hip    []  Laser  []  Other: Position:  Location:      []  Vasopneumatic Device Pressure:       [] lo [] med [] hi   [] w/ ice      Temperature:   [] Simultaneously performed with w/ Estim     [x] Skin assessment post-treatment:  [x]intact []redness- no adverse reaction     []redness - adverse reaction:     45 min Therapeutic Exercise:  [x] See flow sheet :   Rationale: increase ROM, increase strength and improve balance to improve the patients ability to attain and maintain highest practicable functional capacity. With   [x] TE   [] TA   [] neuro   [] other: Patient Education: [x] Review HEP    [] Progressed/Changed HEP based on:   [] positioning   [] body mechanics   [] transfers   [] heat/ice application    [] other:      Other Objective/Functional Measures: N/A    Pain Level (0-10 scale) post treatment: 0/10    ASSESSMENT/Changes in Function:   Pt tolerated treatment well. Pt is progressing well and it is clear she is compliant with HEP. Continued with addition of exercises from prior treatment session. Patient will continue to benefit from skilled PT services to modify and progress therapeutic interventions, address functional mobility deficits, address ROM deficits, address strength deficits, analyze and address soft tissue restrictions and analyze and cue movement patterns to attain remaining goals. [x]  See Plan of Care  []  See progress note/recertification  []  See Discharge Summary         Progress towards goals / Updated goals:  Short Term Goals: To be accomplished in 5 treatments. 1. Patient will demonstrate independence and compliance with HEP in order to assist with carryover from PT services. 2.   Patient will be able to sit on the commode without pain greater than or equal to 3/10 to increase functional mobility. 3.   Patient will be able to return to walking for 30 minutes without pain greater than or equal to 1/10 to increase functional mobility.     Long Term Goals: To be accomplished in 10 treatments. 1.   Patient will be able to ambulate with increased stability per pt report to increase functional mobility.   2.   Patient will increase left hip flexion mmt to 5/5 in order to complete all ADLs without fear of falling.     PLAN  [x]  Upgrade activities as tolerated     [x] Continue plan of care  []  Update interventions per flow sheet       []  Discharge due to:_  []  Other:_      Esperanza Ontiveros, PT, DPT 7/11/2022

## 2022-07-13 ENCOUNTER — APPOINTMENT (OUTPATIENT)
Dept: PHYSICAL THERAPY | Age: 82
End: 2022-07-13
Payer: MEDICARE

## 2022-07-14 ENCOUNTER — HOSPITAL ENCOUNTER (OUTPATIENT)
Dept: PHYSICAL THERAPY | Age: 82
Discharge: HOME | End: 2022-07-14
Payer: MEDICARE

## 2022-07-14 PROCEDURE — 97110 THERAPEUTIC EXERCISES: CPT

## 2022-07-14 NOTE — THERAPY DISCHARGE
[address]  [place], [place]  Ph: [phone]     Fax: [phone]    Discharge Summary 2-15    Patient name: Hari Carr  : 1940  Provider#: [id number]  Referral source: Ron Figueredo MD      Medical/Treatment Diagnosis: Pain in left hip [M25.552]  Sacroiliitis ([address] [M46.1]     Prior Hospitalization: see medical history     Comorbidities: See Plan of Care  Prior Level of Function: See Plan of Care  Medications: Verified on Patient Summary List    Start of Care: 2022      Onset Date:2022   Visits from Start of Care: 8   Missed Visits: 0  Reporting Period : 2022 to 2022    Assessment / Summary of care:   Patient presented to physical therapy with complaints of lower back and left hip pain, decreased ROM, and lower extremity weakness limiting functional activities. Pt has received treatment including lumbopelvic and lower extremity flexibility, ROM, strengthening, pelvic and core stabilization, functional activities, and education in posture and body mechanics. Patient has demonstrated overall progress in strength, rom, pain level, and functional mobility. Pt is now ambulating at a faster, more confident pace, with improved gait technique. Home exercise program reviewed today with no concerns voiced by patient. Patient to be discharged at this time secondary to all goals met and current level of functional mobility. Thank you for this referral.     Progress towards goals / Updated goals:  Short Term Goals: To be accomplished in 5 treatments. 1. Patient will demonstrate independence and compliance with HEP in order to assist with carryover from PT services. met  2.   Patient will be able to sit on the commode without pain greater than or equal to 3/10 to increase functional mobility. met  3.   Patient will be able to return to walking for 30 minutes without pain greater than or equal to 1/10 to increase functional mobility. met     Long Term Goals: To be accomplished in 10 treatments. 1.   Patient will be able to ambulate with increased stability per pt report to increase functional mobility. met  2.   Patient will increase left hip flexion mmt to 5/5 in order to complete all ADLs without fear of falling.  Partially met; 4+/5        RECOMMENDATIONS:  [x]Discontinue therapy: [x]Patient has reached or is progressing toward set goals     []Patient is non-compliant or has abdicated     []Due to lack of appreciable progress towards set goals     []Other    Akil Huang, PT, DPT    7/14/2022

## 2022-07-14 NOTE — PROGRESS NOTES
PT DAILY TREATMENT NOTE - [facility] 2-15    Patient Name: Jeremy Reza  Date:2022  : 1940  [x]  Patient  Verified  Payor: [employer] / Plan: [place] MEDICARE PART A & B / Product Type: Medicare /    In time: 1106  Out time: 1135  Total Treatment Time (min): 29  Total Timed Codes (min): 29  1:1 Treatment Time ( only): 29   Visit #:  8    Treatment Area: Pain in left hip [M25.552]  Sacroiliitis ([address] [M46.1]    SUBJECTIVE  Pain Level (0-10 scale): 0/10  Any medication changes, allergies to medications, adverse drug reactions, diagnosis change, or new procedure performed?: [x] No    [] Yes (see summary sheet for update)  Subjective functional status/changes:     \"I am feeling better. I am doing my exercises at home. I haven't had any trouble since the last time I was here. \"    OBJECTIVE  Posture: Forward shoulders; rounded posture  Gait and Functional Mobility:  No AD; more confident with gait; increased gait speed. Palpation: no ttp along lumbar spinous process and paraspinals. No TTP at left SI joint and around left piriformis region. No ttp along left GT region. Tug test: 9 seconds             Hip:   Strength AROM       Right Left Right Left     [facility]5 [facility]   Knee:   Strength AROM       Right Left Right Left     Flexion 5/5 5/5 [facility] WFL     Extension 5/5 5/5 [facility] WFL   Ankle   Strength AROM       Right Left Right Left     Dorsiflexion 5/5 5/5 WFL WFL     Platarflexion 5/5 5/5 [facility] WFL   All ROM measurements are measured in degrees.     Neurological: Reflexes / Sensations: wfl  Special Tests:   Leg length discrepancy: none  ASIS equal B  SI compression and distraction test: -    25 min Therapeutic Exercise:  [x] See flow sheet :   Rationale: increase ROM, increase strength and improve balance to improve the patients ability to attain and maintain highest practicable functional capacity.      With   [x] TE   [] TA   [] neuro   [] other: Patient Education: [x] Review HEP    [] Progressed/Changed HEP based on:   [] positioning   [] body mechanics   [] transfers   [] heat/ice application    [] other:      Other Objective/Functional Measures: N/A    Pain Level (0-10 scale) post treatment: 0/10    ASSESSMENT/Changes in Function:   Patient presented to physical therapy with complaints of lower back and left hip pain, decreased ROM, and lower extremity weakness limiting functional activities. Pt has received treatment including lumbopelvic and lower extremity flexibility, ROM, strengthening, pelvic and core stabilization, functional activities, and education in posture and body mechanics. Patient has demonstrated overall progress in strength, rom, pain level, and functional mobility. Pt is now ambulating at a faster, more confident pace, with improved gait technique. Home exercise program reviewed today with no concerns voiced by patient. Patient to be discharged at this time secondary to all goals met and current level of functional mobility. Thank you for this referral.     []  See Plan of Care  []  See progress note/recertification  [x]  See Discharge Summary         Progress towards goals / Updated goals:  Short Term Goals: To be accomplished in 5 treatments. 1. Patient will demonstrate independence and compliance with HEP in order to assist with carryover from PT services. met  2.   Patient will be able to sit on the commode without pain greater than or equal to 3/10 to increase functional mobility. met  3.   Patient will be able to return to walking for 30 minutes without pain greater than or equal to 1/10 to increase functional mobility. met     Long Term Goals: To be accomplished in 10 treatments. 1.   Patient will be able to ambulate with increased stability per pt report to increase functional mobility. met  2.   Patient will increase left hip flexion mmt to 5/5 in order to complete all ADLs without fear of falling.  Partially met; 4+/5     PLAN  []  Upgrade activities as tolerated     []  Continue plan of care  []  Update interventions per flow sheet       [x]  Discharge due to:_  []  Other:_      Manjeet Fraga, PT, DPT 7/14/2022

## 2022-07-18 ENCOUNTER — APPOINTMENT (OUTPATIENT)
Dept: PHYSICAL THERAPY | Age: 82
End: 2022-07-18
Payer: MEDICARE

## 2022-07-20 ENCOUNTER — APPOINTMENT (OUTPATIENT)
Dept: PHYSICAL THERAPY | Age: 82
End: 2022-07-20
Payer: MEDICARE

## 2022-07-25 ENCOUNTER — APPOINTMENT (OUTPATIENT)
Dept: PHYSICAL THERAPY | Age: 82
End: 2022-07-25
Payer: MEDICARE

## 2022-07-27 ENCOUNTER — APPOINTMENT (OUTPATIENT)
Dept: PHYSICAL THERAPY | Age: 82
End: 2022-07-27
Payer: MEDICARE

## 2022-07-28 ENCOUNTER — APPOINTMENT (OUTPATIENT)
Dept: PHYSICAL THERAPY | Age: 82
End: 2022-07-28
Payer: MEDICARE

## 2022-08-02 ENCOUNTER — HOSPITAL ENCOUNTER (EMERGENCY)
Age: 82
Discharge: HOME | End: 2022-08-02
Attending: EMERGENCY MEDICINE
Payer: MEDICARE

## 2022-08-02 VITALS
RESPIRATION RATE: 18 BRPM | SYSTOLIC BLOOD PRESSURE: 146 MMHG | TEMPERATURE: 97.1 F | HEIGHT: 64 IN | BODY MASS INDEX: 31.07 KG/M2 | WEIGHT: 182 LBS | HEART RATE: 51 BPM | DIASTOLIC BLOOD PRESSURE: 57 MMHG | OXYGEN SATURATION: 96 %

## 2022-08-02 DIAGNOSIS — H92.02 OTALGIA OF LEFT EAR: Primary | ICD-10-CM

## 2022-08-02 DIAGNOSIS — J01.90 ACUTE SINUSITIS, RECURRENCE NOT SPECIFIED, UNSPECIFIED LOCATION: ICD-10-CM

## 2022-08-02 DIAGNOSIS — I10 HYPERTENSION, UNSPECIFIED TYPE: ICD-10-CM

## 2022-08-02 PROCEDURE — 99283 EMERGENCY DEPT VISIT LOW MDM: CPT

## 2022-08-02 PROCEDURE — 74011250637 HC RX REV CODE- 250/637: Performed by: EMERGENCY MEDICINE

## 2022-08-02 RX ORDER — LORATADINE 10 MG/1
10 TABLET ORAL DAILY
Qty: 10 TABLET | Refills: 0 | Status: SHIPPED | OUTPATIENT
Start: 2022-08-02 | End: 2022-08-12

## 2022-08-02 RX ORDER — NEOMYCIN SULFATE, POLYMYXIN B SULFATE AND HYDROCORTISONE 10; 3.5; 1 MG/ML; MG/ML; [USP'U]/ML
3 SUSPENSION/ DROPS AURICULAR (OTIC) 4 TIMES DAILY
Qty: 5 ML | Refills: 0 | Status: SHIPPED | OUTPATIENT
Start: 2022-08-02 | End: 2022-08-09

## 2022-08-02 RX ORDER — ACETAMINOPHEN 325 MG/1
650 TABLET ORAL
Status: COMPLETED | OUTPATIENT
Start: 2022-08-02 | End: 2022-08-02

## 2022-08-02 RX ORDER — CLONIDINE HYDROCHLORIDE 0.1 MG/1
0.1 TABLET ORAL
Status: COMPLETED | OUTPATIENT
Start: 2022-08-02 | End: 2022-08-02

## 2022-08-02 RX ORDER — CLONIDINE HYDROCHLORIDE 0.1 MG/1
0.1 TABLET ORAL
Status: DISCONTINUED | OUTPATIENT
Start: 2022-08-02 | End: 2022-08-02 | Stop reason: CLARIF

## 2022-08-02 RX ORDER — VALACYCLOVIR HYDROCHLORIDE 500 MG/1
TABLET, FILM COATED ORAL
COMMUNITY
Start: 2022-08-01

## 2022-08-02 RX ADMIN — CLONIDINE HYDROCHLORIDE 0.1 MG: 0.1 TABLET ORAL at 10:32

## 2022-08-02 RX ADMIN — ACETAMINOPHEN 650 MG: 325 TABLET ORAL at 10:32

## 2022-08-02 NOTE — ED PROVIDER NOTES
EMERGENCY DEPARTMENT HISTORY AND PHYSICAL EXAM      Date: 8/2/2022  Patient Name: Lakshmi Sanchez    History of Presenting Illness     Chief Complaint   Patient presents with    Ear Pain       History Provided By: Patient    HPI: Brittanie Gee, 78 y.o. female with a significant past medical history of HTN, DVT, [place] presents to the ED with Sinus pain 3 weeks not resolved by Cefuroxime, left ear pain which started yesterday. Made it difficult to sleep through the night. She pushed in a Q tip and also used her hearing aid with some pain. She applied Olive oil but it did not help. She spoke with a Tele doctor last night who ordered Z Pack for her. She denies fever, chills headache. There are no other complaints, changes, or physical findings at this time. PCP: Meka Hart NP    No current facility-administered medications on file prior to encounter. Current Outpatient Medications on File Prior to Encounter   Medication Sig Dispense Refill    valACYclovir (VALTREX) 500 mg tablet       simvastatin (ZOCOR) 10 mg tablet TAKE 1 TABLET DAILY 90 Tablet 3    esomeprazole (NexIUM) 20 mg capsule Take 1 Capsule by mouth daily. 90 Capsule 3    furosemide (LASIX) 20 mg tablet Take 1 tablet daily as needed. 20 Tablet 0    lisinopriL (PRINIVIL, ZESTRIL) 20 mg tablet Take 0.5 Tablets by mouth daily. 90 Tablet 3    Eliquis 5 mg tablet Take 5 mg by mouth two (2) times a day. LORazepam (ATIVAN) 1 mg tablet Take 1 Tab by mouth daily as needed for Anxiety. 30 Tab 2    amLODIPine (NORVASC) 2.5 mg tablet TAKE 1 TABLET TWICE A  Tab 3    bisoprolol (ZEBETA) 5 mg tablet TAKE 1 TABLET TWICE A  Tab 3    apremilast (OTEZLA) 30 mg tab Take 30 mg by mouth daily. turmeric root extract 500 mg cap Take 500 mg by mouth.      cyanocobalamin, vitamin B-12, (VITAMIN B-12 PO) Take 1,000 mcg by mouth.      cranberry fruit extract (CRANBERRY PO) Take  by mouth.       CALCIUM CARB/VIT D3/MINERALS (CALCIUM-VITAMIN D PO) Take  by mouth. DOCOSAHEXANOIC ACID/EPA (FISH OIL PO) Take  by mouth. Past History     Past Medical History:  Past Medical History:   Diagnosis Date    Acute deep vein thrombosis (DVT) of distal vein of right lower extremity ([address] 6/16/2020    Baker's cyst of knee     Left    Easy bruising     Essential hypertension 6/16/2020    Hypercholesteremia     Hypertension     Left arm pain     Neck pain     Osteoarthritis of both knees     Rapid pulse     Right elbow pain     Right hand pain     Right lateral epicondylitis     Swelling of joint of left wrist     Thoracic kyphosis     Cervical muscle stiffness    Wears glasses        Past Surgical History:  History reviewed. No pertinent surgical history. Family History:  Family History   Problem Relation Age of Onset    Heart Disease Son        Social History:  Social History     Tobacco Use    Smoking status: Never    Smokeless tobacco: Never   Substance Use Topics    Alcohol use: No    Drug use: Never       Allergies: Allergies   Allergen Reactions    Epinephrine Other (comments) and Palpitations     Fast heartbeat    Morphine Not Reported This Time    Procaine Other (comments) and Hives     Fast heartbeat    Shellfish Derived Diarrhea and Nausea and Vomiting       Review of Systems   Review of Systems   Constitutional: Negative. HENT:  Positive for ear pain. Respiratory: Negative. Cardiovascular: Negative. Gastrointestinal: Negative. Genitourinary: Negative. Musculoskeletal: Negative. Neurological: Negative. All other systems reviewed and are negative. Physical Exam   Physical Exam  Constitutional:       Appearance: Normal appearance. HENT:      Head: Normocephalic. Right Ear: Tympanic membrane normal.      Ears:      Comments: Bruise with bloody ecchymoses left deep acoustic canal close to TM  Eyes:      Extraocular Movements: Extraocular movements intact.       Pupils: Pupils are equal, round, and reactive to light. Cardiovascular:      Rate and Rhythm: Normal rate and regular rhythm. Pulmonary:      Effort: Pulmonary effort is normal.      Breath sounds: Normal breath sounds. Abdominal:      Palpations: Abdomen is soft. Tenderness: There is no abdominal tenderness. Musculoskeletal:         General: Normal range of motion. Cervical back: Normal range of motion and neck supple. Neurological:      General: No focal deficit present. Mental Status: She is alert and oriented to person, place, and time. Lab and Diagnostic Study Results   Labs -   No results found for this or any previous visit (from the past 12 hour(s)). Radiologic Studies -   @lastxrresult@  CT Results  (Last 48 hours)      None          CXR Results  (Last 48 hours)      None            Medical Decision Making and ED Course   - I am the first provider for this patient. I reviewed the vital signs, available nursing notes, past medical history, past surgical history, family history and social history. - Initial assessment performed. The patients presenting problems have been discussed, and they are in agreement with the care plan formulated and outlined with them. I have encouraged them to ask questions as they arise throughout their visit. Vital Signs-Reviewed the patient's vital signs. Patient Vitals for the past 12 hrs:   Temp Pulse Resp BP SpO2   08/02/22 1145 -- (!) 51 18 (!) 146/57 96 %   08/02/22 1115 -- (!) 55 18 (!) 176/67 98 %   08/02/22 1032 -- 70 -- (!) 204/90 --   08/02/22 0941 97.1 °F (36.2 °C) 66 18 (!) 238/92 98 %       Differential Diagnosis & Medical Decision Making Provider Note:     [facility]       ED Course:        Procedures   Performed by: Vivian Alonzo MD  Procedures      Disposition   Disposition: DC- Adult Discharges: All of the diagnostic tests were reviewed and questions answered. Diagnosis, care plan and treatment options were discussed.   The patient understands the instructions and will follow up as directed. The patients results have been reviewed with them. They have been counseled regarding their diagnosis. The patient verbally convey understanding and agreement of the signs, symptoms, diagnosis, treatment and prognosis and additionally agrees to follow up as recommended with their PCP in 24 - 48 hours. They also agree with the care-plan and convey that all of their questions have been answered. I have also put together some discharge instructions for them that include: 1) educational information regarding their diagnosis, 2) how to care for their diagnosis at home, as well a 3) list of reasons why they would want to return to the ED prior to their follow-up appointment, should their condition change. Discharged    DISCHARGE PLAN:  1. Current Discharge Medication List        CONTINUE these medications which have NOT CHANGED    Details   valACYclovir (VALTREX) 500 mg tablet       simvastatin (ZOCOR) 10 mg tablet TAKE 1 TABLET DAILY  Qty: 90 Tablet, Refills: 3    Associated Diagnoses: Familial hypercholesterolemia      esomeprazole (NexIUM) 20 mg capsule Take 1 Capsule by mouth daily. Qty: 90 Capsule, Refills: 3    Associated Diagnoses: Gastroesophageal reflux disease without esophagitis      furosemide (LASIX) 20 mg tablet Take 1 tablet daily as needed. Qty: 20 Tablet, Refills: 0    Associated Diagnoses: Bilateral edema of lower extremity      lisinopriL (PRINIVIL, ZESTRIL) 20 mg tablet Take 0.5 Tablets by mouth daily. Qty: 90 Tablet, Refills: 3    Associated Diagnoses: Essential hypertension      Eliquis 5 mg tablet Take 5 mg by mouth two (2) times a day. LORazepam (ATIVAN) 1 mg tablet Take 1 Tab by mouth daily as needed for Anxiety.   Qty: 30 Tab, Refills: 2    Associated Diagnoses: Anxiety      amLODIPine (NORVASC) 2.5 mg tablet TAKE 1 TABLET TWICE A DAY  Qty: 180 Tab, Refills: 3      bisoprolol (ZEBETA) 5 mg tablet TAKE 1 TABLET TWICE A DAY  Qty: 180 Tab, Refills: 3 apremilast (OTEZLA) 30 mg tab Take 30 mg by mouth daily. turmeric root extract 500 mg cap Take 500 mg by mouth.      cyanocobalamin, vitamin B-12, (VITAMIN B-12 PO) Take 1,000 mcg by mouth.      cranberry fruit extract (CRANBERRY PO) Take  by mouth. CALCIUM CARB/VIT D3/MINERALS (CALCIUM-VITAMIN D PO) Take  by mouth. DOCOSAHEXANOIC ACID/EPA (FISH OIL PO) Take  by mouth. 2.   Follow-up Information       Follow up With Specialties Details Why Contact Jim    Lilia Monday, NP Nurse Practitioner In 2 days  [address]  [facility]  [phone]            3. Return to ED if worse   4. Current Discharge Medication List        START taking these medications    Details   loratadine (Claritin) 10 mg tablet Take 1 Tablet by mouth in the morning for 10 days. Qty: 10 Tablet, Refills: 0  Start date: 8/2/2022, End date: 8/12/2022      neomycin-polymyxin-hydrocortisone, buffered, (PEDIOTIC) 3.5-10,000-1 mg/mL-unit/mL-% otic suspension Administer 3 Drops in left ear four (4) times daily for 7 days. Qty: 5 mL, Refills: 0  Start date: 8/2/2022, End date: 8/9/2022            Remove if admitted/transferred    Diagnosis/Clinical Impression     Clinical Impression:   1. Otalgia of left ear    2. Hypertension, unspecified type    3. Acute sinusitis, recurrence not specified, unspecified location        Attestations: Nilsa FAITH MD, am the primary clinician of record. Please note that this dictation was completed with [employer], the [employer] voice recognition software. Quite often unanticipated grammatical, syntax, homophones, and other interpretive errors are inadvertently transcribed by the computer software. Please disregard these errors. Please excuse any errors that have escaped final proofreading. Thank you.

## 2022-08-02 NOTE — ED TRIAGE NOTES
Left ear pain that started last night. Hurts when she presses on it. Has taken ibuprofen and tylenol with little relief.

## 2022-08-10 ENCOUNTER — TRANSCRIBE ORDER (OUTPATIENT)
Dept: SCHEDULING | Age: 82
End: 2022-08-10

## 2022-08-10 DIAGNOSIS — J32.2 CHRONIC ETHMOIDAL SINUSITIS: Primary | ICD-10-CM

## 2022-08-16 ENCOUNTER — HOSPITAL ENCOUNTER (OUTPATIENT)
Dept: CT IMAGING | Age: 82
Discharge: HOME | End: 2022-08-16
Attending: PHYSICIAN ASSISTANT
Payer: MEDICARE

## 2022-08-16 DIAGNOSIS — J32.2 CHRONIC ETHMOIDAL SINUSITIS: ICD-10-CM

## 2022-08-16 PROCEDURE — 70486 CT MAXILLOFACIAL W/O DYE: CPT

## 2023-07-08 ENCOUNTER — HOSPITAL ENCOUNTER (INPATIENT)
Facility: HOSPITAL | Age: 83
LOS: 5 days | Discharge: HOME | DRG: 641 | End: 2023-07-13
Attending: EMERGENCY MEDICINE | Admitting: INTERNAL MEDICINE
Payer: MEDICARE

## 2023-07-08 DIAGNOSIS — E87.1 HYPONATREMIA: Primary | ICD-10-CM

## 2023-07-08 DIAGNOSIS — R11.2 NAUSEA AND VOMITING, UNSPECIFIED VOMITING TYPE: ICD-10-CM

## 2023-07-08 LAB
ALBUMIN SERPL-MCNC: 3.5 G/DL (ref 3.5–5)
ALBUMIN/GLOB SERPL: 1.1 (ref 1.1–2.2)
ALP SERPL-CCNC: 60 U/L (ref 45–117)
ALT SERPL-CCNC: 28 U/L (ref 12–78)
ANION GAP SERPL CALC-SCNC: 8 MMOL/L (ref 5–15)
ANION GAP SERPL CALC-SCNC: 9 MMOL/L (ref 5–15)
APPEARANCE UR: CLEAR
AST SERPL-CCNC: 35 U/L (ref 15–37)
BACTERIA URNS QL MICRO: ABNORMAL /HPF
BASOPHILS # BLD: 0 K/UL (ref 0–0.1)
BASOPHILS NFR BLD: 0 % (ref 0–1)
BILIRUB SERPL-MCNC: 0.6 MG/DL (ref 0.2–1)
BILIRUB UR QL: NEGATIVE
BUN SERPL-MCNC: 15 MG/DL (ref 6–20)
BUN SERPL-MCNC: 16 MG/DL (ref 6–20)
BUN/CREAT SERPL: 12 (ref 12–20)
BUN/CREAT SERPL: 13 (ref 12–20)
CALCIUM SERPL-MCNC: 8.2 MG/DL (ref 8.5–10.1)
CALCIUM SERPL-MCNC: 8.5 MG/DL (ref 8.5–10.1)
CHLORIDE SERPL-SCNC: 83 MMOL/L (ref 97–108)
CHLORIDE SERPL-SCNC: 87 MMOL/L (ref 97–108)
CO2 SERPL-SCNC: 22 MMOL/L (ref 21–32)
CO2 SERPL-SCNC: 23 MMOL/L (ref 21–32)
COLOR UR: ABNORMAL
COMMENT:: NORMAL
COMMENT:: NORMAL
CREAT SERPL-MCNC: 1.22 MG/DL (ref 0.55–1.02)
CREAT SERPL-MCNC: 1.23 MG/DL (ref 0.55–1.02)
CREAT UR-MCNC: <13 MG/DL
DIFFERENTIAL METHOD BLD: ABNORMAL
EOSINOPHIL # BLD: 0 K/UL (ref 0–0.4)
EOSINOPHIL NFR BLD: 0 % (ref 0–7)
EPITH CASTS URNS QL MICRO: ABNORMAL /LPF
ERYTHROCYTE [DISTWIDTH] IN BLOOD BY AUTOMATED COUNT: 13.2 % (ref 11.5–14.5)
GLOBULIN SER CALC-MCNC: 3.1 G/DL (ref 2–4)
GLUCOSE SERPL-MCNC: 115 MG/DL (ref 65–100)
GLUCOSE SERPL-MCNC: 122 MG/DL (ref 65–100)
GLUCOSE UR STRIP.AUTO-MCNC: NEGATIVE MG/DL
HCT VFR BLD AUTO: 32.2 % (ref 35–47)
HGB BLD-MCNC: 11.5 G/DL (ref 11.5–16)
HGB UR QL STRIP: NEGATIVE
HYALINE CASTS URNS QL MICRO: ABNORMAL /LPF (ref 0–2)
IMM GRANULOCYTES # BLD AUTO: 0 K/UL (ref 0–0.04)
IMM GRANULOCYTES NFR BLD AUTO: 0 % (ref 0–0.5)
KETONES UR QL STRIP.AUTO: 15 MG/DL
LEUKOCYTE ESTERASE UR QL STRIP.AUTO: ABNORMAL
LIPASE SERPL-CCNC: 202 U/L (ref 73–393)
LYMPHOCYTES # BLD: 0.4 K/UL (ref 0.8–3.5)
LYMPHOCYTES NFR BLD: 17 % (ref 12–49)
MCH RBC QN AUTO: 34.3 PG (ref 26–34)
MCHC RBC AUTO-ENTMCNC: 35.7 G/DL (ref 30–36.5)
MCV RBC AUTO: 96.1 FL (ref 80–99)
METAMYELOCYTES NFR BLD MANUAL: 2 %
MONOCYTES # BLD: 0.3 K/UL (ref 0–1)
MONOCYTES NFR BLD: 12 % (ref 5–13)
MYELOCYTES NFR BLD MANUAL: 1 %
NEUTS BAND NFR BLD MANUAL: 7 %
NEUTS SEG # BLD: 1.7 K/UL (ref 1.8–8)
NEUTS SEG NFR BLD: 61 % (ref 32–75)
NITRITE UR QL STRIP.AUTO: NEGATIVE
NRBC # BLD: 0 K/UL (ref 0–0.01)
NRBC BLD-RTO: 0 PER 100 WBC
OSMOLALITY SERPL: 255 MOSM/KG H2O
OSMOLALITY UR: 116 MOSM/KG H2O
PH UR STRIP: 6.5 (ref 5–8)
PLATELET # BLD AUTO: 98 K/UL (ref 150–400)
POTASSIUM SERPL-SCNC: 4.2 MMOL/L (ref 3.5–5.1)
POTASSIUM SERPL-SCNC: 4.9 MMOL/L (ref 3.5–5.1)
POTASSIUM UR-SCNC: 5 MMOL/L
PROT SERPL-MCNC: 6.6 G/DL (ref 6.4–8.2)
PROT UR STRIP-MCNC: NEGATIVE MG/DL
RBC # BLD AUTO: 3.35 M/UL (ref 3.8–5.2)
RBC #/AREA URNS HPF: ABNORMAL /HPF (ref 0–5)
RBC MORPH BLD: ABNORMAL
SARS-COV-2 RDRP RESP QL NAA+PROBE: NOT DETECTED
SODIUM SERPL-SCNC: 114 MMOL/L (ref 136–145)
SODIUM SERPL-SCNC: 118 MMOL/L (ref 136–145)
SODIUM SERPL-SCNC: 123 MMOL/L (ref 136–145)
SODIUM UR-SCNC: 34 MMOL/L
SOURCE: NORMAL
SP GR UR REFRACTOMETRY: 1.01 (ref 1–1.03)
SPECIMEN HOLD: NORMAL
SPECIMEN HOLD: NORMAL
URATE SERPL-MCNC: 5.2 MG/DL (ref 2.6–6)
URINE CULTURE IF INDICATED: ABNORMAL
UROBILINOGEN UR QL STRIP.AUTO: 0.2 EU/DL (ref 0.2–1)
UUN UR-MCNC: 107 MG/DL
WBC # BLD AUTO: 2.5 K/UL (ref 3.6–11)
WBC URNS QL MICRO: ABNORMAL /HPF (ref 0–4)

## 2023-07-08 PROCEDURE — 84550 ASSAY OF BLOOD/URIC ACID: CPT

## 2023-07-08 PROCEDURE — 2580000003 HC RX 258: Performed by: EMERGENCY MEDICINE

## 2023-07-08 PROCEDURE — 80053 COMPREHEN METABOLIC PANEL: CPT

## 2023-07-08 PROCEDURE — 94761 N-INVAS EAR/PLS OXIMETRY MLT: CPT

## 2023-07-08 PROCEDURE — 81001 URINALYSIS AUTO W/SCOPE: CPT

## 2023-07-08 PROCEDURE — 83690 ASSAY OF LIPASE: CPT

## 2023-07-08 PROCEDURE — 99285 EMERGENCY DEPT VISIT HI MDM: CPT

## 2023-07-08 PROCEDURE — 2580000003 HC RX 258: Performed by: INTERNAL MEDICINE

## 2023-07-08 PROCEDURE — 84133 ASSAY OF URINE POTASSIUM: CPT

## 2023-07-08 PROCEDURE — 84300 ASSAY OF URINE SODIUM: CPT

## 2023-07-08 PROCEDURE — 36415 COLL VENOUS BLD VENIPUNCTURE: CPT

## 2023-07-08 PROCEDURE — 2000000000 HC ICU R&B

## 2023-07-08 PROCEDURE — 83930 ASSAY OF BLOOD OSMOLALITY: CPT

## 2023-07-08 PROCEDURE — 6360000002 HC RX W HCPCS: Performed by: EMERGENCY MEDICINE

## 2023-07-08 PROCEDURE — 85025 COMPLETE CBC W/AUTO DIFF WBC: CPT

## 2023-07-08 PROCEDURE — A4216 STERILE WATER/SALINE, 10 ML: HCPCS | Performed by: INTERNAL MEDICINE

## 2023-07-08 PROCEDURE — 6370000000 HC RX 637 (ALT 250 FOR IP): Performed by: INTERNAL MEDICINE

## 2023-07-08 PROCEDURE — 84295 ASSAY OF SERUM SODIUM: CPT

## 2023-07-08 PROCEDURE — 82570 ASSAY OF URINE CREATININE: CPT

## 2023-07-08 PROCEDURE — 84540 ASSAY OF URINE/UREA-N: CPT

## 2023-07-08 PROCEDURE — 87086 URINE CULTURE/COLONY COUNT: CPT

## 2023-07-08 PROCEDURE — 87635 SARS-COV-2 COVID-19 AMP PRB: CPT

## 2023-07-08 PROCEDURE — 2500000003 HC RX 250 WO HCPCS: Performed by: INTERNAL MEDICINE

## 2023-07-08 PROCEDURE — 96374 THER/PROPH/DIAG INJ IV PUSH: CPT

## 2023-07-08 PROCEDURE — 6360000002 HC RX W HCPCS: Performed by: INTERNAL MEDICINE

## 2023-07-08 PROCEDURE — 83935 ASSAY OF URINE OSMOLALITY: CPT

## 2023-07-08 RX ORDER — METOPROLOL SUCCINATE 50 MG/1
50 TABLET, EXTENDED RELEASE ORAL DAILY
Status: DISCONTINUED | OUTPATIENT
Start: 2023-07-08 | End: 2023-07-10

## 2023-07-08 RX ORDER — DESMOPRESSIN ACETATE 4 UG/ML
2 INJECTION, SOLUTION INTRAVENOUS; SUBCUTANEOUS ONCE
Status: COMPLETED | OUTPATIENT
Start: 2023-07-08 | End: 2023-07-08

## 2023-07-08 RX ORDER — SODIUM CHLORIDE 0.9 % (FLUSH) 0.9 %
5-40 SYRINGE (ML) INJECTION EVERY 12 HOURS SCHEDULED
Status: DISCONTINUED | OUTPATIENT
Start: 2023-07-08 | End: 2023-07-13 | Stop reason: HOSPADM

## 2023-07-08 RX ORDER — ACETAMINOPHEN 650 MG/1
650 SUPPOSITORY RECTAL EVERY 6 HOURS PRN
Status: DISCONTINUED | OUTPATIENT
Start: 2023-07-08 | End: 2023-07-13 | Stop reason: HOSPADM

## 2023-07-08 RX ORDER — 0.9 % SODIUM CHLORIDE 0.9 %
1000 INTRAVENOUS SOLUTION INTRAVENOUS ONCE
Status: DISCONTINUED | OUTPATIENT
Start: 2023-07-08 | End: 2023-07-08

## 2023-07-08 RX ORDER — HYDRALAZINE HYDROCHLORIDE 20 MG/ML
20 INJECTION INTRAMUSCULAR; INTRAVENOUS EVERY 6 HOURS PRN
Status: DISCONTINUED | OUTPATIENT
Start: 2023-07-08 | End: 2023-07-13 | Stop reason: HOSPADM

## 2023-07-08 RX ORDER — SODIUM CHLORIDE 9 MG/ML
INJECTION, SOLUTION INTRAVENOUS PRN
Status: DISCONTINUED | OUTPATIENT
Start: 2023-07-08 | End: 2023-07-13 | Stop reason: HOSPADM

## 2023-07-08 RX ORDER — LORAZEPAM 1 MG/1
1 TABLET ORAL DAILY PRN
Status: DISCONTINUED | OUTPATIENT
Start: 2023-07-08 | End: 2023-07-13 | Stop reason: HOSPADM

## 2023-07-08 RX ORDER — ONDANSETRON 2 MG/ML
4 INJECTION INTRAMUSCULAR; INTRAVENOUS EVERY 6 HOURS PRN
Status: DISCONTINUED | OUTPATIENT
Start: 2023-07-08 | End: 2023-07-13 | Stop reason: HOSPADM

## 2023-07-08 RX ORDER — AMLODIPINE BESYLATE 5 MG/1
2.5 TABLET ORAL 2 TIMES DAILY
Status: DISCONTINUED | OUTPATIENT
Start: 2023-07-08 | End: 2023-07-08

## 2023-07-08 RX ORDER — 0.9 % SODIUM CHLORIDE 0.9 %
500 INTRAVENOUS SOLUTION INTRAVENOUS ONCE
Status: DISCONTINUED | OUTPATIENT
Start: 2023-07-08 | End: 2023-07-08

## 2023-07-08 RX ORDER — DEXTROSE MONOHYDRATE 50 MG/ML
INJECTION, SOLUTION INTRAVENOUS CONTINUOUS
Status: DISPENSED | OUTPATIENT
Start: 2023-07-08 | End: 2023-07-08

## 2023-07-08 RX ORDER — ENOXAPARIN SODIUM 100 MG/ML
40 INJECTION SUBCUTANEOUS DAILY
Status: DISCONTINUED | OUTPATIENT
Start: 2023-07-08 | End: 2023-07-08 | Stop reason: SDUPTHER

## 2023-07-08 RX ORDER — SPIRONOLACTONE 50 MG/1
50 TABLET, FILM COATED ORAL DAILY
Status: ON HOLD | COMMUNITY
End: 2023-07-13 | Stop reason: SDUPTHER

## 2023-07-08 RX ORDER — ACETAMINOPHEN 325 MG/1
650 TABLET ORAL EVERY 6 HOURS PRN
Status: DISCONTINUED | OUTPATIENT
Start: 2023-07-08 | End: 2023-07-13 | Stop reason: HOSPADM

## 2023-07-08 RX ORDER — ONDANSETRON 4 MG/1
4 TABLET, ORALLY DISINTEGRATING ORAL EVERY 8 HOURS PRN
Status: DISCONTINUED | OUTPATIENT
Start: 2023-07-08 | End: 2023-07-13 | Stop reason: HOSPADM

## 2023-07-08 RX ORDER — POLYETHYLENE GLYCOL 3350 17 G/17G
17 POWDER, FOR SOLUTION ORAL DAILY PRN
Status: DISCONTINUED | OUTPATIENT
Start: 2023-07-08 | End: 2023-07-13 | Stop reason: HOSPADM

## 2023-07-08 RX ORDER — PANTOPRAZOLE SODIUM 40 MG/1
40 TABLET, DELAYED RELEASE ORAL DAILY
Status: DISCONTINUED | OUTPATIENT
Start: 2023-07-08 | End: 2023-07-09

## 2023-07-08 RX ORDER — SODIUM CHLORIDE 0.9 % (FLUSH) 0.9 %
5-40 SYRINGE (ML) INJECTION PRN
Status: DISCONTINUED | OUTPATIENT
Start: 2023-07-08 | End: 2023-07-13 | Stop reason: HOSPADM

## 2023-07-08 RX ADMIN — SODIUM CHLORIDE 1000 ML: 9 INJECTION, SOLUTION INTRAVENOUS at 07:55

## 2023-07-08 RX ADMIN — SODIUM CHLORIDE, PRESERVATIVE FREE 10 ML: 5 INJECTION INTRAVENOUS at 12:02

## 2023-07-08 RX ADMIN — SODIUM CHLORIDE, PRESERVATIVE FREE 10 ML: 5 INJECTION INTRAVENOUS at 20:15

## 2023-07-08 RX ADMIN — FAMOTIDINE 20 MG: 10 INJECTION, SOLUTION INTRAVENOUS at 12:02

## 2023-07-08 RX ADMIN — APIXABAN 5 MG: 5 TABLET, FILM COATED ORAL at 17:34

## 2023-07-08 RX ADMIN — DESMOPRESSIN ACETATE 2 MCG: 4 SOLUTION INTRAVENOUS at 20:15

## 2023-07-08 RX ADMIN — DEXTROSE MONOHYDRATE 250 ML: 50 INJECTION, SOLUTION INTRAVENOUS at 20:05

## 2023-07-08 RX ADMIN — PANTOPRAZOLE SODIUM 40 MG: 40 TABLET, DELAYED RELEASE ORAL at 17:34

## 2023-07-08 RX ADMIN — METOPROLOL SUCCINATE 50 MG: 50 TABLET, EXTENDED RELEASE ORAL at 12:02

## 2023-07-08 RX ADMIN — ONDANSETRON 4 MG: 2 INJECTION INTRAMUSCULAR; INTRAVENOUS at 07:54

## 2023-07-08 ASSESSMENT — ENCOUNTER SYMPTOMS
RESPIRATORY NEGATIVE: 1
DIARRHEA: 1
NAUSEA: 1
EYES NEGATIVE: 1
VOMITING: 1

## 2023-07-08 ASSESSMENT — PAIN SCALES - GENERAL: PAINLEVEL_OUTOF10: 0

## 2023-07-08 ASSESSMENT — PAIN - FUNCTIONAL ASSESSMENT: PAIN_FUNCTIONAL_ASSESSMENT: NONE - DENIES PAIN

## 2023-07-09 LAB
ANION GAP SERPL CALC-SCNC: 2 MMOL/L (ref 5–15)
BACTERIA SPEC CULT: NORMAL
BASOPHILS # BLD: 0 K/UL (ref 0–0.1)
BASOPHILS NFR BLD: 0 % (ref 0–1)
BUN SERPL-MCNC: 12 MG/DL (ref 6–20)
BUN/CREAT SERPL: 9 (ref 12–20)
CALCIUM SERPL-MCNC: 8.2 MG/DL (ref 8.5–10.1)
CC UR VC: NORMAL
CHLORIDE SERPL-SCNC: 93 MMOL/L (ref 97–108)
CO2 SERPL-SCNC: 26 MMOL/L (ref 21–32)
CREAT SERPL-MCNC: 1.27 MG/DL (ref 0.55–1.02)
DIFFERENTIAL METHOD BLD: ABNORMAL
EOSINOPHIL # BLD: 0 K/UL (ref 0–0.4)
EOSINOPHIL NFR BLD: 0 % (ref 0–7)
ERYTHROCYTE [DISTWIDTH] IN BLOOD BY AUTOMATED COUNT: 13.4 % (ref 11.5–14.5)
GLUCOSE SERPL-MCNC: 98 MG/DL (ref 65–100)
HCT VFR BLD AUTO: 29.9 % (ref 35–47)
HGB BLD-MCNC: 10.1 G/DL (ref 11.5–16)
IMM GRANULOCYTES # BLD AUTO: 0 K/UL (ref 0–0.04)
IMM GRANULOCYTES NFR BLD AUTO: 1 % (ref 0–0.5)
LYMPHOCYTES # BLD: 0.5 K/UL (ref 0.8–3.5)
LYMPHOCYTES NFR BLD: 33 % (ref 12–49)
MCH RBC QN AUTO: 33.6 PG (ref 26–34)
MCHC RBC AUTO-ENTMCNC: 33.8 G/DL (ref 30–36.5)
MCV RBC AUTO: 99.3 FL (ref 80–99)
MONOCYTES # BLD: 0.2 K/UL (ref 0–1)
MONOCYTES NFR BLD: 15 % (ref 5–13)
NEUTS SEG # BLD: 0.9 K/UL (ref 1.8–8)
NEUTS SEG NFR BLD: 51 % (ref 32–75)
NRBC # BLD: 0 K/UL (ref 0–0.01)
NRBC BLD-RTO: 0 PER 100 WBC
PLATELET # BLD AUTO: 81 K/UL (ref 150–400)
POTASSIUM SERPL-SCNC: 4.3 MMOL/L (ref 3.5–5.1)
RBC # BLD AUTO: 3.01 M/UL (ref 3.8–5.2)
RBC MORPH BLD: ABNORMAL
SERVICE CMNT-IMP: NORMAL
SODIUM SERPL-SCNC: 118 MMOL/L (ref 136–145)
SODIUM SERPL-SCNC: 121 MMOL/L (ref 136–145)
SODIUM SERPL-SCNC: 124 MMOL/L (ref 136–145)
SODIUM SERPL-SCNC: 125 MMOL/L (ref 136–145)
TROPONIN I SERPL HS-MCNC: 17 NG/L (ref 0–51)
WBC # BLD AUTO: 1.6 K/UL (ref 3.6–11)

## 2023-07-09 PROCEDURE — 80048 BASIC METABOLIC PNL TOTAL CA: CPT

## 2023-07-09 PROCEDURE — 2580000003 HC RX 258: Performed by: NURSE PRACTITIONER

## 2023-07-09 PROCEDURE — 2580000003 HC RX 258: Performed by: INTERNAL MEDICINE

## 2023-07-09 PROCEDURE — 36415 COLL VENOUS BLD VENIPUNCTURE: CPT

## 2023-07-09 PROCEDURE — 94761 N-INVAS EAR/PLS OXIMETRY MLT: CPT

## 2023-07-09 PROCEDURE — 84295 ASSAY OF SERUM SODIUM: CPT

## 2023-07-09 PROCEDURE — 6370000000 HC RX 637 (ALT 250 FOR IP): Performed by: INTERNAL MEDICINE

## 2023-07-09 PROCEDURE — 85025 COMPLETE CBC W/AUTO DIFF WBC: CPT

## 2023-07-09 PROCEDURE — 93005 ELECTROCARDIOGRAM TRACING: CPT | Performed by: INTERNAL MEDICINE

## 2023-07-09 PROCEDURE — 6360000002 HC RX W HCPCS: Performed by: INTERNAL MEDICINE

## 2023-07-09 PROCEDURE — 84484 ASSAY OF TROPONIN QUANT: CPT

## 2023-07-09 PROCEDURE — 2000000000 HC ICU R&B

## 2023-07-09 RX ORDER — DEXTROSE MONOHYDRATE 50 MG/ML
INJECTION, SOLUTION INTRAVENOUS CONTINUOUS
Status: DISCONTINUED | OUTPATIENT
Start: 2023-07-09 | End: 2023-07-09

## 2023-07-09 RX ORDER — 3% SODIUM CHLORIDE 3 G/100ML
25 INJECTION, SOLUTION INTRAVENOUS CONTINUOUS
Status: DISPENSED | OUTPATIENT
Start: 2023-07-09 | End: 2023-07-09

## 2023-07-09 RX ORDER — DESMOPRESSIN ACETATE 4 UG/ML
2 INJECTION, SOLUTION INTRAVENOUS; SUBCUTANEOUS ONCE
Status: COMPLETED | OUTPATIENT
Start: 2023-07-09 | End: 2023-07-09

## 2023-07-09 RX ADMIN — SODIUM CHLORIDE 25 ML/HR: 3 INJECTION, SOLUTION INTRAVENOUS at 19:45

## 2023-07-09 RX ADMIN — DEXTROSE MONOHYDRATE: 50 INJECTION, SOLUTION INTRAVENOUS at 03:25

## 2023-07-09 RX ADMIN — SODIUM CHLORIDE, PRESERVATIVE FREE 10 ML: 5 INJECTION INTRAVENOUS at 22:00

## 2023-07-09 RX ADMIN — APIXABAN 5 MG: 5 TABLET, FILM COATED ORAL at 09:03

## 2023-07-09 RX ADMIN — DESMOPRESSIN ACETATE 2 MCG: 4 SOLUTION INTRAVENOUS at 13:59

## 2023-07-09 RX ADMIN — APIXABAN 5 MG: 5 TABLET, FILM COATED ORAL at 22:00

## 2023-07-09 ASSESSMENT — PAIN SCALES - GENERAL
PAINLEVEL_OUTOF10: 0

## 2023-07-10 LAB
ANION GAP SERPL CALC-SCNC: 6 MMOL/L (ref 5–15)
ANION GAP SERPL CALC-SCNC: 9 MMOL/L (ref 5–15)
BASOPHILS # BLD: 0 K/UL (ref 0–0.1)
BASOPHILS NFR BLD: 0 % (ref 0–1)
BUN SERPL-MCNC: 15 MG/DL (ref 6–20)
BUN SERPL-MCNC: 15 MG/DL (ref 6–20)
BUN/CREAT SERPL: 14 (ref 12–20)
BUN/CREAT SERPL: 15 (ref 12–20)
CALCIUM SERPL-MCNC: 8 MG/DL (ref 8.5–10.1)
CALCIUM SERPL-MCNC: 8.1 MG/DL (ref 8.5–10.1)
CHLORIDE SERPL-SCNC: 91 MMOL/L (ref 97–108)
CHLORIDE SERPL-SCNC: 91 MMOL/L (ref 97–108)
CO2 SERPL-SCNC: 21 MMOL/L (ref 21–32)
CO2 SERPL-SCNC: 22 MMOL/L (ref 21–32)
CREAT SERPL-MCNC: 1.02 MG/DL (ref 0.55–1.02)
CREAT SERPL-MCNC: 1.04 MG/DL (ref 0.55–1.02)
DIFFERENTIAL METHOD BLD: ABNORMAL
EKG ATRIAL RATE: 64 BPM
EKG DIAGNOSIS: NORMAL
EKG P AXIS: 50 DEGREES
EKG P-R INTERVAL: 290 MS
EKG Q-T INTERVAL: 384 MS
EKG QRS DURATION: 80 MS
EKG QTC CALCULATION (BAZETT): 396 MS
EKG R AXIS: 41 DEGREES
EKG T AXIS: 40 DEGREES
EKG VENTRICULAR RATE: 64 BPM
EOSINOPHIL # BLD: 0 K/UL (ref 0–0.4)
EOSINOPHIL NFR BLD: 0 % (ref 0–7)
ERYTHROCYTE [DISTWIDTH] IN BLOOD BY AUTOMATED COUNT: 13.6 % (ref 11.5–14.5)
FERRITIN SERPL-MCNC: 410 NG/ML (ref 8–252)
FOLATE SERPL-MCNC: 18.6 NG/ML (ref 5–21)
GLUCOSE SERPL-MCNC: 93 MG/DL (ref 65–100)
GLUCOSE SERPL-MCNC: 96 MG/DL (ref 65–100)
HBV SURFACE AB SER QL: REACTIVE
HBV SURFACE AB SER-ACNC: 39.88 MIU/ML
HBV SURFACE AG SER QL: <0.1 INDEX
HBV SURFACE AG SER QL: NEGATIVE
HCT VFR BLD AUTO: 29.4 % (ref 35–47)
HCV AB SERPL QL IA: NONREACTIVE
HGB BLD-MCNC: 10.1 G/DL (ref 11.5–16)
IMM GRANULOCYTES # BLD AUTO: 0 K/UL (ref 0–0.04)
IMM GRANULOCYTES NFR BLD AUTO: 0 % (ref 0–0.5)
IRON SATN MFR SERPL: 17 % (ref 20–50)
IRON SERPL-MCNC: 54 UG/DL (ref 35–150)
LYMPHOCYTES # BLD: 0.4 K/UL (ref 0.8–3.5)
LYMPHOCYTES NFR BLD: 37 % (ref 12–49)
MCH RBC QN AUTO: 33.9 PG (ref 26–34)
MCHC RBC AUTO-ENTMCNC: 34.4 G/DL (ref 30–36.5)
MCV RBC AUTO: 98.7 FL (ref 80–99)
METAMYELOCYTES NFR BLD MANUAL: 1 %
MONOCYTES # BLD: 0.2 K/UL (ref 0–1)
MONOCYTES NFR BLD: 15 % (ref 5–13)
NEUTS BAND NFR BLD MANUAL: 2 %
NEUTS SEG # BLD: 0.6 K/UL (ref 1.8–8)
NEUTS SEG NFR BLD: 45 % (ref 32–75)
NRBC # BLD: 0 K/UL (ref 0–0.01)
NRBC BLD-RTO: 0 PER 100 WBC
OSMOLALITY UR: 640 MOSM/KG H2O
PERIPHERAL SMEAR, MD REVIEW: NORMAL
PLATELET # BLD AUTO: 67 K/UL (ref 150–400)
PMV BLD AUTO: 13 FL (ref 8.9–12.9)
POTASSIUM SERPL-SCNC: 4.2 MMOL/L (ref 3.5–5.1)
POTASSIUM SERPL-SCNC: 4.7 MMOL/L (ref 3.5–5.1)
RBC # BLD AUTO: 2.98 M/UL (ref 3.8–5.2)
RBC MORPH BLD: ABNORMAL
RETICS # AUTO: 0.02 M/UL (ref 0.02–0.08)
RETICS/RBC NFR AUTO: 0.7 % (ref 0.7–2.1)
SODIUM SERPL-SCNC: 118 MMOL/L (ref 136–145)
SODIUM SERPL-SCNC: 119 MMOL/L (ref 136–145)
SODIUM SERPL-SCNC: 121 MMOL/L (ref 136–145)
SODIUM SERPL-SCNC: 133 MMOL/L (ref 136–145)
TIBC SERPL-MCNC: 312 UG/DL (ref 250–450)
TSH SERPL DL<=0.05 MIU/L-ACNC: 1.94 UIU/ML (ref 0.36–3.74)
VIT B12 SERPL-MCNC: >2000 PG/ML (ref 193–986)
WBC # BLD AUTO: 1.2 K/UL (ref 3.6–11)

## 2023-07-10 PROCEDURE — 83935 ASSAY OF URINE OSMOLALITY: CPT

## 2023-07-10 PROCEDURE — 82607 VITAMIN B-12: CPT

## 2023-07-10 PROCEDURE — 85025 COMPLETE CBC W/AUTO DIFF WBC: CPT

## 2023-07-10 PROCEDURE — 86704 HEP B CORE ANTIBODY TOTAL: CPT

## 2023-07-10 PROCEDURE — 2580000003 HC RX 258: Performed by: INTERNAL MEDICINE

## 2023-07-10 PROCEDURE — 86803 HEPATITIS C AB TEST: CPT

## 2023-07-10 PROCEDURE — 93005 ELECTROCARDIOGRAM TRACING: CPT | Performed by: INTERNAL MEDICINE

## 2023-07-10 PROCEDURE — 94761 N-INVAS EAR/PLS OXIMETRY MLT: CPT

## 2023-07-10 PROCEDURE — 80048 BASIC METABOLIC PNL TOTAL CA: CPT

## 2023-07-10 PROCEDURE — 87340 HEPATITIS B SURFACE AG IA: CPT

## 2023-07-10 PROCEDURE — 84295 ASSAY OF SERUM SODIUM: CPT

## 2023-07-10 PROCEDURE — 82746 ASSAY OF FOLIC ACID SERUM: CPT

## 2023-07-10 PROCEDURE — 83550 IRON BINDING TEST: CPT

## 2023-07-10 PROCEDURE — 99223 1ST HOSP IP/OBS HIGH 75: CPT | Performed by: INTERNAL MEDICINE

## 2023-07-10 PROCEDURE — 82784 ASSAY IGA/IGD/IGG/IGM EACH: CPT

## 2023-07-10 PROCEDURE — 86706 HEP B SURFACE ANTIBODY: CPT

## 2023-07-10 PROCEDURE — 86334 IMMUNOFIX E-PHORESIS SERUM: CPT

## 2023-07-10 PROCEDURE — 36415 COLL VENOUS BLD VENIPUNCTURE: CPT

## 2023-07-10 PROCEDURE — 6370000000 HC RX 637 (ALT 250 FOR IP): Performed by: INTERNAL MEDICINE

## 2023-07-10 PROCEDURE — 82728 ASSAY OF FERRITIN: CPT

## 2023-07-10 PROCEDURE — 2580000003 HC RX 258: Performed by: NURSE PRACTITIONER

## 2023-07-10 PROCEDURE — 83521 IG LIGHT CHAINS FREE EACH: CPT

## 2023-07-10 PROCEDURE — 2000000000 HC ICU R&B

## 2023-07-10 PROCEDURE — 85045 AUTOMATED RETICULOCYTE COUNT: CPT

## 2023-07-10 PROCEDURE — 6360000002 HC RX W HCPCS: Performed by: INTERNAL MEDICINE

## 2023-07-10 PROCEDURE — 84165 PROTEIN E-PHORESIS SERUM: CPT

## 2023-07-10 PROCEDURE — 84155 ASSAY OF PROTEIN SERUM: CPT

## 2023-07-10 PROCEDURE — 83540 ASSAY OF IRON: CPT

## 2023-07-10 PROCEDURE — 86038 ANTINUCLEAR ANTIBODIES: CPT

## 2023-07-10 PROCEDURE — 84443 ASSAY THYROID STIM HORMONE: CPT

## 2023-07-10 RX ORDER — 3% SODIUM CHLORIDE 3 G/100ML
25 INJECTION, SOLUTION INTRAVENOUS CONTINUOUS
Status: DISPENSED | OUTPATIENT
Start: 2023-07-10 | End: 2023-07-11

## 2023-07-10 RX ORDER — 3% SODIUM CHLORIDE 3 G/100ML
25 INJECTION, SOLUTION INTRAVENOUS CONTINUOUS
Status: DISPENSED | OUTPATIENT
Start: 2023-07-10 | End: 2023-07-10

## 2023-07-10 RX ADMIN — SODIUM CHLORIDE 25 ML/HR: 3 INJECTION, SOLUTION INTRAVENOUS at 23:25

## 2023-07-10 RX ADMIN — ACETAMINOPHEN 650 MG: 325 TABLET ORAL at 04:13

## 2023-07-10 RX ADMIN — APIXABAN 5 MG: 5 TABLET, FILM COATED ORAL at 08:42

## 2023-07-10 RX ADMIN — ACETAMINOPHEN 650 MG: 325 TABLET ORAL at 19:29

## 2023-07-10 RX ADMIN — APIXABAN 5 MG: 5 TABLET, FILM COATED ORAL at 21:26

## 2023-07-10 RX ADMIN — ONDANSETRON 4 MG: 2 INJECTION INTRAMUSCULAR; INTRAVENOUS at 19:09

## 2023-07-10 RX ADMIN — SODIUM CHLORIDE, PRESERVATIVE FREE 10 ML: 5 INJECTION INTRAVENOUS at 08:42

## 2023-07-10 RX ADMIN — SODIUM CHLORIDE, PRESERVATIVE FREE 10 ML: 5 INJECTION INTRAVENOUS at 21:30

## 2023-07-10 RX ADMIN — SODIUM CHLORIDE 25 ML/HR: 3 INJECTION, SOLUTION INTRAVENOUS at 06:30

## 2023-07-10 RX ADMIN — SODIUM CHLORIDE 25 ML/HR: 3 INJECTION, SOLUTION INTRAVENOUS at 14:15

## 2023-07-10 ASSESSMENT — PAIN DESCRIPTION - ORIENTATION
ORIENTATION: LEFT;RIGHT
ORIENTATION: RIGHT;LEFT;LOWER

## 2023-07-10 ASSESSMENT — PAIN SCALES - GENERAL
PAINLEVEL_OUTOF10: 4
PAINLEVEL_OUTOF10: 0
PAINLEVEL_OUTOF10: 4
PAINLEVEL_OUTOF10: 5
PAINLEVEL_OUTOF10: 0

## 2023-07-10 ASSESSMENT — PAIN DESCRIPTION - DESCRIPTORS
DESCRIPTORS: ACHING;SORE
DESCRIPTORS: ACHING

## 2023-07-10 ASSESSMENT — PAIN DESCRIPTION - LOCATION
LOCATION: LEG
LOCATION: LEG

## 2023-07-10 NOTE — CARE COORDINATION
7/10/2023  2:14 PM  Pt emergently admitted 7/8/23 for hyponatremia, update via chart review, pt is continuing to require medical management for severe hyponatremia, pancytopenia, mild renal insufficiency, gastroenteritis, HTN, Hx of PE, Hx AFIb, GERD, DLD, anxiety  Transitions of Care Plan:  RUR 13 % Low Risk of Readmission/Green  LOS 2 Days   Medical management continues   Nephrology following   Oncology following   CM to follow through for treatment/response  DC when stable to home, pt lives alone,independent ADLS, pt has family who lives nearby, can stay w/ daughter post-discharge if needed   Outpatient follow up PCP, specialists  Family will transport at DC  CM will continue to follow and assist w/ DC needs  Smith Enter

## 2023-07-11 PROBLEM — I48.91 ATRIAL FIBRILLATION (HCC): Status: ACTIVE | Noted: 2020-11-08

## 2023-07-11 PROBLEM — L40.50 PSORIATIC ARTHRITIS (HCC): Status: ACTIVE | Noted: 2023-07-11

## 2023-07-11 PROBLEM — D61.818 PANCYTOPENIA (HCC): Status: ACTIVE | Noted: 2023-07-11

## 2023-07-11 LAB
ANION GAP SERPL CALC-SCNC: 7 MMOL/L (ref 5–15)
APPEARANCE UR: CLEAR
B PERT DNA SPEC QL NAA+PROBE: NOT DETECTED
BACTERIA URNS QL MICRO: NEGATIVE /HPF
BASOPHILS # BLD: 0 K/UL (ref 0–0.1)
BASOPHILS NFR BLD: 2 % (ref 0–1)
BILIRUB UR QL: NEGATIVE
BORDETELLA PARAPERTUSSIS BY PCR: NOT DETECTED
BUN SERPL-MCNC: 15 MG/DL (ref 6–20)
BUN/CREAT SERPL: 14 (ref 12–20)
C PNEUM DNA SPEC QL NAA+PROBE: NOT DETECTED
CALCIUM SERPL-MCNC: 8.1 MG/DL (ref 8.5–10.1)
CHLORIDE SERPL-SCNC: 94 MMOL/L (ref 97–108)
CO2 SERPL-SCNC: 21 MMOL/L (ref 21–32)
COLOR UR: ABNORMAL
CREAT SERPL-MCNC: 1.08 MG/DL (ref 0.55–1.02)
DIFFERENTIAL METHOD BLD: ABNORMAL
EKG DIAGNOSIS: NORMAL
EKG Q-T INTERVAL: 376 MS
EKG QRS DURATION: 80 MS
EKG QTC CALCULATION (BAZETT): 428 MS
EKG R AXIS: 157 DEGREES
EKG T AXIS: 161 DEGREES
EKG VENTRICULAR RATE: 78 BPM
EOSINOPHIL # BLD: 0 K/UL (ref 0–0.4)
EOSINOPHIL NFR BLD: 0 % (ref 0–7)
EPITH CASTS URNS QL MICRO: ABNORMAL /LPF
ERYTHROCYTE [DISTWIDTH] IN BLOOD BY AUTOMATED COUNT: 13.5 % (ref 11.5–14.5)
FLUAV SUBTYP SPEC NAA+PROBE: NOT DETECTED
FLUBV RNA SPEC QL NAA+PROBE: NOT DETECTED
GLUCOSE SERPL-MCNC: 100 MG/DL (ref 65–100)
GLUCOSE UR STRIP.AUTO-MCNC: NEGATIVE MG/DL
HADV DNA SPEC QL NAA+PROBE: NOT DETECTED
HBV CORE AB SERPL QL IA: NEGATIVE
HCOV 229E RNA SPEC QL NAA+PROBE: NOT DETECTED
HCOV HKU1 RNA SPEC QL NAA+PROBE: NOT DETECTED
HCOV NL63 RNA SPEC QL NAA+PROBE: NOT DETECTED
HCOV OC43 RNA SPEC QL NAA+PROBE: NOT DETECTED
HCT VFR BLD AUTO: 30 % (ref 35–47)
HGB BLD-MCNC: 10.3 G/DL (ref 11.5–16)
HGB UR QL STRIP: NEGATIVE
HMPV RNA SPEC QL NAA+PROBE: NOT DETECTED
HPIV1 RNA SPEC QL NAA+PROBE: NOT DETECTED
HPIV2 RNA SPEC QL NAA+PROBE: NOT DETECTED
HPIV3 RNA SPEC QL NAA+PROBE: NOT DETECTED
HPIV4 RNA SPEC QL NAA+PROBE: NOT DETECTED
HYALINE CASTS URNS QL MICRO: ABNORMAL /LPF (ref 0–2)
IMM GRANULOCYTES # BLD AUTO: 0 K/UL
IMM GRANULOCYTES NFR BLD AUTO: 0 %
KETONES UR QL STRIP.AUTO: ABNORMAL MG/DL
LEUKOCYTE ESTERASE UR QL STRIP.AUTO: ABNORMAL
LYMPHOCYTES # BLD: 0.3 K/UL (ref 0.8–3.5)
LYMPHOCYTES NFR BLD: 24 % (ref 12–49)
M PNEUMO DNA SPEC QL NAA+PROBE: NOT DETECTED
MCH RBC QN AUTO: 33.8 PG (ref 26–34)
MCHC RBC AUTO-ENTMCNC: 34.3 G/DL (ref 30–36.5)
MCV RBC AUTO: 98.4 FL (ref 80–99)
MONOCYTES # BLD: 0.1 K/UL (ref 0–1)
MONOCYTES NFR BLD: 8 % (ref 5–13)
NEUTS BAND NFR BLD MANUAL: 12 % (ref 0–6)
NEUTS SEG # BLD: 1 K/UL (ref 1.8–8)
NEUTS SEG NFR BLD: 54 % (ref 32–75)
NITRITE UR QL STRIP.AUTO: NEGATIVE
NRBC # BLD: 0 K/UL (ref 0–0.01)
NRBC BLD-RTO: 0 PER 100 WBC
PH UR STRIP: 6 (ref 5–8)
PLATELET # BLD AUTO: 61 K/UL (ref 150–400)
PMV BLD AUTO: 12.9 FL (ref 8.9–12.9)
POTASSIUM SERPL-SCNC: 4.1 MMOL/L (ref 3.5–5.1)
PROCALCITONIN SERPL-MCNC: <0.05 NG/ML
PROT UR STRIP-MCNC: NEGATIVE MG/DL
RBC # BLD AUTO: 3.05 M/UL (ref 3.8–5.2)
RBC #/AREA URNS HPF: ABNORMAL /HPF (ref 0–5)
RBC MORPH BLD: ABNORMAL
RSV RNA SPEC QL NAA+PROBE: NOT DETECTED
RV+EV RNA SPEC QL NAA+PROBE: NOT DETECTED
SARS-COV-2 RNA RESP QL NAA+PROBE: NOT DETECTED
SODIUM SERPL-SCNC: 121 MMOL/L (ref 136–145)
SODIUM SERPL-SCNC: 122 MMOL/L (ref 136–145)
SODIUM SERPL-SCNC: 124 MMOL/L (ref 136–145)
SODIUM SERPL-SCNC: 125 MMOL/L (ref 136–145)
SODIUM SERPL-SCNC: 126 MMOL/L (ref 136–145)
SODIUM UR-SCNC: 32 MMOL/L
SP GR UR REFRACTOMETRY: 1 (ref 1–1.03)
TOTAL CELLS COUNTED SPEC: 50
UROBILINOGEN UR QL STRIP.AUTO: 0.2 EU/DL (ref 0.2–1)
WBC # BLD AUTO: 1.4 K/UL (ref 3.6–11)
WBC MORPH BLD: ABNORMAL
WBC URNS QL MICRO: ABNORMAL /HPF (ref 0–4)

## 2023-07-11 PROCEDURE — 80048 BASIC METABOLIC PNL TOTAL CA: CPT

## 2023-07-11 PROCEDURE — 84145 PROCALCITONIN (PCT): CPT

## 2023-07-11 PROCEDURE — 85025 COMPLETE CBC W/AUTO DIFF WBC: CPT

## 2023-07-11 PROCEDURE — 99232 SBSQ HOSP IP/OBS MODERATE 35: CPT | Performed by: INTERNAL MEDICINE

## 2023-07-11 PROCEDURE — 84295 ASSAY OF SERUM SODIUM: CPT

## 2023-07-11 PROCEDURE — 36415 COLL VENOUS BLD VENIPUNCTURE: CPT

## 2023-07-11 PROCEDURE — 84300 ASSAY OF URINE SODIUM: CPT

## 2023-07-11 PROCEDURE — 0202U NFCT DS 22 TRGT SARS-COV-2: CPT

## 2023-07-11 PROCEDURE — 93010 ELECTROCARDIOGRAM REPORT: CPT | Performed by: SPECIALIST

## 2023-07-11 PROCEDURE — 81001 URINALYSIS AUTO W/SCOPE: CPT

## 2023-07-11 PROCEDURE — 2580000003 HC RX 258: Performed by: INTERNAL MEDICINE

## 2023-07-11 PROCEDURE — 6370000000 HC RX 637 (ALT 250 FOR IP): Performed by: INTERNAL MEDICINE

## 2023-07-11 PROCEDURE — 94761 N-INVAS EAR/PLS OXIMETRY MLT: CPT

## 2023-07-11 PROCEDURE — 2000000000 HC ICU R&B

## 2023-07-11 PROCEDURE — 6370000000 HC RX 637 (ALT 250 FOR IP)

## 2023-07-11 RX ORDER — 3% SODIUM CHLORIDE 3 G/100ML
50 INJECTION, SOLUTION INTRAVENOUS CONTINUOUS
Status: DISPENSED | OUTPATIENT
Start: 2023-07-11 | End: 2023-07-11

## 2023-07-11 RX ORDER — MENTHOL 1.4 %
ADHESIVE PATCH, MEDICATED TOPICAL
Status: DISCONTINUED | OUTPATIENT
Start: 2023-07-11 | End: 2023-07-13 | Stop reason: HOSPADM

## 2023-07-11 RX ADMIN — MENTHOL, METHYL SALICYLATE: 10; 15 CREAM TOPICAL at 02:51

## 2023-07-11 RX ADMIN — MENTHOL, METHYL SALICYLATE: 10; 15 CREAM TOPICAL at 22:10

## 2023-07-11 RX ADMIN — ACETAMINOPHEN 650 MG: 325 TABLET ORAL at 21:51

## 2023-07-11 RX ADMIN — SODIUM CHLORIDE, PRESERVATIVE FREE 10 ML: 5 INJECTION INTRAVENOUS at 09:21

## 2023-07-11 RX ADMIN — APIXABAN 5 MG: 5 TABLET, FILM COATED ORAL at 09:21

## 2023-07-11 RX ADMIN — SODIUM CHLORIDE 50 ML/HR: 3 INJECTION, SOLUTION INTRAVENOUS at 11:27

## 2023-07-11 RX ADMIN — MENTHOL, METHYL SALICYLATE: 10; 15 CREAM TOPICAL at 17:56

## 2023-07-11 RX ADMIN — SODIUM CHLORIDE, PRESERVATIVE FREE 30 ML: 5 INJECTION INTRAVENOUS at 21:00

## 2023-07-11 RX ADMIN — ACETAMINOPHEN 650 MG: 325 TABLET ORAL at 02:50

## 2023-07-11 RX ADMIN — APIXABAN 5 MG: 5 TABLET, FILM COATED ORAL at 21:00

## 2023-07-11 ASSESSMENT — PAIN SCALES - GENERAL
PAINLEVEL_OUTOF10: 0
PAINLEVEL_OUTOF10: 6
PAINLEVEL_OUTOF10: 0

## 2023-07-11 ASSESSMENT — PAIN DESCRIPTION - DESCRIPTORS: DESCRIPTORS: ACHING

## 2023-07-11 ASSESSMENT — PAIN DESCRIPTION - LOCATION: LOCATION: FOOT

## 2023-07-11 ASSESSMENT — PAIN DESCRIPTION - ORIENTATION: ORIENTATION: RIGHT;LEFT

## 2023-07-11 NOTE — FLOWSHEET NOTE
Patient intermittently hypotensive and not meeting MAP goal. Can not bolus - hyponatremia correction in progress. Patient sleeping. Consider pressor w/ persistent hypotension. Nursing to continue to monitor. 0315: Patient awake and sitting in chair. /51. Nursing to continue to monitor. 9729: PLT 61 on AM labs. No obvious bleeding. Hemeonc following. Nursing to continue to monitor.

## 2023-07-11 NOTE — PLAN OF CARE
Problem: Discharge Planning  Goal: Discharge to home or other facility with appropriate resources  Outcome: Progressing  Flowsheets (Taken 7/11/2023 0400)  Discharge to home or other facility with appropriate resources:   Identify barriers to discharge with patient and caregiver   Arrange for needed discharge resources and transportation as appropriate   Identify discharge learning needs (meds, wound care, etc)   Refer to discharge planning if patient needs post-hospital services based on physician order or complex needs related to functional status, cognitive ability or social support system     Problem: Safety - Adult  Goal: Free from fall injury  Outcome: Progressing

## 2023-07-11 NOTE — CARE COORDINATION
Transitions of Care Plan - RUR 13%:  Medical management continues   Nephrology following   Oncology following   CM following - patient lives alone but has a supportive family and is able to stay with daughter post discharge if needed  Plan is to discharge home when stable  Outpatient follow up   Family will transport at DC  Will continue to monitor progress and response to treatment

## 2023-07-11 NOTE — PLAN OF CARE
Problem: Safety - Adult  Goal: Free from fall injury  7/11/2023 1350 by Lizy Us RN  Outcome: Progressing  7/11/2023 0815 by Leann Perez RN  Outcome: Progressing     Problem: Pain  Goal: Verbalizes/displays adequate comfort level or baseline comfort level  Outcome: Progressing

## 2023-07-12 LAB
ANA SER QL: NEGATIVE
ANION GAP SERPL CALC-SCNC: 5 MMOL/L (ref 5–15)
ANION GAP SERPL CALC-SCNC: 7 MMOL/L (ref 5–15)
BASOPHILS # BLD: 0 K/UL (ref 0–0.1)
BASOPHILS NFR BLD: 0 % (ref 0–1)
BUN SERPL-MCNC: 14 MG/DL (ref 6–20)
BUN SERPL-MCNC: 15 MG/DL (ref 6–20)
BUN/CREAT SERPL: 11 (ref 12–20)
BUN/CREAT SERPL: 13 (ref 12–20)
CALCIUM SERPL-MCNC: 8.8 MG/DL (ref 8.5–10.1)
CALCIUM SERPL-MCNC: 8.9 MG/DL (ref 8.5–10.1)
CHLORIDE SERPL-SCNC: 101 MMOL/L (ref 97–108)
CHLORIDE SERPL-SCNC: 101 MMOL/L (ref 97–108)
CO2 SERPL-SCNC: 23 MMOL/L (ref 21–32)
CO2 SERPL-SCNC: 24 MMOL/L (ref 21–32)
CREAT SERPL-MCNC: 1.16 MG/DL (ref 0.55–1.02)
CREAT SERPL-MCNC: 1.23 MG/DL (ref 0.55–1.02)
DIFFERENTIAL METHOD BLD: ABNORMAL
EOSINOPHIL # BLD: 0 K/UL (ref 0–0.4)
EOSINOPHIL NFR BLD: 0 % (ref 0–7)
ERYTHROCYTE [DISTWIDTH] IN BLOOD BY AUTOMATED COUNT: 14 % (ref 11.5–14.5)
GLUCOSE SERPL-MCNC: 119 MG/DL (ref 65–100)
GLUCOSE SERPL-MCNC: 124 MG/DL (ref 65–100)
HCT VFR BLD AUTO: 32.8 % (ref 35–47)
HGB BLD-MCNC: 11.4 G/DL (ref 11.5–16)
IMM GRANULOCYTES # BLD AUTO: 0 K/UL
IMM GRANULOCYTES NFR BLD AUTO: 0 %
LYMPHOCYTES # BLD: 0.6 K/UL (ref 0.8–3.5)
LYMPHOCYTES NFR BLD: 38 % (ref 12–49)
MCH RBC QN AUTO: 34.1 PG (ref 26–34)
MCHC RBC AUTO-ENTMCNC: 34.8 G/DL (ref 30–36.5)
MCV RBC AUTO: 98.2 FL (ref 80–99)
MONOCYTES # BLD: 0.1 K/UL (ref 0–1)
MONOCYTES NFR BLD: 9 % (ref 5–13)
NEUTS BAND NFR BLD MANUAL: 3 % (ref 0–6)
NEUTS SEG # BLD: 0.9 K/UL (ref 1.8–8)
NEUTS SEG NFR BLD: 50 % (ref 32–75)
NRBC # BLD: 0 K/UL (ref 0–0.01)
NRBC BLD-RTO: 0 PER 100 WBC
OSMOLALITY UR: 128 MOSM/KG H2O
PLATELET # BLD AUTO: 65 K/UL (ref 150–400)
POTASSIUM SERPL-SCNC: 4 MMOL/L (ref 3.5–5.1)
POTASSIUM SERPL-SCNC: 4.1 MMOL/L (ref 3.5–5.1)
RBC # BLD AUTO: 3.34 M/UL (ref 3.8–5.2)
RBC MORPH BLD: ABNORMAL
SODIUM SERPL-SCNC: 129 MMOL/L (ref 136–145)
SODIUM SERPL-SCNC: 130 MMOL/L (ref 136–145)
SODIUM SERPL-SCNC: 131 MMOL/L (ref 136–145)
SODIUM UR-SCNC: 21 MMOL/L
SODIUM UR-SCNC: 22 MMOL/L
WBC # BLD AUTO: 1.6 K/UL (ref 3.6–11)

## 2023-07-12 PROCEDURE — 2580000003 HC RX 258: Performed by: INTERNAL MEDICINE

## 2023-07-12 PROCEDURE — 83935 ASSAY OF URINE OSMOLALITY: CPT

## 2023-07-12 PROCEDURE — 36415 COLL VENOUS BLD VENIPUNCTURE: CPT

## 2023-07-12 PROCEDURE — 6370000000 HC RX 637 (ALT 250 FOR IP): Performed by: INTERNAL MEDICINE

## 2023-07-12 PROCEDURE — 94761 N-INVAS EAR/PLS OXIMETRY MLT: CPT

## 2023-07-12 PROCEDURE — 85025 COMPLETE CBC W/AUTO DIFF WBC: CPT

## 2023-07-12 PROCEDURE — 80048 BASIC METABOLIC PNL TOTAL CA: CPT

## 2023-07-12 PROCEDURE — 1100000000 HC RM PRIVATE

## 2023-07-12 PROCEDURE — 84300 ASSAY OF URINE SODIUM: CPT

## 2023-07-12 PROCEDURE — 84295 ASSAY OF SERUM SODIUM: CPT

## 2023-07-12 RX ADMIN — SODIUM CHLORIDE, PRESERVATIVE FREE 10 ML: 5 INJECTION INTRAVENOUS at 20:50

## 2023-07-12 RX ADMIN — APIXABAN 5 MG: 5 TABLET, FILM COATED ORAL at 20:50

## 2023-07-12 RX ADMIN — APIXABAN 5 MG: 5 TABLET, FILM COATED ORAL at 08:41

## 2023-07-12 RX ADMIN — SODIUM CHLORIDE, PRESERVATIVE FREE 10 ML: 5 INJECTION INTRAVENOUS at 09:00

## 2023-07-12 NOTE — CARE COORDINATION
7/12/2023  1:53 PM  Pt emergently admitted 7/8/23 for hyponatremia, update via chart review, pt is continuing to require medical management for severe hyponatremia, pancytopenia, mild renal insufficiency, gastroenteritis, HTN, Hx of PE, Hx AFIb, GERD, DLD, anxiety  Transitions of Care Plan:  RUR 13 % Low Risk of Readmission/Green  LOS 4 Days   Medical management continues   Nephrology following, labs pending    Oncology following   CM to follow through for treatment/response  DC when stable to home, pt lives alone,independent ADLS, pt has family who lives nearby, can stay w/ daughter post-discharge if needed   Outpatient follow up PCP, specialists  Family will transport at DC  CM will continue to follow and assist w/ DC needs  Melinda Foss

## 2023-07-13 VITALS
DIASTOLIC BLOOD PRESSURE: 52 MMHG | BODY MASS INDEX: 32.97 KG/M2 | TEMPERATURE: 98.1 F | RESPIRATION RATE: 20 BRPM | HEART RATE: 60 BPM | OXYGEN SATURATION: 97 % | HEIGHT: 64 IN | WEIGHT: 193.12 LBS | SYSTOLIC BLOOD PRESSURE: 112 MMHG

## 2023-07-13 LAB
ANION GAP SERPL CALC-SCNC: 8 MMOL/L (ref 5–15)
BASOPHILS # BLD: 0 K/UL (ref 0–0.1)
BASOPHILS NFR BLD: 1 % (ref 0–1)
BUN SERPL-MCNC: 12 MG/DL (ref 6–20)
BUN/CREAT SERPL: 12 (ref 12–20)
CALCIUM SERPL-MCNC: 8.8 MG/DL (ref 8.5–10.1)
CHLORIDE SERPL-SCNC: 101 MMOL/L (ref 97–108)
CO2 SERPL-SCNC: 23 MMOL/L (ref 21–32)
CREAT SERPL-MCNC: 1.04 MG/DL (ref 0.55–1.02)
DIFFERENTIAL METHOD BLD: ABNORMAL
EOSINOPHIL # BLD: 0 K/UL (ref 0–0.4)
EOSINOPHIL NFR BLD: 1 % (ref 0–7)
ERYTHROCYTE [DISTWIDTH] IN BLOOD BY AUTOMATED COUNT: 14 % (ref 11.5–14.5)
GLUCOSE SERPL-MCNC: 119 MG/DL (ref 65–100)
HCT VFR BLD AUTO: 33.6 % (ref 35–47)
HGB BLD-MCNC: 11.4 G/DL (ref 11.5–16)
IMM GRANULOCYTES # BLD AUTO: 0 K/UL
IMM GRANULOCYTES NFR BLD AUTO: 0 %
LYMPHOCYTES # BLD: 1.2 K/UL (ref 0.8–3.5)
LYMPHOCYTES NFR BLD: 38 % (ref 12–49)
MCH RBC QN AUTO: 33.7 PG (ref 26–34)
MCHC RBC AUTO-ENTMCNC: 33.9 G/DL (ref 30–36.5)
MCV RBC AUTO: 99.4 FL (ref 80–99)
MONOCYTES # BLD: 0.2 K/UL (ref 0–1)
MONOCYTES NFR BLD: 7 % (ref 5–13)
MYELOCYTES NFR BLD MANUAL: 1 %
NEUTS BAND NFR BLD MANUAL: 1 % (ref 0–6)
NEUTS SEG # BLD: 1.6 K/UL (ref 1.8–8)
NEUTS SEG NFR BLD: 51 % (ref 32–75)
NRBC # BLD: 0 K/UL (ref 0–0.01)
NRBC BLD-RTO: 0 PER 100 WBC
PLATELET # BLD AUTO: 81 K/UL (ref 150–400)
POTASSIUM SERPL-SCNC: 3.8 MMOL/L (ref 3.5–5.1)
RBC # BLD AUTO: 3.38 M/UL (ref 3.8–5.2)
RBC MORPH BLD: ABNORMAL
SODIUM SERPL-SCNC: 132 MMOL/L (ref 136–145)
WBC # BLD AUTO: 3.1 K/UL (ref 3.6–11)

## 2023-07-13 PROCEDURE — 99232 SBSQ HOSP IP/OBS MODERATE 35: CPT | Performed by: INTERNAL MEDICINE

## 2023-07-13 PROCEDURE — 6370000000 HC RX 637 (ALT 250 FOR IP): Performed by: STUDENT IN AN ORGANIZED HEALTH CARE EDUCATION/TRAINING PROGRAM

## 2023-07-13 PROCEDURE — 80048 BASIC METABOLIC PNL TOTAL CA: CPT

## 2023-07-13 PROCEDURE — 85025 COMPLETE CBC W/AUTO DIFF WBC: CPT

## 2023-07-13 PROCEDURE — 94761 N-INVAS EAR/PLS OXIMETRY MLT: CPT

## 2023-07-13 PROCEDURE — 6370000000 HC RX 637 (ALT 250 FOR IP): Performed by: INTERNAL MEDICINE

## 2023-07-13 PROCEDURE — 36415 COLL VENOUS BLD VENIPUNCTURE: CPT

## 2023-07-13 PROCEDURE — 2580000003 HC RX 258: Performed by: INTERNAL MEDICINE

## 2023-07-13 RX ORDER — LISINOPRIL 20 MG/1
10 TABLET ORAL DAILY
Qty: 30 TABLET | Refills: 0 | Status: SHIPPED
Start: 2023-07-13

## 2023-07-13 RX ORDER — SPIRONOLACTONE 50 MG/1
50 TABLET, FILM COATED ORAL DAILY
Qty: 30 TABLET | Refills: 0 | Status: SHIPPED
Start: 2023-07-20

## 2023-07-13 RX ORDER — AMLODIPINE BESYLATE 2.5 MG/1
2.5 TABLET ORAL 2 TIMES DAILY
Qty: 30 TABLET | Refills: 0 | Status: SHIPPED
Start: 2023-07-13

## 2023-07-13 RX ADMIN — APIXABAN 5 MG: 5 TABLET, FILM COATED ORAL at 08:17

## 2023-07-13 RX ADMIN — SODIUM CHLORIDE, PRESERVATIVE FREE 10 ML: 5 INJECTION INTRAVENOUS at 08:17

## 2023-07-13 RX ADMIN — METOPROLOL TARTRATE 25 MG: 25 TABLET, FILM COATED ORAL at 10:24

## 2023-07-13 NOTE — PLAN OF CARE
Problem: Discharge Planning  Goal: Discharge to home or other facility with appropriate resources  Outcome: Progressing     Problem: Safety - Adult  Goal: Free from fall injury  7/13/2023 0044 by Karina Ernandez RN  Outcome: Progressing  7/12/2023 1923 by Marium Zhu RN  Outcome: Progressing     Problem: Pain  Goal: Verbalizes/displays adequate comfort level or baseline comfort level  7/13/2023 0044 by Karina Ernandez RN  Outcome: Progressing  7/12/2023 1923 by Marium Zhu RN  Outcome: Progressing

## 2023-07-13 NOTE — CARE COORDINATION
Transitions of Care Plan - RUR 13%:  Discharge order noted  Plan is for patient to discharge home with family support   Outpatient follow up   Family will transport at discharge Alert and oriented to person, place and time

## 2023-07-13 NOTE — DISCHARGE SUMMARY
Discharge Summary   Please note that this dictation was completed with [employer], the computer voice recognition software. Quite often unanticipated grammatical, syntax, homophones, and other interpretive errors are inadvertently transcribed by the computer software. Please disregard these errors. Please excuse any errors that have escaped final proofreading. PATIENT ID: Inés Flores  MRN: [id number]   YOB: 1940    DATE OF ADMISSION: 7/8/2023  7:47 AM    DATE OF DISCHARGE: 7/13/2023  PRIMARY CARE PROVIDER: ESTEFANÍA Wu NP         ATTENDING PHYSICIAN: Hans Loredo MD  DISCHARGING PROVIDER: Hans Loredo MD       CONSULTATIONS: IP CONSULT TO NEPHROLOGY  IP CONSULT TO INTENSIVIST  IP CONSULT TO HEMATOLOGY    PROCEDURES/SURGERIES: * No surgery found *    ADMITTING HPI from excerpted H&P      Quan Saucedo is a 78 y.o.  F hx PE and Afib on apixaban, HTN, who presents due to low sodium. She has had several days of crampy abdominal pain, diarrhea and intense nausea with decreased PO intake. She went urgent care in [address] [place] [address] yesterday and was called later with a reported sodium of 119. Her daughter, who is present for additional hx, drove to get her this morning and noted her to be weak and lethargic. In ER here, Na is 114. She received a rapid bolus of 1L ns. On my exam she is lucid and feeling better, looking better according to daughter. Available records were reviewed at the time of H&        [facility] DIAGNOSIS/ PLAN:     Severe Hyponatremia, hypo-osmolar  Likely due to hypovolemia. Presented with Na 114 with lethargy at home. Sodium overcorrected from 114 -> 124  in < 24 hours. S/p DDAVP  x 2 doses, D5W and 3% saline. Nephrology was consulted and guided management. Outpatient follow up with PCP in 1 week with repeat BMP.         Pancytopenia: Potentially related to the viral syndrome that precipitated the above, but will check smear, retic, and

## 2023-07-13 NOTE — DISCHARGE INSTRUCTIONS
Adeola Barrios.





2143 


 


Output Total 2000 950 250


 


Balance -2 1198 -250














Physical Exam





- Physical Exam


General Appearance: WD/WN, alert, no apparent distress


Respiratory: No respiratory distress, No accessory muscle use


Abdomen: other (Myoclonus)


Skin: normal color, warm/dry


Neuro/Psych: alert, normal mood/affect, oriented x 3





ICD10 Worksheet


Patient Problems: 


 Problems











Problem Status Onset


 


Brain mass Acute  


 


Hypotension Acute  


 


Pneumonia Acute  


 


Seizure Acute  


 


Sepsis Acute You came to the hospital with lethargy and confusion and were found to have dangerously low sodium levels likely as a result of dehydration. You were admitted to the ICU and were seen by a nephrologist (kidney doctor) to manage sodium levels. Please do not take Spironolactone medication for a week after discharge, resume taking it on 7/20/2023. Please stop taking furosemide.

## 2023-07-17 LAB
ALBUMIN SERPL ELPH-MCNC: 3.1 G/DL (ref 2.9–4.4)
ALBUMIN/GLOB SERPL: 1.3 (ref 0.7–1.7)
ALPHA1 GLOB SERPL ELPH-MCNC: 0.2 G/DL (ref 0–0.4)
ALPHA2 GLOB SERPL ELPH-MCNC: 0.7 G/DL (ref 0.4–1)
B-GLOBULIN SERPL ELPH-MCNC: 0.8 G/DL (ref 0.7–1.3)
GAMMA GLOB SERPL ELPH-MCNC: 0.7 G/DL (ref 0.4–1.8)
GLOBULIN SER-MCNC: 2.4 G/DL (ref 2.2–3.9)
IGA SERPL-MCNC: 165 MG/DL (ref 64–422)
IGG SERPL-MCNC: 756 MG/DL (ref 586–1602)
IGM SERPL-MCNC: 62 MG/DL (ref 26–217)
INTERPRETATION SERPL IEP-IMP: ABNORMAL
KAPPA LC FREE SER-MCNC: 43.7 MG/L (ref 3.3–19.4)
KAPPA LC FREE/LAMBDA FREE SER: 1.77 (ref 0.26–1.65)
LAMBDA LC FREE SERPL-MCNC: 24.7 MG/L (ref 5.7–26.3)
M PROTEIN SERPL ELPH-MCNC: ABNORMAL G/DL
PROT SERPL-MCNC: 5.5 G/DL (ref 6–8.5)

## 2024-01-13 ENCOUNTER — HOSPITAL ENCOUNTER (OUTPATIENT)
Facility: HOSPITAL | Age: 84
Discharge: HOME | End: 2024-01-16
Payer: MEDICARE

## 2024-01-13 LAB
ALBUMIN SERPL-MCNC: 3.7 G/DL (ref 3.5–5)
ALBUMIN/GLOB SERPL: 1 (ref 1.1–2.2)
ALP SERPL-CCNC: 85 U/L (ref 45–117)
ALT SERPL-CCNC: 15 U/L (ref 12–78)
ANION GAP SERPL CALC-SCNC: 9 MMOL/L (ref 5–15)
AST SERPL W P-5'-P-CCNC: 24 U/L (ref 15–37)
BILIRUB SERPL-MCNC: 0.4 MG/DL (ref 0.2–1)
BUN SERPL-MCNC: 21 MG/DL (ref 6–20)
BUN/CREAT SERPL: 16 (ref 12–20)
CA-I BLD-MCNC: 9.4 MG/DL (ref 8.5–10.1)
CHLORIDE SERPL-SCNC: 104 MMOL/L (ref 97–108)
CO2 SERPL-SCNC: 28 MMOL/L (ref 21–32)
CREAT SERPL-MCNC: 1.33 MG/DL (ref 0.55–1.02)
GLOBULIN SER CALC-MCNC: 3.8 G/DL (ref 2–4)
GLUCOSE SERPL-MCNC: 112 MG/DL (ref 65–100)
POTASSIUM SERPL-SCNC: 4.9 MMOL/L (ref 3.5–5.1)
PROT SERPL-MCNC: 7.5 G/DL (ref 6.4–8.2)
SODIUM SERPL-SCNC: 141 MMOL/L (ref 136–145)

## 2024-01-13 PROCEDURE — 80053 COMPREHEN METABOLIC PANEL: CPT

## 2024-01-13 PROCEDURE — 36415 COLL VENOUS BLD VENIPUNCTURE: CPT

## 2024-03-05 ENCOUNTER — HOSPITAL ENCOUNTER (OUTPATIENT)
Age: 84
Discharge: HOME | End: 2024-03-08
Payer: MEDICARE

## 2024-03-05 DIAGNOSIS — K11.7 DISTURBANCE OF SALIVARY SECRETION: ICD-10-CM

## 2024-03-05 PROCEDURE — 36415 COLL VENOUS BLD VENIPUNCTURE: CPT

## 2024-03-05 PROCEDURE — 86235 NUCLEAR ANTIGEN ANTIBODY: CPT

## 2024-03-06 LAB
ENA SS-A AB SER-ACNC: <0.2 AI (ref 0–0.9)
ENA SS-B AB SER-ACNC: <0.2 AI (ref 0–0.9)

## 2024-06-30 ENCOUNTER — HOSPITAL ENCOUNTER (EMERGENCY)
Facility: HOSPITAL | Age: 84
Discharge: HOME | End: 2024-06-30
Attending: EMERGENCY MEDICINE
Payer: MEDICARE

## 2024-06-30 ENCOUNTER — APPOINTMENT (OUTPATIENT)
Facility: HOSPITAL | Age: 84
End: 2024-06-30
Payer: MEDICARE

## 2024-06-30 VITALS
WEIGHT: 194.67 LBS | RESPIRATION RATE: 16 BRPM | OXYGEN SATURATION: 98 % | HEIGHT: 64 IN | DIASTOLIC BLOOD PRESSURE: 74 MMHG | HEART RATE: 67 BPM | SYSTOLIC BLOOD PRESSURE: 190 MMHG | TEMPERATURE: 98.1 F | BODY MASS INDEX: 33.23 KG/M2

## 2024-06-30 DIAGNOSIS — R51.9 NONINTRACTABLE HEADACHE, UNSPECIFIED CHRONICITY PATTERN, UNSPECIFIED HEADACHE TYPE: Primary | ICD-10-CM

## 2024-06-30 DIAGNOSIS — I16.0 HYPERTENSIVE URGENCY: ICD-10-CM

## 2024-06-30 LAB
ALBUMIN SERPL-MCNC: 4 G/DL (ref 3.5–5)
ALBUMIN/GLOB SERPL: 1.3 (ref 1.1–2.2)
ALP SERPL-CCNC: 75 U/L (ref 45–117)
ALT SERPL-CCNC: 20 U/L (ref 12–78)
ANION GAP SERPL CALC-SCNC: 6 MMOL/L (ref 5–15)
AST SERPL-CCNC: 23 U/L (ref 15–37)
BASOPHILS # BLD: 0 K/UL (ref 0–0.1)
BASOPHILS NFR BLD: 1 % (ref 0–1)
BILIRUB SERPL-MCNC: 0.6 MG/DL (ref 0.2–1)
BUN SERPL-MCNC: 15 MG/DL (ref 6–20)
BUN/CREAT SERPL: 13 (ref 12–20)
CALCIUM SERPL-MCNC: 9 MG/DL (ref 8.5–10.1)
CHLORIDE SERPL-SCNC: 102 MMOL/L (ref 97–108)
CO2 SERPL-SCNC: 25 MMOL/L (ref 21–32)
COMMENT:: NORMAL
CREAT SERPL-MCNC: 1.14 MG/DL (ref 0.55–1.02)
DIFFERENTIAL METHOD BLD: ABNORMAL
EKG ATRIAL RATE: 75 BPM
EKG DIAGNOSIS: NORMAL
EKG P AXIS: 89 DEGREES
EKG P-R INTERVAL: 342 MS
EKG Q-T INTERVAL: 402 MS
EKG QRS DURATION: 90 MS
EKG QTC CALCULATION (BAZETT): 448 MS
EKG R AXIS: 84 DEGREES
EKG T AXIS: 52 DEGREES
EKG VENTRICULAR RATE: 75 BPM
EOSINOPHIL # BLD: 0.1 K/UL (ref 0–0.4)
EOSINOPHIL NFR BLD: 2 % (ref 0–7)
ERYTHROCYTE [DISTWIDTH] IN BLOOD BY AUTOMATED COUNT: 13.1 % (ref 11.5–14.5)
GLOBULIN SER CALC-MCNC: 3.2 G/DL (ref 2–4)
GLUCOSE SERPL-MCNC: 115 MG/DL (ref 65–100)
HCT VFR BLD AUTO: 34.9 % (ref 35–47)
HGB BLD-MCNC: 11.8 G/DL (ref 11.5–16)
IMM GRANULOCYTES # BLD AUTO: 0 K/UL (ref 0–0.04)
IMM GRANULOCYTES NFR BLD AUTO: 1 % (ref 0–0.5)
LYMPHOCYTES # BLD: 1.7 K/UL (ref 0.8–3.5)
LYMPHOCYTES NFR BLD: 39 % (ref 12–49)
MAGNESIUM SERPL-MCNC: 2 MG/DL (ref 1.6–2.4)
MCH RBC QN AUTO: 35.4 PG (ref 26–34)
MCHC RBC AUTO-ENTMCNC: 33.8 G/DL (ref 30–36.5)
MCV RBC AUTO: 104.8 FL (ref 80–99)
MONOCYTES # BLD: 0.3 K/UL (ref 0–1)
MONOCYTES NFR BLD: 8 % (ref 5–13)
NEUTS SEG # BLD: 2.2 K/UL (ref 1.8–8)
NEUTS SEG NFR BLD: 49 % (ref 32–75)
NRBC # BLD: 0 K/UL (ref 0–0.01)
NRBC BLD-RTO: 0 PER 100 WBC
PLATELET # BLD AUTO: 170 K/UL (ref 150–400)
POTASSIUM SERPL-SCNC: 4.2 MMOL/L (ref 3.5–5.1)
PROT SERPL-MCNC: 7.2 G/DL (ref 6.4–8.2)
RBC # BLD AUTO: 3.33 M/UL (ref 3.8–5.2)
RBC MORPH BLD: ABNORMAL
SODIUM SERPL-SCNC: 133 MMOL/L (ref 136–145)
SPECIMEN HOLD: NORMAL
TROPONIN I SERPL HS-MCNC: 8 NG/L (ref 0–51)
WBC # BLD AUTO: 4.3 K/UL (ref 3.6–11)

## 2024-06-30 PROCEDURE — 6370000000 HC RX 637 (ALT 250 FOR IP): Performed by: EMERGENCY MEDICINE

## 2024-06-30 PROCEDURE — 96374 THER/PROPH/DIAG INJ IV PUSH: CPT

## 2024-06-30 PROCEDURE — 93010 ELECTROCARDIOGRAM REPORT: CPT | Performed by: INTERNAL MEDICINE

## 2024-06-30 PROCEDURE — 80053 COMPREHEN METABOLIC PANEL: CPT

## 2024-06-30 PROCEDURE — 99284 EMERGENCY DEPT VISIT MOD MDM: CPT

## 2024-06-30 PROCEDURE — 6360000002 HC RX W HCPCS: Performed by: EMERGENCY MEDICINE

## 2024-06-30 PROCEDURE — 96375 TX/PRO/DX INJ NEW DRUG ADDON: CPT

## 2024-06-30 PROCEDURE — 70450 CT HEAD/BRAIN W/O DYE: CPT

## 2024-06-30 PROCEDURE — 93005 ELECTROCARDIOGRAM TRACING: CPT | Performed by: EMERGENCY MEDICINE

## 2024-06-30 PROCEDURE — 83735 ASSAY OF MAGNESIUM: CPT

## 2024-06-30 PROCEDURE — 84484 ASSAY OF TROPONIN QUANT: CPT

## 2024-06-30 PROCEDURE — 85025 COMPLETE CBC W/AUTO DIFF WBC: CPT

## 2024-06-30 RX ORDER — DIPHENHYDRAMINE HYDROCHLORIDE 50 MG/ML
50 INJECTION INTRAMUSCULAR; INTRAVENOUS ONCE
Status: COMPLETED | OUTPATIENT
Start: 2024-06-30 | End: 2024-06-30

## 2024-06-30 RX ORDER — HYDRALAZINE HYDROCHLORIDE 20 MG/ML
5 INJECTION INTRAMUSCULAR; INTRAVENOUS ONCE
Status: COMPLETED | OUTPATIENT
Start: 2024-06-30 | End: 2024-06-30

## 2024-06-30 RX ORDER — KETOROLAC TROMETHAMINE 15 MG/ML
15 INJECTION, SOLUTION INTRAMUSCULAR; INTRAVENOUS ONCE
Status: COMPLETED | OUTPATIENT
Start: 2024-06-30 | End: 2024-06-30

## 2024-06-30 RX ORDER — PROCHLORPERAZINE EDISYLATE 5 MG/ML
10 INJECTION INTRAMUSCULAR; INTRAVENOUS ONCE
Status: COMPLETED | OUTPATIENT
Start: 2024-06-30 | End: 2024-06-30

## 2024-06-30 RX ORDER — DIPHENHYDRAMINE HYDROCHLORIDE 50 MG/ML
50 INJECTION INTRAMUSCULAR; INTRAVENOUS ONCE
OUTPATIENT
Start: 2024-06-30 | End: 2024-06-30

## 2024-06-30 RX ORDER — HYDRALAZINE HYDROCHLORIDE 25 MG/1
25 TABLET, FILM COATED ORAL
Status: COMPLETED | OUTPATIENT
Start: 2024-06-30 | End: 2024-06-30

## 2024-06-30 RX ADMIN — PROCHLORPERAZINE EDISYLATE 10 MG: 5 INJECTION INTRAMUSCULAR; INTRAVENOUS at 01:45

## 2024-06-30 RX ADMIN — DIPHENHYDRAMINE HYDROCHLORIDE 50 MG: 50 INJECTION INTRAMUSCULAR; INTRAVENOUS at 01:52

## 2024-06-30 RX ADMIN — HYDRALAZINE HYDROCHLORIDE 25 MG: 25 TABLET ORAL at 01:40

## 2024-06-30 RX ADMIN — KETOROLAC TROMETHAMINE 15 MG: 15 INJECTION, SOLUTION INTRAMUSCULAR; INTRAVENOUS at 01:52

## 2024-06-30 RX ADMIN — HYDRALAZINE HYDROCHLORIDE 5 MG: 20 INJECTION INTRAMUSCULAR; INTRAVENOUS at 02:30

## 2024-06-30 ASSESSMENT — PAIN DESCRIPTION - DESCRIPTORS: DESCRIPTORS: ACHING

## 2024-06-30 ASSESSMENT — PAIN DESCRIPTION - LOCATION: LOCATION: HEAD

## 2024-06-30 ASSESSMENT — PAIN SCALES - GENERAL: PAINLEVEL_OUTOF10: 5

## 2024-06-30 ASSESSMENT — PAIN - FUNCTIONAL ASSESSMENT: PAIN_FUNCTIONAL_ASSESSMENT: 0-10

## 2024-06-30 NOTE — ED NOTES
Discharge papers reviewed with patient and family members. Patient verbalized understanding and denied having any questions at this time. IV removed w/o complication. Patient left in good/stable condition

## 2024-06-30 NOTE — ED PROVIDER NOTES
care physician and/or specialist(s). Discussed signs or symptoms that would warrant return back to the ER for further evaluation. The patient is agreeable with discharge.    Amount and/or Complexity of Data Reviewed  Labs: ordered.  Radiology: ordered.  ECG/medicine tests: ordered.    Risk  Prescription drug management.         EKG: All EKG's are interpreted by the Emergency Department Physician who either signs or Co-signs this chart in the absence of a cardiologist.    ED Course as of 06/30/24 0335   Sun Jun 30, 2024   0237 EKG shows sinus rhythm at a rate of 73, normal intervals, normal axis, no STEMI. [RD]      ED Course User Index  [RD] Rickey Au MD       CRITICAL CARE TIME   Total Critical Care time was 0 minutes, excluding separately reportable procedures.  There was a high probability of clinically significant/life threatening deterioration in the patient's condition which required my urgent intervention.     CONSULTS:  None    PROCEDURES:  Unless otherwise noted below, none     Procedures    FINAL IMPRESSION      1. Nonintractable headache, unspecified chronicity pattern, unspecified headache type    2. Hypertensive urgency          DISPOSITION/PLAN   DISPOSITION Decision To Discharge 06/30/2024 03:16:40 AM    PATIENT REFERRED TO:  Edil Campoverde, APRN - NP  [address] [facility] [address] [phone]    Schedule an appointment as soon as possible for a visit       [facility] EMERGENCY DEPT  [address] [place] [id number]  [phone]    As needed, If symptoms worsen      DISCHARGE MEDICATIONS:  Discharge Medication List as of 6/30/2024  3:16 AM        Controlled Substances Monitoring:          No data to display                (Please note that portions of this note were completed with a voice recognition program.  Efforts were made to edit the dictations but occasionally words are mis-transcribed.)    Rickey Au MD (electronically signed)  Attending Emergency

## 2024-06-30 NOTE — ED TRIAGE NOTES
Patient arrives ambulatory with complaints of headache and high blood pressure     Patient reports last night she had ocular migraine, blurred vision, and headache     Same symptoms started tonight, took tylenol around 9 pm, ativan around 10 pm, no relief for headache and had an alert from monitor for high blood pressure, took prescribed blood pressure medications     Denies blurred vision, chest pain, or shortness of breath at time of triage

## 2024-07-08 ENCOUNTER — HOSPITAL ENCOUNTER (EMERGENCY)
Age: 84
Discharge: HOME | End: 2024-07-08
Attending: FAMILY MEDICINE
Payer: MEDICARE

## 2024-07-08 VITALS
OXYGEN SATURATION: 96 % | RESPIRATION RATE: 12 BRPM | SYSTOLIC BLOOD PRESSURE: 155 MMHG | HEIGHT: 65 IN | DIASTOLIC BLOOD PRESSURE: 71 MMHG | TEMPERATURE: 98 F | BODY MASS INDEX: 32.49 KG/M2 | WEIGHT: 195 LBS | HEART RATE: 77 BPM

## 2024-07-08 DIAGNOSIS — S51.812A SKIN TEAR OF FOREARM WITHOUT COMPLICATION, LEFT, INITIAL ENCOUNTER: Primary | ICD-10-CM

## 2024-07-08 PROCEDURE — 99282 EMERGENCY DEPT VISIT SF MDM: CPT

## 2024-07-08 RX ORDER — GINSENG 100 MG
CAPSULE ORAL
Status: DISCONTINUED | OUTPATIENT
Start: 2024-07-08 | End: 2024-07-08 | Stop reason: HOSPADM

## 2024-07-08 NOTE — ED PROVIDER NOTES
read by the radiologist. Plain radiographic images are visualized and preliminarily interpreted by the emergency physician with the below findings:        Interpretation per the Radiologist below, if available at the time of this note:    No orders to display         ED BEDSIDE ULTRASOUND:   Performed by ED Physician - none    LABS:  Labs Reviewed - No data to display    All other labs were within normal range or not returned as of this dictation.    EMERGENCY DEPARTMENT COURSE and DIFFERENTIAL DIAGNOSIS/MDM:   Vitals:    Vitals:    07/08/24 1116   Height: 1.626 m (5' 4\")         Medical Decision Making  Skin tear.  Low suspicion for fracture since no pain able to flex and extend I did approximate the skin tissue.  Will put some bacitracin and a nonstick bandage patient has an appointment coming up with a primary care doctor, next week patient is elected not to get her tetanus vaccine up-to-date.  Given return precautions.    Risk  OTC drugs.            REASSESSMENT              CONSULTS:  None    PROCEDURES:  Unless otherwise noted below, none     Procedures        FINAL IMPRESSION      1. Skin tear of forearm without complication, left, initial encounter          DISPOSITION/PLAN   DISPOSITION Decision To Discharge 07/08/2024 11:36:13 AM      PATIENT REFERRED TO:  Edil Campoverde, ESTEFANÍA - NP  [address] [id number] [phone]    Schedule an appointment as soon as possible for a visit         DISCHARGE MEDICATIONS:  New Prescriptions    No medications on file     Controlled Substances Monitoring:          No data to display                (Please note that portions of this note were completed with a voice recognition program.  Efforts were made to edit the dictations but occasionally words are mis-transcribed.)    Kodak Elizondo DO (electronically signed)  Attending Emergency Physician           Kodak Elizondo DO  07/08/24 1137

## 2024-07-08 NOTE — DISCHARGE INSTRUCTIONS
As we spoke, use nonstick bandages.  Watch for signs of infection process including increased redness and tenderness.  Watch for purulent discharge if any of these appear.  Return to the emergency department or follow-up with your primary care provider.  Use some over-the-counter antibacterial cream such as Neosporin or bacitracin and a nonstick bandage.  Tylenol for any pains.  Touch base with her primary care provider regarding your tetanus, as you have elected not to receive this today.

## 2024-07-08 NOTE — ED TRIAGE NOTES
Reports that she was leaving [facility] and one of the automatic doors caught her arm. She reports that her tetanus is current.

## 2024-09-09 NOTE — PROGRESS NOTES
5 MS DISCHARGE    D: Patient discharged to group home at 12:00 pm. Patient accompanied by EMS, handout and report given to EMS.    I: Discharge medications and instructions reviewed with group home staff. Other phone numbers to call with questions or concerns after discharge reviewed. PIV removed. Education completed.    A: Staff verbalized understanding of discharge medications and instructions. Belongings returned to patient.    P: Patient will be admitted to hospice care at [facility].              PT DAILY TREATMENT NOTE - [facility] 2-15    Patient Name: Tim Ruelas  MVNF:  : 1940  [x]  Patient  Verified  [place]or: [place] MED[place]ARE / Plan: [place] MEDICARE PART A & B / Product Type: Medicare /    In DTRF:9151  Out time:1030  Total Treatment Time (min): 59  Total Timed Codes (min): 30  1:1 Treatment Time ( only): 30   Visit #:  2    Treatment Area: Pain in left hip [M25.552]  Sacroiliitis ([address] [M46.1]    SUBJECTIVE  Pain Level (0-10 scale): 3/10  Any medication changes, allergies to medications, adverse drug reactions, diagnosis change, or new procedure performed?: [x] No    [] Yes (see summary sheet for update)  Subjective functional status/changes: \"Everything is about the same. \"    OBJECTIVE    Modality rationale: decrease edema, decrease inflammation, decrease pain, increase tissue extensibility and increase muscle contraction/control to improve the patients ability to complete ADLs with less pain   Min Type Additional Details       [] Estim: []Att   []Unatt    []TENS instruct                  []IFC  []Premod   []NMES                    []Other:  []w/US      []w/ heat  []w/ ice  Position:  Location:       []  Traction: [] Cervical       []Lumbar                       [] Prone          []Supine                       []Intermittent   []Continuous Lbs:  [] before manual  [] after manual  [] w/ heat  [] Simultaneously performed with w/ Estim    []  Ultrasound: []Continuous   [] Pulsed                       at: []1MHz   []3MHz Location:  W/cm2:    [] Paraffin         Location:   []w/heat    []  Ice     []  Heat  []  Ice massage Position:  Location:    []  Laser  []  Other: Position:  Location:      []  Vasopneumatic Device Pressure:       [] lo [] med [] hi   [] w/ ice      Temperature:   [] Simultaneously performed with w/ Estim     [x] Skin assessment post-treatment:  [x]intact []redness- no adverse reaction     []redness - adverse reaction:     30 min Therapeutic Exercise:  [x] See flow sheet : Rationale: increase ROM, increase strength and improve coordination to improve the patients ability to complete ADLs with less pain      15 min Group Therapy:  [x] See flow sheet : while completing ther ex   Rationale: increase ROM, increase strength and improve coordination to improve the patients ability to complete ADLs with less pain    With   [x] TE   [] TA   [] neuro   [] other: Patient Education: [x] Review HEP    [] Progressed/Changed HEP based on:   [] positioning   [x] body mechanics   [] transfers   [] heat/ice application    [] other:      Other Objective/Functional Measures: addition of rom, strengthening, and core stabilization exercises    Pain Level (0-10 scale) post treatment: 1/10    ASSESSMENT/Changes in Function:   The pt tolerated treatment well today. Provided HEP with pictures to facilitate compliance. Increased cues provided for pt to perform proper technique due to first time completing exercises. Pt reports being somewhat anxious for therapy, so slow pace with thorough explanations are helpful for compliance. Patient will continue to benefit from skilled PT services to modify and progress therapeutic interventions, address functional mobility deficits, address ROM deficits, address strength deficits, analyze and address soft tissue restrictions, analyze and cue movement patterns, analyze and modify body mechanics/ergonomics and assess and modify postural abnormalities to attain remaining goals. [x]  See Plan of Care  []  See progress note/recertification  []  See Discharge Summary         Progress towards goals / Updated goals:  Short Term Goals: To be accomplished in 5 treatments. 1. Patient will demonstrate independence and compliance with HEP in order to assist with carryover from PT services. 2.   Patient will be able to sit on the commode without pain greater than or equal to 3/10 to increase functional mobility.   3.   Patient will be able to return to walking for 30 minutes without pain greater than or equal to 1/10 to increase functional mobility.     Long Term Goals: To be accomplished in 10 treatments. 1.   Patient will be able to ambulate with increased stability per pt report to increase functional mobility. 2.   Patient will increase left hip flexion mmt to 5/5 in order to complete all ADLs without fear of falling.     PLAN  [x]  Upgrade activities as tolerated     [x]  Continue plan of care  []  Update interventions per flow sheet       []  Discharge due to:_  []  Other:_      Wilber Avalos, PT, DPT  6/15/2022

## 2024-10-01 ENCOUNTER — HOSPITAL ENCOUNTER (EMERGENCY)
Facility: HOSPITAL | Age: 84
Discharge: HOME | End: 2024-10-01
Attending: EMERGENCY MEDICINE
Payer: MEDICARE

## 2024-10-01 ENCOUNTER — HOSPITAL ENCOUNTER (EMERGENCY)
Facility: HOSPITAL | Age: 84
Discharge: HOME | End: 2024-10-03
Attending: EMERGENCY MEDICINE
Payer: MEDICARE

## 2024-10-01 VITALS
RESPIRATION RATE: 20 BRPM | DIASTOLIC BLOOD PRESSURE: 88 MMHG | OXYGEN SATURATION: 100 % | HEART RATE: 76 BPM | TEMPERATURE: 97.6 F | SYSTOLIC BLOOD PRESSURE: 158 MMHG

## 2024-10-01 DIAGNOSIS — I83.811 VARICOSE VEINS OF RIGHT LOWER EXTREMITY WITH PAIN: Primary | ICD-10-CM

## 2024-10-01 PROCEDURE — 93971 EXTREMITY STUDY: CPT

## 2024-10-01 PROCEDURE — 99284 EMERGENCY DEPT VISIT MOD MDM: CPT

## 2024-10-01 ASSESSMENT — PAIN - FUNCTIONAL ASSESSMENT
PAIN_FUNCTIONAL_ASSESSMENT: 0-10
PAIN_FUNCTIONAL_ASSESSMENT: NONE - DENIES PAIN

## 2024-10-01 ASSESSMENT — PAIN SCALES - GENERAL: PAINLEVEL_OUTOF10: 5

## 2024-10-01 ASSESSMENT — PAIN DESCRIPTION - ORIENTATION: ORIENTATION: RIGHT

## 2024-10-01 ASSESSMENT — PAIN DESCRIPTION - DESCRIPTORS: DESCRIPTORS: ACHING

## 2024-10-01 ASSESSMENT — PAIN DESCRIPTION - LOCATION: LOCATION: LEG

## 2024-10-01 NOTE — ED PROVIDER NOTES
[facility] EMERGENCY DEPT  EMERGENCY DEPARTMENT HISTORY AND PHYSICAL EXAM      Date: 10/1/2024  Patient Name: Latosha Todd  MRN: [id number]  Birthdate 1940  Date of evaluation: 10/1/2024  Provider: Caty Hector MD   Note Started: 1:57 PM EDT 10/1/24    HISTORY OF PRESENT ILLNESS     Chief Complaint   Patient presents with    Leg Pain       History Provided By: Patient    HPI: Latosha Todd is a 83 y.o. female with PMH DVT, paroxysmal A-fib on Eliquis, left Baker's cyst presenting with leg pain.  Patient has had few weeks of right leg and thigh pain.  Had negative x-ray at orthopedic office.  States pain is medial aspect of left thigh as well as lateral aspect.  Had bruising on the medial aspect.  States she had DVT 4 years ago that was similar.  Denies chest pain or shortness of breath or dizziness.    PAST MEDICAL HISTORY   Past Medical History:  Past Medical History:   Diagnosis Date    Acute deep vein thrombosis (DVT) of distal vein of right lower extremity (HCC) 6/16/2020    Baker's cyst of knee     Left    Easy bruising     Essential hypertension 6/16/2020    Hypercholesteremia     Hypertension     Left arm pain     Neck pain     Osteoarthritis of both knees     Rapid pulse     Right elbow pain     Right hand pain     Right lateral epicondylitis     Swelling of joint of left wrist     Thoracic kyphosis     Cervical muscle stiffness    Wears glasses        Past Surgical History:  History reviewed. No pertinent surgical history.    Family History:  Family History   Problem Relation Age of Onset    Heart Disease Son        Social History:  Social History     Tobacco Use    Smoking status: Never    Smokeless tobacco: Never   Substance Use Topics    Alcohol use: Yes     Comment: socially    Drug use: Never       Allergies:  Allergies   Allergen Reactions    Morphine      Other reaction(s): Not Reported This Time    Procaine Hives and Other (See Comments)     Fast heartbeat    Prochlorperazine Other (See

## 2024-10-01 NOTE — DISCHARGE INSTRUCTIONS
If a prescription has been provided, please fill it as soon as possible to prevent a delay in treatment. If you have any questions or reservations about taking the medication due to side effects or interactions with other medications, please call your primary care provider or contact us directly.  Again, THANK YOU for choosing us to care for YOU!

## 2024-10-03 ENCOUNTER — TELEPHONE (OUTPATIENT)
Age: 84
End: 2024-10-03

## 2024-11-01 ENCOUNTER — OFFICE VISIT (OUTPATIENT)
Age: 84
End: 2024-11-01

## 2024-11-01 VITALS
OXYGEN SATURATION: 95 % | WEIGHT: 186.6 LBS | DIASTOLIC BLOOD PRESSURE: 60 MMHG | BODY MASS INDEX: 31.86 KG/M2 | HEART RATE: 67 BPM | SYSTOLIC BLOOD PRESSURE: 122 MMHG | TEMPERATURE: 97.6 F | HEIGHT: 64 IN

## 2024-11-01 DIAGNOSIS — H53.8 BLURRING OF VISION: Primary | ICD-10-CM

## 2024-11-01 DIAGNOSIS — R51.9 NEW ONSET HEADACHE: ICD-10-CM

## 2024-11-01 RX ORDER — VIT C/B6/B5/MAGNESIUM/HERB 173 50-5-6-5MG
CAPSULE ORAL DAILY
COMMUNITY

## 2024-11-01 RX ORDER — GUSELKUMAB 100 MG/ML
INJECTION SUBCUTANEOUS
COMMUNITY

## 2024-11-01 RX ORDER — NICOTINE POLACRILEX 2 MG
GUM BUCCAL
COMMUNITY

## 2024-11-01 RX ORDER — LISINOPRIL 10 MG/1
10 TABLET ORAL DAILY
COMMUNITY
Start: 2024-09-18

## 2024-11-01 ASSESSMENT — ENCOUNTER SYMPTOMS
BACK PAIN: 0
SHORTNESS OF BREATH: 0
COUGH: 0
NAUSEA: 1
VOMITING: 0

## 2024-11-01 NOTE — PROGRESS NOTES
protocol  tailored for this examination and automatic exposure control for dose  modulation.     FINDINGS: There is no acute intracranial hemorrhage, mass, mass effect or  herniation. Ventricular system is normal. There are bilateral white matter  hypodensities consistent with chronic microvascular ischemic changes. There is  no evidence of acute territorial infarct. The mastoid air cells are well  pneumatized. The visualized paranasal sinuses are normal.     IMPRESSION:  No acute intracranial hemorrhage, mass or infarct.          Assessment:  1. Blurring of vision: Migraine aura without headache  - MRI BRAIN WO CONTRAST; Future  - MRA HEAD WO CONTRAST; Future  - MRA NECK WO CONTRAST; Future  2.  Migraine headache with aura  - MRI BRAIN WO CONTRAST; Future  - MRA HEAD WO CONTRAST; Future  - MRA NECK WO CONTRAST; Future  Patient has intermittent unilateral blurry vision since 2019.  Episodes lasting for 20 minutes; might see flickers of light.  No complete loss of vision.  Possibl combination of migraine aura without headache.  Headaches mostly in the course of severe hypertension.  Her blood pressure tends to run high.  Taking her blood pressure medications.  Reviewed her CT scan of the brain from June 2024 which was unremarkable; no mass lesions.  She had no history of migraine headaches when she was younger.  New onset migraine headache or migraine aura without headache in the absence of past history is less likely in her age group.  Need to rule out vascular possibilities.  Will get MRI of the brain without contrast.  Will do MRA of the head and neck.  Since episodes are infrequent, and does not need present medications.  He will continue with Tylenol as needed.  To follow closely with her primary care provider for blood pressure management.  Follow-up in 4 months time but will call her with results of her MRIs.        PLEASE NOTE:   This document has been produced using voice recognition software. Unrecognized

## 2024-12-09 ENCOUNTER — HOSPITAL ENCOUNTER (OUTPATIENT)
Facility: HOSPITAL | Age: 84
Discharge: HOME | End: 2024-12-12
Payer: MEDICARE

## 2024-12-09 DIAGNOSIS — R51.9 NEW ONSET HEADACHE: ICD-10-CM

## 2024-12-09 DIAGNOSIS — H53.8 BLURRING OF VISION: ICD-10-CM

## 2024-12-09 PROCEDURE — 70551 MRI BRAIN STEM W/O DYE: CPT

## 2024-12-09 PROCEDURE — 70547 MR ANGIOGRAPHY NECK W/O DYE: CPT

## 2024-12-09 PROCEDURE — 70544 MR ANGIOGRAPHY HEAD W/O DYE: CPT

## 2024-12-19 ENCOUNTER — APPOINTMENT (OUTPATIENT)
Facility: HOSPITAL | Age: 84
DRG: 078 | End: 2024-12-19
Payer: MEDICARE

## 2024-12-19 ENCOUNTER — HOSPITAL ENCOUNTER (INPATIENT)
Facility: HOSPITAL | Age: 84
LOS: 1 days | Discharge: HOME | DRG: 078 | End: 2024-12-22
Attending: STUDENT IN AN ORGANIZED HEALTH CARE EDUCATION/TRAINING PROGRAM | Admitting: INTERNAL MEDICINE
Payer: MEDICARE

## 2024-12-19 DIAGNOSIS — R47.9 SPEECH DISTURBANCE, UNSPECIFIED TYPE: ICD-10-CM

## 2024-12-19 DIAGNOSIS — I50.31 ACUTE DIASTOLIC CONGESTIVE HEART FAILURE (HCC): ICD-10-CM

## 2024-12-19 DIAGNOSIS — R25.1 TREMOR OF BOTH HANDS: ICD-10-CM

## 2024-12-19 DIAGNOSIS — I16.0 HYPERTENSIVE URGENCY: Primary | ICD-10-CM

## 2024-12-19 DIAGNOSIS — R79.89 ELEVATED BRAIN NATRIURETIC PEPTIDE (BNP) LEVEL: ICD-10-CM

## 2024-12-19 DIAGNOSIS — R06.02 SHORTNESS OF BREATH: ICD-10-CM

## 2024-12-19 LAB
ALBUMIN SERPL-MCNC: 3.9 G/DL (ref 3.5–5)
ALBUMIN/GLOB SERPL: 1.1 (ref 1.1–2.2)
ALP SERPL-CCNC: 81 U/L (ref 45–117)
ALT SERPL-CCNC: 18 U/L (ref 12–78)
ANION GAP SERPL CALC-SCNC: 7 MMOL/L (ref 2–12)
APPEARANCE UR: CLEAR
AST SERPL-CCNC: 21 U/L (ref 15–37)
BACTERIA URNS QL MICRO: NEGATIVE /HPF
BASOPHILS # BLD: 0 K/UL (ref 0–0.1)
BASOPHILS NFR BLD: 1 % (ref 0–1)
BILIRUB SERPL-MCNC: 0.6 MG/DL (ref 0.2–1)
BILIRUB UR QL: NEGATIVE
BUN SERPL-MCNC: 22 MG/DL (ref 6–20)
BUN/CREAT SERPL: 16 (ref 12–20)
CALCIUM SERPL-MCNC: 9.4 MG/DL (ref 8.5–10.1)
CHLORIDE SERPL-SCNC: 101 MMOL/L (ref 97–108)
CO2 SERPL-SCNC: 28 MMOL/L (ref 21–32)
COLOR UR: ABNORMAL
CREAT SERPL-MCNC: 1.35 MG/DL (ref 0.55–1.02)
DIFFERENTIAL METHOD BLD: ABNORMAL
EOSINOPHIL # BLD: 0 K/UL (ref 0–0.4)
EOSINOPHIL NFR BLD: 1 % (ref 0–7)
EPITH CASTS URNS QL MICRO: ABNORMAL /LPF
ERYTHROCYTE [DISTWIDTH] IN BLOOD BY AUTOMATED COUNT: 13.2 % (ref 11.5–14.5)
GLOBULIN SER CALC-MCNC: 3.6 G/DL (ref 2–4)
GLUCOSE SERPL-MCNC: 114 MG/DL (ref 65–100)
GLUCOSE UR STRIP.AUTO-MCNC: NEGATIVE MG/DL
HCT VFR BLD AUTO: 34.1 % (ref 35–47)
HGB BLD-MCNC: 11.1 G/DL (ref 11.5–16)
HGB UR QL STRIP: NEGATIVE
HYALINE CASTS URNS QL MICRO: ABNORMAL /LPF (ref 0–2)
IMM GRANULOCYTES # BLD AUTO: 0.1 K/UL (ref 0–0.04)
IMM GRANULOCYTES NFR BLD AUTO: 2 % (ref 0–0.5)
KETONES UR QL STRIP.AUTO: ABNORMAL MG/DL
LEUKOCYTE ESTERASE UR QL STRIP.AUTO: ABNORMAL
LYMPHOCYTES # BLD: 0.9 K/UL (ref 0.8–3.5)
LYMPHOCYTES NFR BLD: 23 % (ref 12–49)
MCH RBC QN AUTO: 34.7 PG (ref 26–34)
MCHC RBC AUTO-ENTMCNC: 32.6 G/DL (ref 30–36.5)
MCV RBC AUTO: 106.6 FL (ref 80–99)
MONOCYTES # BLD: 0.4 K/UL (ref 0–1)
MONOCYTES NFR BLD: 10 % (ref 5–13)
NEUTS SEG # BLD: 2.6 K/UL (ref 1.8–8)
NEUTS SEG NFR BLD: 63 % (ref 32–75)
NITRITE UR QL STRIP.AUTO: NEGATIVE
NRBC # BLD: 0 K/UL (ref 0–0.01)
NRBC BLD-RTO: 0 PER 100 WBC
NT PRO BNP: 1416 PG/ML
PH UR STRIP: 6 (ref 5–8)
PLATELET # BLD AUTO: 216 K/UL (ref 150–400)
PMV BLD AUTO: 12.2 FL (ref 8.9–12.9)
POTASSIUM SERPL-SCNC: 3.3 MMOL/L (ref 3.5–5.1)
PROT SERPL-MCNC: 7.5 G/DL (ref 6.4–8.2)
PROT UR STRIP-MCNC: NEGATIVE MG/DL
RBC # BLD AUTO: 3.2 M/UL (ref 3.8–5.2)
RBC #/AREA URNS HPF: ABNORMAL /HPF (ref 0–5)
SODIUM SERPL-SCNC: 136 MMOL/L (ref 136–145)
SP GR UR REFRACTOMETRY: 1.01 (ref 1–1.03)
TROPONIN I SERPL HS-MCNC: 27 NG/L (ref 0–51)
URINE CULTURE IF INDICATED: ABNORMAL
UROBILINOGEN UR QL STRIP.AUTO: 0.2 EU/DL (ref 0.2–1)
WBC # BLD AUTO: 4.1 K/UL (ref 3.6–11)
WBC URNS QL MICRO: ABNORMAL /HPF (ref 0–4)

## 2024-12-19 PROCEDURE — 71046 X-RAY EXAM CHEST 2 VIEWS: CPT

## 2024-12-19 PROCEDURE — 36415 COLL VENOUS BLD VENIPUNCTURE: CPT

## 2024-12-19 PROCEDURE — 85025 COMPLETE CBC W/AUTO DIFF WBC: CPT

## 2024-12-19 PROCEDURE — 70450 CT HEAD/BRAIN W/O DYE: CPT

## 2024-12-19 PROCEDURE — 94761 N-INVAS EAR/PLS OXIMETRY MLT: CPT

## 2024-12-19 PROCEDURE — 99285 EMERGENCY DEPT VISIT HI MDM: CPT

## 2024-12-19 PROCEDURE — 6370000000 HC RX 637 (ALT 250 FOR IP): Performed by: STUDENT IN AN ORGANIZED HEALTH CARE EDUCATION/TRAINING PROGRAM

## 2024-12-19 PROCEDURE — 84484 ASSAY OF TROPONIN QUANT: CPT

## 2024-12-19 PROCEDURE — 81001 URINALYSIS AUTO W/SCOPE: CPT

## 2024-12-19 PROCEDURE — 83880 ASSAY OF NATRIURETIC PEPTIDE: CPT

## 2024-12-19 PROCEDURE — 80053 COMPREHEN METABOLIC PANEL: CPT

## 2024-12-19 PROCEDURE — 93005 ELECTROCARDIOGRAM TRACING: CPT | Performed by: STUDENT IN AN ORGANIZED HEALTH CARE EDUCATION/TRAINING PROGRAM

## 2024-12-19 RX ORDER — LORAZEPAM 1 MG/1
1 TABLET ORAL
Status: COMPLETED | OUTPATIENT
Start: 2024-12-19 | End: 2024-12-19

## 2024-12-19 RX ORDER — METOPROLOL SUCCINATE 25 MG/1
25 TABLET, EXTENDED RELEASE ORAL
Status: COMPLETED | OUTPATIENT
Start: 2024-12-19 | End: 2024-12-19

## 2024-12-19 RX ADMIN — METOPROLOL SUCCINATE 25 MG: 25 TABLET, EXTENDED RELEASE ORAL at 22:29

## 2024-12-19 RX ADMIN — LORAZEPAM 1 MG: 1 TABLET ORAL at 22:30

## 2024-12-19 ASSESSMENT — PAIN - FUNCTIONAL ASSESSMENT: PAIN_FUNCTIONAL_ASSESSMENT: NONE - DENIES PAIN

## 2024-12-20 ENCOUNTER — APPOINTMENT (OUTPATIENT)
Facility: HOSPITAL | Age: 84
DRG: 078 | End: 2024-12-20
Attending: INTERNAL MEDICINE
Payer: MEDICARE

## 2024-12-20 PROBLEM — G25.0 BENIGN ESSENTIAL TREMOR: Status: ACTIVE | Noted: 2024-12-20

## 2024-12-20 PROBLEM — I16.1 HYPERTENSIVE EMERGENCY: Status: ACTIVE | Noted: 2024-12-20

## 2024-12-20 PROBLEM — N18.31 CKD STAGE 3A, GFR 45-59 ML/MIN (HCC): Status: ACTIVE | Noted: 2024-12-20

## 2024-12-20 PROBLEM — E78.5 HYPERLIPIDEMIA: Status: ACTIVE | Noted: 2024-12-20

## 2024-12-20 PROBLEM — I48.0 PAF (PAROXYSMAL ATRIAL FIBRILLATION) (HCC): Status: ACTIVE | Noted: 2020-11-08

## 2024-12-20 LAB
ANION GAP SERPL CALC-SCNC: 6 MMOL/L (ref 2–12)
BUN SERPL-MCNC: 21 MG/DL (ref 6–20)
BUN/CREAT SERPL: 18 (ref 12–20)
CALCIUM SERPL-MCNC: 9.5 MG/DL (ref 8.5–10.1)
CHLORIDE SERPL-SCNC: 103 MMOL/L (ref 97–108)
CHOLEST SERPL-MCNC: 156 MG/DL
CO2 SERPL-SCNC: 28 MMOL/L (ref 21–32)
CREAT SERPL-MCNC: 1.2 MG/DL (ref 0.55–1.02)
ECHO AO ARCH DIAM: 2.3 CM
ECHO AO ASC DIAM: 3.4 CM
ECHO AO ASCENDING AORTA INDEX: 1.73 CM/M2
ECHO AO ROOT DIAM: 3.5 CM
ECHO AO ROOT INDEX: 1.79 CM/M2
ECHO AV AREA PEAK VELOCITY: 2.5 CM2
ECHO AV AREA VTI: 2.4 CM2
ECHO AV AREA/BSA PEAK VELOCITY: 1.3 CM2/M2
ECHO AV AREA/BSA VTI: 1.2 CM2/M2
ECHO AV MEAN GRADIENT: 4 MMHG
ECHO AV MEAN VELOCITY: 1 M/S
ECHO AV PEAK GRADIENT: 7 MMHG
ECHO AV PEAK VELOCITY: 1.3 M/S
ECHO AV VELOCITY RATIO: 0.77
ECHO AV VTI: 34 CM
ECHO BSA: 2.03 M2
ECHO EST RA PRESSURE: 3 MMHG
ECHO LA DIAMETER INDEX: 1.73 CM/M2
ECHO LA DIAMETER: 3.4 CM
ECHO LA TO AORTIC ROOT RATIO: 0.97
ECHO LA VOL A-L A2C: 63 ML (ref 22–52)
ECHO LA VOL A-L A4C: 48 ML (ref 22–52)
ECHO LA VOL BP: 48 ML (ref 22–52)
ECHO LA VOL MOD A2C: 59 ML (ref 22–52)
ECHO LA VOL MOD A4C: 39 ML (ref 22–52)
ECHO LA VOL/BSA BIPLANE: 24 ML/M2 (ref 16–34)
ECHO LA VOLUME AREA LENGTH: 55 ML
ECHO LA VOLUME INDEX A-L A2C: 32 ML/M2 (ref 16–34)
ECHO LA VOLUME INDEX A-L A4C: 24 ML/M2 (ref 16–34)
ECHO LA VOLUME INDEX AREA LENGTH: 28 ML/M2 (ref 16–34)
ECHO LA VOLUME INDEX MOD A2C: 30 ML/M2 (ref 16–34)
ECHO LA VOLUME INDEX MOD A4C: 20 ML/M2 (ref 16–34)
ECHO LV E' LATERAL VELOCITY: 8.92 CM/S
ECHO LV E' SEPTAL VELOCITY: 4.79 CM/S
ECHO LV EDV A2C: 52 ML
ECHO LV EDV A4C: 74 ML
ECHO LV EDV BP: 69 ML (ref 56–104)
ECHO LV EDV INDEX A4C: 38 ML/M2
ECHO LV EDV INDEX BP: 35 ML/M2
ECHO LV EDV NDEX A2C: 27 ML/M2
ECHO LV EF PHYSICIAN: 60 %
ECHO LV EJECTION FRACTION A2C: 54 %
ECHO LV EJECTION FRACTION A4C: 54 %
ECHO LV ESV A2C: 24 ML
ECHO LV ESV A4C: 34 ML
ECHO LV ESV BP: 32 ML (ref 19–49)
ECHO LV ESV INDEX A2C: 12 ML/M2
ECHO LV ESV INDEX A4C: 17 ML/M2
ECHO LV ESV INDEX BP: 16 ML/M2
ECHO LV FRACTIONAL SHORTENING: 26 % (ref 28–44)
ECHO LV INTERNAL DIMENSION DIASTOLE INDEX: 1.94 CM/M2
ECHO LV INTERNAL DIMENSION DIASTOLIC: 3.8 CM (ref 3.9–5.3)
ECHO LV INTERNAL DIMENSION SYSTOLIC INDEX: 1.43 CM/M2
ECHO LV INTERNAL DIMENSION SYSTOLIC: 2.8 CM
ECHO LV IVSD: 0.7 CM (ref 0.6–0.9)
ECHO LV MASS 2D: 65.3 G (ref 67–162)
ECHO LV MASS INDEX 2D: 33.3 G/M2 (ref 43–95)
ECHO LV POSTERIOR WALL DIASTOLIC: 0.6 CM (ref 0.6–0.9)
ECHO LV RELATIVE WALL THICKNESS RATIO: 0.32
ECHO LVOT AREA: 3.5 CM2
ECHO LVOT AV VTI INDEX: 0.71
ECHO LVOT DIAM: 2.1 CM
ECHO LVOT MEAN GRADIENT: 2 MMHG
ECHO LVOT PEAK GRADIENT: 4 MMHG
ECHO LVOT PEAK VELOCITY: 1 M/S
ECHO LVOT STROKE VOLUME INDEX: 42.4 ML/M2
ECHO LVOT SV: 83.1 ML
ECHO LVOT VTI: 24 CM
ECHO MV A VELOCITY: 0.72 M/S
ECHO MV E DECELERATION TIME (DT): 214.2 MS
ECHO MV E VELOCITY: 0.68 M/S
ECHO MV E/A RATIO: 0.94
ECHO MV E/E' LATERAL: 7.62
ECHO MV E/E' RATIO (AVERAGED): 10.91
ECHO MV E/E' SEPTAL: 14.2
ECHO PV MAX VELOCITY: 0.9 M/S
ECHO PV PEAK GRADIENT: 3 MMHG
ECHO RIGHT VENTRICULAR SYSTOLIC PRESSURE (RVSP): 22 MMHG
ECHO RV FREE WALL PEAK S': 8.4 CM/S
ECHO RV INTERNAL DIMENSION: 3.7 CM
ECHO RV TAPSE: 2 CM (ref 1.7–?)
ECHO TV REGURGITANT MAX VELOCITY: 2.19 M/S
ECHO TV REGURGITANT PEAK GRADIENT: 19 MMHG
EKG ATRIAL RATE: 87 BPM
EKG DIAGNOSIS: NORMAL
EKG P AXIS: 82 DEGREES
EKG P-R INTERVAL: 250 MS
EKG Q-T INTERVAL: 384 MS
EKG QRS DURATION: 84 MS
EKG QTC CALCULATION (BAZETT): 462 MS
EKG R AXIS: 62 DEGREES
EKG T AXIS: 21 DEGREES
EKG VENTRICULAR RATE: 87 BPM
EST. AVERAGE GLUCOSE BLD GHB EST-MCNC: 105 MG/DL
GLUCOSE SERPL-MCNC: 110 MG/DL (ref 65–100)
HBA1C MFR BLD: 5.3 % (ref 4–5.6)
HDLC SERPL-MCNC: 47 MG/DL
HDLC SERPL: 3.3 (ref 0–5)
LDLC SERPL CALC-MCNC: 94.4 MG/DL (ref 0–100)
POTASSIUM SERPL-SCNC: 3.4 MMOL/L (ref 3.5–5.1)
SODIUM SERPL-SCNC: 137 MMOL/L (ref 136–145)
T4 FREE SERPL-MCNC: 1.2 NG/DL (ref 0.8–1.5)
TRIGL SERPL-MCNC: 73 MG/DL
TSH SERPL DL<=0.05 MIU/L-ACNC: 2.45 UIU/ML (ref 0.36–3.74)
VLDLC SERPL CALC-MCNC: 14.6 MG/DL

## 2024-12-20 PROCEDURE — 80048 BASIC METABOLIC PNL TOTAL CA: CPT

## 2024-12-20 PROCEDURE — 94761 N-INVAS EAR/PLS OXIMETRY MLT: CPT

## 2024-12-20 PROCEDURE — G0378 HOSPITAL OBSERVATION PER HR: HCPCS

## 2024-12-20 PROCEDURE — 93306 TTE W/DOPPLER COMPLETE: CPT

## 2024-12-20 PROCEDURE — 6370000000 HC RX 637 (ALT 250 FOR IP): Performed by: INTERNAL MEDICINE

## 2024-12-20 PROCEDURE — 97535 SELF CARE MNGMENT TRAINING: CPT | Performed by: OCCUPATIONAL THERAPIST

## 2024-12-20 PROCEDURE — 93306 TTE W/DOPPLER COMPLETE: CPT | Performed by: SPECIALIST

## 2024-12-20 PROCEDURE — 84443 ASSAY THYROID STIM HORMONE: CPT

## 2024-12-20 PROCEDURE — 80061 LIPID PANEL: CPT

## 2024-12-20 PROCEDURE — 97116 GAIT TRAINING THERAPY: CPT

## 2024-12-20 PROCEDURE — 93010 ELECTROCARDIOGRAM REPORT: CPT | Performed by: SPECIALIST

## 2024-12-20 PROCEDURE — 6360000002 HC RX W HCPCS: Performed by: INTERNAL MEDICINE

## 2024-12-20 PROCEDURE — 96374 THER/PROPH/DIAG INJ IV PUSH: CPT

## 2024-12-20 PROCEDURE — 36415 COLL VENOUS BLD VENIPUNCTURE: CPT

## 2024-12-20 PROCEDURE — 2500000003 HC RX 250 WO HCPCS: Performed by: INTERNAL MEDICINE

## 2024-12-20 PROCEDURE — 83036 HEMOGLOBIN GLYCOSYLATED A1C: CPT

## 2024-12-20 PROCEDURE — 99223 1ST HOSP IP/OBS HIGH 75: CPT | Performed by: PSYCHIATRY & NEUROLOGY

## 2024-12-20 PROCEDURE — 97161 PT EVAL LOW COMPLEX 20 MIN: CPT

## 2024-12-20 PROCEDURE — 84439 ASSAY OF FREE THYROXINE: CPT

## 2024-12-20 PROCEDURE — 97165 OT EVAL LOW COMPLEX 30 MIN: CPT | Performed by: OCCUPATIONAL THERAPIST

## 2024-12-20 RX ORDER — HYDROCHLOROTHIAZIDE 25 MG/1
25 TABLET ORAL DAILY
Status: DISCONTINUED | OUTPATIENT
Start: 2024-12-20 | End: 2024-12-22 | Stop reason: HOSPADM

## 2024-12-20 RX ORDER — POLYETHYLENE GLYCOL 3350 17 G/17G
17 POWDER, FOR SOLUTION ORAL DAILY PRN
Status: DISCONTINUED | OUTPATIENT
Start: 2024-12-20 | End: 2024-12-22 | Stop reason: HOSPADM

## 2024-12-20 RX ORDER — METOPROLOL SUCCINATE 50 MG/1
50 TABLET, EXTENDED RELEASE ORAL DAILY
Status: DISCONTINUED | OUTPATIENT
Start: 2024-12-20 | End: 2024-12-22 | Stop reason: HOSPADM

## 2024-12-20 RX ORDER — LISINOPRIL 20 MG/1
20 TABLET ORAL DAILY
Status: DISCONTINUED | OUTPATIENT
Start: 2024-12-20 | End: 2024-12-22 | Stop reason: HOSPADM

## 2024-12-20 RX ORDER — ATORVASTATIN CALCIUM 10 MG/1
10 TABLET, FILM COATED ORAL DAILY
Status: DISCONTINUED | OUTPATIENT
Start: 2024-12-20 | End: 2024-12-22 | Stop reason: HOSPADM

## 2024-12-20 RX ORDER — HYDRALAZINE HYDROCHLORIDE 20 MG/ML
10 INJECTION INTRAMUSCULAR; INTRAVENOUS EVERY 6 HOURS PRN
Status: DISCONTINUED | OUTPATIENT
Start: 2024-12-20 | End: 2024-12-22 | Stop reason: HOSPADM

## 2024-12-20 RX ORDER — SODIUM CHLORIDE 0.9 % (FLUSH) 0.9 %
5-40 SYRINGE (ML) INJECTION EVERY 12 HOURS SCHEDULED
Status: DISCONTINUED | OUTPATIENT
Start: 2024-12-20 | End: 2024-12-22 | Stop reason: HOSPADM

## 2024-12-20 RX ORDER — ACETAMINOPHEN 325 MG/1
650 TABLET ORAL EVERY 6 HOURS PRN
Status: DISCONTINUED | OUTPATIENT
Start: 2024-12-20 | End: 2024-12-22 | Stop reason: HOSPADM

## 2024-12-20 RX ORDER — FUROSEMIDE 10 MG/ML
40 INJECTION INTRAMUSCULAR; INTRAVENOUS DAILY
Status: DISCONTINUED | OUTPATIENT
Start: 2024-12-20 | End: 2024-12-20

## 2024-12-20 RX ORDER — SODIUM CHLORIDE 0.9 % (FLUSH) 0.9 %
5-40 SYRINGE (ML) INJECTION PRN
Status: DISCONTINUED | OUTPATIENT
Start: 2024-12-20 | End: 2024-12-22 | Stop reason: HOSPADM

## 2024-12-20 RX ORDER — SODIUM CHLORIDE 9 MG/ML
INJECTION, SOLUTION INTRAVENOUS PRN
Status: DISCONTINUED | OUTPATIENT
Start: 2024-12-20 | End: 2024-12-22 | Stop reason: HOSPADM

## 2024-12-20 RX ORDER — PANTOPRAZOLE SODIUM 40 MG/1
40 TABLET, DELAYED RELEASE ORAL
Status: DISCONTINUED | OUTPATIENT
Start: 2024-12-20 | End: 2024-12-22 | Stop reason: HOSPADM

## 2024-12-20 RX ORDER — LISINOPRIL 5 MG/1
10 TABLET ORAL DAILY
Status: DISCONTINUED | OUTPATIENT
Start: 2024-12-20 | End: 2024-12-20

## 2024-12-20 RX ORDER — ONDANSETRON 4 MG/1
4 TABLET, ORALLY DISINTEGRATING ORAL EVERY 8 HOURS PRN
Status: DISCONTINUED | OUTPATIENT
Start: 2024-12-20 | End: 2024-12-22 | Stop reason: HOSPADM

## 2024-12-20 RX ORDER — ENOXAPARIN SODIUM 100 MG/ML
40 INJECTION SUBCUTANEOUS DAILY
Status: DISCONTINUED | OUTPATIENT
Start: 2024-12-20 | End: 2024-12-20

## 2024-12-20 RX ORDER — ONDANSETRON 2 MG/ML
4 INJECTION INTRAMUSCULAR; INTRAVENOUS EVERY 6 HOURS PRN
Status: DISCONTINUED | OUTPATIENT
Start: 2024-12-20 | End: 2024-12-22 | Stop reason: HOSPADM

## 2024-12-20 RX ORDER — MAGNESIUM SULFATE IN WATER 40 MG/ML
2000 INJECTION, SOLUTION INTRAVENOUS PRN
Status: DISCONTINUED | OUTPATIENT
Start: 2024-12-20 | End: 2024-12-22 | Stop reason: HOSPADM

## 2024-12-20 RX ORDER — POTASSIUM CHLORIDE 7.45 MG/ML
10 INJECTION INTRAVENOUS PRN
Status: DISCONTINUED | OUTPATIENT
Start: 2024-12-20 | End: 2024-12-22 | Stop reason: HOSPADM

## 2024-12-20 RX ORDER — ACETAMINOPHEN 650 MG/1
650 SUPPOSITORY RECTAL EVERY 6 HOURS PRN
Status: DISCONTINUED | OUTPATIENT
Start: 2024-12-20 | End: 2024-12-22 | Stop reason: HOSPADM

## 2024-12-20 RX ORDER — POTASSIUM CHLORIDE 750 MG/1
40 TABLET, EXTENDED RELEASE ORAL PRN
Status: DISCONTINUED | OUTPATIENT
Start: 2024-12-20 | End: 2024-12-22 | Stop reason: HOSPADM

## 2024-12-20 RX ADMIN — PANTOPRAZOLE SODIUM 40 MG: 40 TABLET, DELAYED RELEASE ORAL at 07:58

## 2024-12-20 RX ADMIN — APIXABAN 5 MG: 5 TABLET, FILM COATED ORAL at 01:10

## 2024-12-20 RX ADMIN — ACETAMINOPHEN 650 MG: 325 TABLET ORAL at 20:44

## 2024-12-20 RX ADMIN — FUROSEMIDE 40 MG: 10 INJECTION, SOLUTION INTRAMUSCULAR; INTRAVENOUS at 00:51

## 2024-12-20 RX ADMIN — ATORVASTATIN CALCIUM 10 MG: 20 TABLET, FILM COATED ORAL at 08:46

## 2024-12-20 RX ADMIN — SODIUM CHLORIDE, PRESERVATIVE FREE 10 ML: 5 INJECTION INTRAVENOUS at 20:44

## 2024-12-20 RX ADMIN — METOPROLOL SUCCINATE 50 MG: 50 TABLET, EXTENDED RELEASE ORAL at 08:46

## 2024-12-20 RX ADMIN — SODIUM CHLORIDE, PRESERVATIVE FREE 10 ML: 5 INJECTION INTRAVENOUS at 10:22

## 2024-12-20 RX ADMIN — LISINOPRIL 20 MG: 20 TABLET ORAL at 03:34

## 2024-12-20 RX ADMIN — HYDROCHLOROTHIAZIDE 25 MG: 25 TABLET ORAL at 08:46

## 2024-12-20 RX ADMIN — APIXABAN 5 MG: 5 TABLET, FILM COATED ORAL at 08:49

## 2024-12-20 RX ADMIN — APIXABAN 5 MG: 5 TABLET, FILM COATED ORAL at 20:45

## 2024-12-20 ASSESSMENT — PAIN DESCRIPTION - LOCATION: LOCATION: FOOT

## 2024-12-20 ASSESSMENT — PAIN SCALES - GENERAL
PAINLEVEL_OUTOF10: 3
PAINLEVEL_OUTOF10: 0
PAINLEVEL_OUTOF10: 0

## 2024-12-20 ASSESSMENT — PAIN DESCRIPTION - DESCRIPTORS: DESCRIPTORS: NAGGING;TINGLING

## 2024-12-20 NOTE — CONSULTS
MARIA ISABEL MIR [facility] [place]    Latosha Todd  YOB: 1940          Assessment & Plan:     CKD 3a f/b Dr. Winters. Creat at baseline  HTN, uncontrolled. May be challenges with adherence at home. Currently much better  HL    Rec:  Continue current medications and monitor BP  Agree with starting over with new prescriptions and help from family  She reports some cramps so would consider backing off diuretics if cramping recurs       Subjective:   CC: elevated creatinine  HPI: Patient of Dr. HERNÁNDEZ with CKD 3a and HTN came in with AMS and uncontrolled HTN. She appeared to have some confusion about her medications. She has been restarted on oral medications and is now well controlled. Creat is at baseline.   ROS: No sob/n/v  Current Facility-Administered Medications   Medication Dose Route Frequency    furosemide (LASIX) injection 40 mg  40 mg IntraVENous Daily    sodium chloride flush 0.9 % injection 5-40 mL  5-40 mL IntraVENous 2 times per day    sodium chloride flush 0.9 % injection 5-40 mL  5-40 mL IntraVENous PRN    0.9 % sodium chloride infusion   IntraVENous PRN    potassium chloride (KLOR-CON) extended release tablet 40 mEq  40 mEq Oral PRN    Or    potassium bicarb-citric acid (EFFER-K) effervescent tablet 40 mEq  40 mEq Oral PRN    Or    potassium chloride 10 mEq/100 mL IVPB (Peripheral Line)  10 mEq IntraVENous PRN    magnesium sulfate 2000 mg in 50 mL IVPB premix  2,000 mg IntraVENous PRN    ondansetron (ZOFRAN-ODT) disintegrating tablet 4 mg  4 mg Oral Q8H PRN    Or    ondansetron (ZOFRAN) injection 4 mg  4 mg IntraVENous Q6H PRN    melatonin tablet 3 mg  3 mg Oral Nightly PRN    polyethylene glycol (GLYCOLAX) packet 17 g  17 g Oral Daily PRN    acetaminophen (TYLENOL) tablet 650 mg  650 mg Oral Q6H PRN    Or    acetaminophen (TYLENOL) suppository 650 mg  650 mg Rectal Q6H PRN    hydrALAZINE (APRESOLINE) injection 10 mg  10 mg IntraVENous Q6H PRN    apixaban 
mg, Rectal, Q6H PRN, Kacie Shane MD    hydrALAZINE (APRESOLINE) injection 10 mg, 10 mg, IntraVENous, Q6H PRN, Kacie Shane MD    apixaban (ELIQUIS) tablet 5 mg, 5 mg, Oral, BID, Kacie Shane MD, 5 mg at 12/20/24 0849    metoprolol succinate (TOPROL XL) extended release tablet 50 mg, 50 mg, Oral, Daily, Kacie Shane MD, 50 mg at 12/20/24 0846    pantoprazole (PROTONIX) tablet 40 mg, 40 mg, Oral, QAM AC, Kacie Shane MD, 40 mg at 12/20/24 0758    atorvastatin (LIPITOR) tablet 10 mg, 10 mg, Oral, Daily, Kacie Shane MD, 10 mg at 12/20/24 0846    lisinopril (PRINIVIL;ZESTRIL) tablet 20 mg, 20 mg, Oral, Daily, Kacie Shane MD, 20 mg at 12/20/24 0334    hydroCHLOROthiazide (HYDRODIURIL) tablet 25 mg, 25 mg, Oral, Daily, Kacie Shane MD, 25 mg at 12/20/24 0846    =====================================    MEDICAL DECISION MAKING (2 of 3: A +/- B +/- C)    A) Number and Complexity of Problem(s) Addressed:  [] 1 stable, acute illness (Low)  [] 1 stable, chronic illness (Low)  [] 1 acute illness with systemic symptoms (Moderate)  [] 1 undiagnosed new problem with uncertain prognosis (Moderate)  [] 1 or more chronic illness with exacerbation, progression, or side effects of treatment (Moderate)  [] 1 or more chronic illnesses with severe exacerbation, progression, or side effects of treatment (High)  [x] 1 acute or chronic illness or injury that poses a threat to life or bodily function (High)    B) Amount/ Complexity of Data reviewed (1 of 3 categories = Moderate, 2 of 3 categories = High)    Test, documents, or independent historian(s) (any THREE ELEMENTS):   []  Review of external note(s) from unique source:  [x]  Review of the result(s) of each unique test  (3+)   []  Ordered a unique test   []  Discussed with a historian other than the patient:     Independent interpretation of a test performed by another Physician / QHP:     [] Yes

## 2024-12-20 NOTE — ED PROVIDER NOTES
no  Recommended Level of Care: telemetry  Department: [facility] - [phone]          CONSULTS:  None    PROCEDURES:  Unless otherwise noted below, none     Procedures      FINAL IMPRESSION      1. Hypertensive urgency    2. Tremor of both hands    3. Speech disturbance, unspecified type    4. Elevated brain natriuretic peptide (BNP) level          DISPOSITION/PLAN   DISPOSITION Decision To Admit 12/20/2024 12:17:28 AM      PATIENT REFERRED TO:  No follow-up provider specified.    DISCHARGE MEDICATIONS:  New Prescriptions    No medications on file         (Please note that portions of this note were completed with a voice recognition program.  Efforts were made to edit the dictations but occasionally words are mis-transcribed.)    Kristan Morris MD (electronically signed)  Emergency Attending Physician / Physician Assistant / Nurse Practitioner              Kristan Morris MD  12/20/24 0025

## 2024-12-20 NOTE — CARE COORDINATION
Care Management Initial Assessment  12/20/2024 10:33 AM  If patient is discharged prior to next notation, then this note serves as note for discharge by case management.    Reason for Admission:   Shortness of breath [R06.02]  Acute diastolic congestive heart failure (HCC) [I50.31]  Hypertensive urgency [I16.0]  Elevated brain natriuretic peptide (BNP) level [R79.89]  Hypertensive emergency [I16.1]  Tremor of both hands [R25.1]  Speech disturbance, unspecified type [R47.9]         GUSMAN done 12/20/24  Hospitalization in the last 30 days (Readmission):  No        Advance Care Planning:  Code Status: Full Code  Primary Healthcare Decision Maker: (P) Legal Next of Kin   Advance Directive: has NO advanced directive - not interested in additional information     __________________________________________________________________________  Assessment:      12/20/24 0931   Service Assessment   Patient Orientation Alert and Oriented   Cognition Alert   History Provided By Patient   Primary Caregiver Self   Support Systems Children;Family Members   Patient's Healthcare Decision Maker is: Legal Next of Kin   PCP Verified by CM Yes  (Edil Campoverde)   Last Visit to PCP Within last 3 months   Prior Functional Level Independent in ADLs/IADLs   Current Functional Level Independent in ADLs/IADLs   Can patient return to prior living arrangement Yes   Ability to make needs known: Good   Family able to assist with home care needs: Yes   Would you like for me to discuss the discharge plan with any other family members/significant others, and if so, who? No   Financial Resources Medicare;Other (Comment)  (secondary)   Community Resources None   Social/Functional History   Lives With Alone   Type of Home House   Home Layout One level   Home Access Stairs to enter with rails   Entrance Stairs - Number of Steps 3   Entrance Stairs - Rails None   Bathroom Shower/Tub Tub/Shower unit   Bathroom Equipment Shower chair   Home Equipment

## 2024-12-20 NOTE — CARE COORDINATION
Update  Pt is recommending outpatient physical therapy.  When discharged pt will need script for OP PT.    Bernice Guerra RN

## 2024-12-20 NOTE — ED TRIAGE NOTES
Pt arrives to ED via POV ambulatory stating she went to Urgent Care yesterday and had blood work done for elevated blood pressure, which pt states she has had for extensive amount of time) and tremors. Pt reports she sees cardiology and forwarded her lab results to them who told her she might have CHF. Below are the abnormal results from Urgent Care. Pt denies vision changes, numbness/tingling, CP, SOB, HA. Pt takes BP meds and has had changes to BP meds recenlty. Pt takes 20mg Lisinopril and 12.5mg HCTZ and 2.5mg bisoprolol    BNP 1585  MCH 34.6

## 2024-12-20 NOTE — ED NOTES
TRANSFER - OUT REPORT:    Verbal report given to DIONICIO Oliver on Latosha FUNEZ Suramandeep  being transferred to [facility] for routine progression of patient care       Report consisted of patient's Situation, Background, Assessment and   Recommendations(SBAR).     Information from the following report(s) Nurse Handoff Report, Index, ED Encounter Summary, ED SBAR, Adult Overview, MAR, Recent Results, Cardiac Rhythm NSR, and Neuro Assessment was reviewed with the receiving nurse.    [place] Fall Assessment:    Presents to emergency department  because of falls (Syncope, seizure, or loss of consciousness): No  Age > 70: Yes  Altered Mental Status, Intoxication with alcohol or substance confusion (Disorientation, impaired judgment, poor safety awaremess, or inability to follow instructions): No  Impaired Mobility: Ambulates or transfers with assistive devices or assistance; Unable to ambulate or transer.: No  Nursing Judgement: No          Lines:   Peripheral IV 12/19/24 Left Antecubital (Active)        Opportunity for questions and clarification was provided.      Patient transported with:  Transport

## 2024-12-20 NOTE — H&P
clinical/nonclinical/ nursing providers based on care coordination needs.     Assessment:   Given the patient's current clinical presentation, there is a high level of concern for decompensation if discharged from the emergency department. Complex decision making was performed, which includes reviewing the patient's available past medical records, laboratory results, and imaging studies.    Principal Problem:    Hypertensive emergency  Resolved Problems:    * No resolved hospital problems. *      Plan:     Latosha Todd is a very pleasant 84 y.o. female with a past medical history of HFpEF, CKD 3A, HTN, HLD, OA, who lives independently and was brought into the emergency room due to tremors, intermittent confusion, and not being herself.  She is being admitted for hypertensive urgency.    Hypertensive emergency, POA  Hypertensive encephalopathy 2/2 hypertensive emergency  --Blood pressures in the 200-220 systolic range on presentation  -- Encephalopathy, elevated BNP, tremor  --Provided labetalol with small decrease in blood pressure  --We will continue as needed hydralazine  --Admit to telemetry  --Repeat neuro checks  --Start p.o. meds  - Patient's daughter will bring in all of her home medications, and we would like to represcribe all current medications as they need to be taken, so that family can understand and assist their mother with her meds.  She has had numerous medications, multiple bottles, and is confused about what meds she is taken.  During her conversation she confused lisinopril and Lasix, and believes that she is currently still taking bisoprolol and Lasix, but her med dispense report is not consistent with this.  She may be getting the medications and they are not being reported?  - Consult case management for home health  -Consult nephrology as they are managing her blood pressure as an outpatient    Hyperlipidemia, POA  --Check lipid panel  --Continue statin    History of CKD 3A  - Creatinine

## 2024-12-20 NOTE — PLAN OF CARE
Problem: Physical Therapy - Adult  Goal: By Discharge: Performs mobility at highest level of function for planned discharge setting.  See evaluation for individualized goals.  Description: FUNCTIONAL STATUS PRIOR TO ADMISSION: Patient was independent and active without use of DME.    HOME SUPPORT PRIOR TO ADMISSION: The patient lived alone with family locally to provide assistance if needed.    Physical Therapy Goals  Initiated 12/20/2024  1.  Patient will move from supine to sit and sit to supine, scoot up and down, and roll side to side in bed with independence within 7 day(s).    2.  Patient will perform sit to stand with modified independence within 7 day(s).  3.  Patient will transfer from bed to chair and chair to bed with modified independence using the least restrictive device within 7 day(s).  4.  Patient will ambulate with modified independence for 150 feet with the least restrictive device within 7 day(s).   5.  Patient will ascend/descend 2 stairs with 0 handrail(s) with supervision/set-up within 7 day(s).    Outcome: Progressing   PHYSICAL THERAPY EVALUATION    Patient: Latosha Todd (84 y.o. female)  Date: 12/20/2024  Primary Diagnosis: Shortness of breath [R06.02]  Acute diastolic congestive heart failure (HCC) [I50.31]  Hypertensive urgency [I16.0]  Elevated brain natriuretic peptide (BNP) level [R79.89]  Hypertensive emergency [I16.1]  Tremor of both hands [R25.1]  Speech disturbance, unspecified type [R47.9]       Precautions: Restrictions/Precautions: Fall Risk                      ASSESSMENT :   DEFICITS/IMPAIRMENTS:   The patient is limited by decreased functional mobility, endurance, safety awareness, attention/concentration, balance, and gait dysfunction s/p admission for hypertensive urgency.  Patient oriented x 4 but demonstrated decreased attention, distractibility, and decreased insight into deficits.  Received walking in her room, overall SUP/SBA for bed mob and transfers but with

## 2024-12-21 PROBLEM — I16.0 HYPERTENSIVE URGENCY: Status: ACTIVE | Noted: 2024-12-21

## 2024-12-21 LAB
ANION GAP SERPL CALC-SCNC: 8 MMOL/L (ref 2–12)
BASOPHILS # BLD: 0.1 K/UL (ref 0–0.1)
BASOPHILS NFR BLD: 1 % (ref 0–1)
BUN SERPL-MCNC: 22 MG/DL (ref 6–20)
BUN/CREAT SERPL: 16 (ref 12–20)
CALCIUM SERPL-MCNC: 9.2 MG/DL (ref 8.5–10.1)
CHLORIDE SERPL-SCNC: 98 MMOL/L (ref 97–108)
CO2 SERPL-SCNC: 27 MMOL/L (ref 21–32)
CREAT SERPL-MCNC: 1.34 MG/DL (ref 0.55–1.02)
DIFFERENTIAL METHOD BLD: ABNORMAL
EKG ATRIAL RATE: 65 BPM
EKG DIAGNOSIS: NORMAL
EKG P AXIS: 83 DEGREES
EKG P-R INTERVAL: 322 MS
EKG Q-T INTERVAL: 448 MS
EKG QRS DURATION: 90 MS
EKG QTC CALCULATION (BAZETT): 465 MS
EKG R AXIS: 63 DEGREES
EKG T AXIS: 44 DEGREES
EKG VENTRICULAR RATE: 65 BPM
EOSINOPHIL # BLD: 0.2 K/UL (ref 0–0.4)
EOSINOPHIL NFR BLD: 4 % (ref 0–7)
ERYTHROCYTE [DISTWIDTH] IN BLOOD BY AUTOMATED COUNT: 13.3 % (ref 11.5–14.5)
GLUCOSE SERPL-MCNC: 107 MG/DL (ref 65–100)
HCT VFR BLD AUTO: 35.8 % (ref 35–47)
HGB BLD-MCNC: 11.7 G/DL (ref 11.5–16)
IMM GRANULOCYTES # BLD AUTO: 0 K/UL (ref 0–0.04)
IMM GRANULOCYTES NFR BLD AUTO: 1 % (ref 0–0.5)
LYMPHOCYTES # BLD: 1.6 K/UL (ref 0.8–3.5)
LYMPHOCYTES NFR BLD: 40 % (ref 12–49)
MCH RBC QN AUTO: 34.8 PG (ref 26–34)
MCHC RBC AUTO-ENTMCNC: 32.7 G/DL (ref 30–36.5)
MCV RBC AUTO: 106.5 FL (ref 80–99)
MONOCYTES # BLD: 0.5 K/UL (ref 0–1)
MONOCYTES NFR BLD: 12 % (ref 5–13)
NEUTS SEG # BLD: 1.7 K/UL (ref 1.8–8)
NEUTS SEG NFR BLD: 42 % (ref 32–75)
NRBC # BLD: 0 K/UL (ref 0–0.01)
NRBC BLD-RTO: 0 PER 100 WBC
PLATELET # BLD AUTO: 195 K/UL (ref 150–400)
PMV BLD AUTO: 12.7 FL (ref 8.9–12.9)
POTASSIUM SERPL-SCNC: 3.7 MMOL/L (ref 3.5–5.1)
RBC # BLD AUTO: 3.36 M/UL (ref 3.8–5.2)
SODIUM SERPL-SCNC: 133 MMOL/L (ref 136–145)
WBC # BLD AUTO: 3.9 K/UL (ref 3.6–11)

## 2024-12-21 PROCEDURE — 94761 N-INVAS EAR/PLS OXIMETRY MLT: CPT

## 2024-12-21 PROCEDURE — 6370000000 HC RX 637 (ALT 250 FOR IP): Performed by: INTERNAL MEDICINE

## 2024-12-21 PROCEDURE — 80048 BASIC METABOLIC PNL TOTAL CA: CPT

## 2024-12-21 PROCEDURE — 85025 COMPLETE CBC W/AUTO DIFF WBC: CPT

## 2024-12-21 PROCEDURE — 2060000000 HC ICU INTERMEDIATE R&B

## 2024-12-21 PROCEDURE — 96375 TX/PRO/DX INJ NEW DRUG ADDON: CPT

## 2024-12-21 PROCEDURE — 2500000003 HC RX 250 WO HCPCS: Performed by: INTERNAL MEDICINE

## 2024-12-21 PROCEDURE — 93010 ELECTROCARDIOGRAM REPORT: CPT | Performed by: HOSPITALIST

## 2024-12-21 PROCEDURE — 36415 COLL VENOUS BLD VENIPUNCTURE: CPT

## 2024-12-21 PROCEDURE — 6360000002 HC RX W HCPCS: Performed by: INTERNAL MEDICINE

## 2024-12-21 PROCEDURE — 93005 ELECTROCARDIOGRAM TRACING: CPT | Performed by: INTERNAL MEDICINE

## 2024-12-21 RX ORDER — METOPROLOL SUCCINATE 50 MG/1
50 TABLET, EXTENDED RELEASE ORAL DAILY
Qty: 30 TABLET | Refills: 1 | Status: ON HOLD | OUTPATIENT
Start: 2024-12-22 | End: 2024-12-24 | Stop reason: HOSPADM

## 2024-12-21 RX ORDER — LISINOPRIL 20 MG/1
20 TABLET ORAL DAILY
Qty: 30 TABLET | Refills: 1 | Status: ON HOLD | OUTPATIENT
Start: 2024-12-21 | End: 2024-12-24

## 2024-12-21 RX ORDER — HYDROCHLOROTHIAZIDE 25 MG/1
25 TABLET ORAL DAILY
Qty: 30 TABLET | Refills: 1 | Status: SHIPPED | OUTPATIENT
Start: 2024-12-22 | End: 2024-12-22

## 2024-12-21 RX ADMIN — APIXABAN 5 MG: 5 TABLET, FILM COATED ORAL at 21:24

## 2024-12-21 RX ADMIN — ACETAMINOPHEN 650 MG: 325 TABLET ORAL at 14:53

## 2024-12-21 RX ADMIN — HYDRALAZINE HYDROCHLORIDE 10 MG: 20 INJECTION INTRAMUSCULAR; INTRAVENOUS at 06:12

## 2024-12-21 RX ADMIN — ATORVASTATIN CALCIUM 10 MG: 20 TABLET, FILM COATED ORAL at 08:35

## 2024-12-21 RX ADMIN — METOPROLOL SUCCINATE 50 MG: 50 TABLET, EXTENDED RELEASE ORAL at 08:34

## 2024-12-21 RX ADMIN — LISINOPRIL 20 MG: 20 TABLET ORAL at 08:34

## 2024-12-21 RX ADMIN — PANTOPRAZOLE SODIUM 40 MG: 40 TABLET, DELAYED RELEASE ORAL at 06:34

## 2024-12-21 RX ADMIN — APIXABAN 5 MG: 5 TABLET, FILM COATED ORAL at 08:35

## 2024-12-21 RX ADMIN — ACETAMINOPHEN 650 MG: 325 TABLET ORAL at 21:27

## 2024-12-21 RX ADMIN — HYDROCHLOROTHIAZIDE 25 MG: 25 TABLET ORAL at 09:04

## 2024-12-21 RX ADMIN — SODIUM CHLORIDE, PRESERVATIVE FREE 10 ML: 5 INJECTION INTRAVENOUS at 21:24

## 2024-12-21 RX ADMIN — SODIUM CHLORIDE, PRESERVATIVE FREE 10 ML: 5 INJECTION INTRAVENOUS at 08:38

## 2024-12-21 ASSESSMENT — PAIN DESCRIPTION - DESCRIPTORS
DESCRIPTORS: ACHING
DESCRIPTORS: ACHING

## 2024-12-21 ASSESSMENT — PAIN DESCRIPTION - ORIENTATION: ORIENTATION: MID

## 2024-12-21 ASSESSMENT — PAIN - FUNCTIONAL ASSESSMENT: PAIN_FUNCTIONAL_ASSESSMENT: ACTIVITIES ARE NOT PREVENTED

## 2024-12-21 ASSESSMENT — PAIN SCALES - GENERAL
PAINLEVEL_OUTOF10: 0
PAINLEVEL_OUTOF10: 4
PAINLEVEL_OUTOF10: 0
PAINLEVEL_OUTOF10: 1
PAINLEVEL_OUTOF10: 0
PAINLEVEL_OUTOF10: 3
PAINLEVEL_OUTOF10: 0
PAINLEVEL_OUTOF10: 0

## 2024-12-21 ASSESSMENT — PAIN DESCRIPTION - LOCATION
LOCATION: HEAD
LOCATION: HEAD

## 2024-12-21 NOTE — PROGRESS NOTES
Hospitalist Progress Note      NAME:  Latosha Todd   :  1940  MRM:  [id number]    Date/Time: 2024  7:56 AM           Assessment / Plan:     Latosha Todd is a very pleasant 84 y.o. female with a past medical history of HFpEF, CKD 3A, HTN, HLD, OA, who lives independently and was brought into the emergency room due to tremors, intermittent confusion, and not being herself.     Hypertensive emergency, POA  Hypertensive encephalopathy 2/2 hypertensive emergency  Tremor & encephalopathy- improving   - On presentation, -220  with encephalopathy, elevated BNP, tremor. Some confusion regarding home regimen. Currently home med listed as HCTZ 12.5 mg, bisoprolol 5 mg, and lisinopril 10 mg. Patient has been taking differently.   - Plan to start over with new scripts & set up HH on DC to help with med management on home. Will resume HCTZ at 25, lisinopril at 20, and BB with formulary substitution   - Continue lasix 40 mg QD, may need reduce dose if cramping   - Monitor BP and adjusted as needed  - IV hydralazine PRN   - PT eval, OP PT recommended   - Nephrology consulted, managing her BP OP   - Neurology consulted, recommendations pending   - Echo pending     Atrial fibrillation   - Continue home eliquis     Hyperlipidemia, POA  - Continue statin     History of CKD 3A  - Creatinine at baseline     Osteoarthritis  - Tylenol as needed    I have personally reviewed the radiographs, laboratory data in Epic and decisions and statements above are based partially on this personal interpretation.                 Care Plan discussed with: Patient, Family, Care Manager, Nursing Staff, and Consultant/Specialist    Discussed:  Care Plan and D/C Planning    Prophylaxis:  Eliquis     Disposition:  Home w/Family, OP PT and  HH for med asssitance     Total time spent with patient care: 35 Minutes **I personally saw and examined the patient during this time 
OCCUPATIONAL THERAPY EVALUATION/DISCHARGE  Patient: Latosha Todd (84 y.o. female)  Date: 12/20/2024  Primary Diagnosis: Shortness of breath [R06.02]  Acute diastolic congestive heart failure (HCC) [I50.31]  Hypertensive urgency [I16.0]  Elevated brain natriuretic peptide (BNP) level [R79.89]  Hypertensive emergency [I16.1]  Tremor of both hands [R25.1]  Speech disturbance, unspecified type [R47.9]         Precautions: Fall Risk                  ASSESSMENT :  Based on the objective data below, the patient presents at an overall SBA level with LE ADLs and toileting and requires up to min A for functional mobility amb without AD following admission for hypertensive urgency.  Pt was unable to self correct LOB without min A.  Pt was oriented x4 however very talkative, easily distracted and tangential at times.  Pt to address balance and mobility impairments.  No further skilled acute OT required at this time.  Thank you for this consult.    Patient Vitals for the past 3 hrs:   Pulse BP Patient Position   12/20/24 1045 80 (!) 153/84 --   12/20/24 1030 83 139/81 --   12/20/24 1015 83 138/85 --   12/20/24 1000 90 136/71 --   12/20/24 0947 97 (!) 159/82 Sitting        Functional Outcome Measure:  The patient scored 21/24 on the ADL AM-PAC outcome measure.      Further skilled acute occupational therapy is not indicated at this time.     PLAN :  Recommend with staff: up with assist for safety    Recommendation for discharge: (in order for the patient to meet his/her long term goals):   No skilled occupational therapy    Other factors to consider for discharge: lives alone, available support system works or is unable to provide adequate supervision and the patient would be alone, and concern for safely navigating or managing the home environment    IF patient discharges home will need the following DME: none for OT     SUBJECTIVE:   Patient stated, “I am always like this.”    OBJECTIVE DATA SUMMARY:     Past Medical 
mg IntraVENous Q6H PRN    apixaban (ELIQUIS) tablet 5 mg  5 mg Oral BID    metoprolol succinate (TOPROL XL) extended release tablet 50 mg  50 mg Oral Daily    pantoprazole (PROTONIX) tablet 40 mg  40 mg Oral QAM AC    atorvastatin (LIPITOR) tablet 10 mg  10 mg Oral Daily    lisinopril (PRINIVIL;ZESTRIL) tablet 20 mg  20 mg Oral Daily    hydroCHLOROthiazide (HYDRODIURIL) tablet 25 mg  25 mg Oral Daily       Physical Examination:    General:   Weak and ill looking patient in no acute distress  Eyes:   pink conjunctivae, PERRLA with no discharge.  ENT:   no ottorrhea or rhinorrhea with dry mucous membranes  Neck: no masses, thyroid non-tender and trachea central.  Pulm:  no accessory muscle use, decreased but clear breath sounds without crackles or wheezes  Card:  no JVD or murmurs, has regular and normal S1, S2 without thrills, bruits or peripheral edema  Abd:  Soft, non-tender, non-distended, normoactive bowel sounds with no palpable organomegaly  Musc:  No cyanosis, clubbing, atrophy or deformities.  Skin:  No rashes, bruising or ulcers.   Neuro: Awake and alert. Generally a non focal exam. Follows commands appropriately  Psych:  Has a fair insight and is oriented x 3    Diagnostic testing:    Laboratory data reviewed and independently interpreted:    Recent Labs     12/19/24 2148 12/21/24  0434   WBC 4.1 3.9   HGB 11.1* 11.7   HCT 34.1* 35.8   RBC 3.20* 3.36*   .6* 106.5*   MCH 34.7* 34.8*    195     No results found for: \"LACTA\"  Recent Labs     12/19/24 2148 12/20/24  0104 12/21/24  0434    137 133*   K 3.3* 3.4* 3.7    103 98   CO2 28 28 27   GLUCOSE 114* 110* 107*   BUN 22* 21* 22*   CREATININE 1.35* 1.20* 1.34*   CALCIUM 9.4 9.5 9.2   BILITOT 0.6  --   --    ALKPHOS 81  --   --    AST 21  --   --    ALT 18  --   --      No components found for: \"GLUCOSEPOC\"  Lab Results   Component Value Date/Time    CHOL 156 12/20/2024 01:04 AM    TRIG 73 12/20/2024 01:04 AM    HDL 47 12/20/2024

## 2024-12-21 NOTE — DISCHARGE INSTRUCTIONS
Hospital Medicine DISCHARGE INSTRUCTIONS    NAME: Latosha Todd   :  1940   MRN:  [id number]     Date:     2024    Admission date: 2024     Discharge date:  2024     Reason for your admission:  Shortness of breath [R06.02]  Acute diastolic congestive heart failure (HCC) [I50.31]  Hypertensive urgency [I16.0]  Elevated brain natriuretic peptide (BNP) level [R79.89]  Hypertensive emergency [I16.1]  Tremor of both hands [R25.1]  Speech disturbance, unspecified type [R47.9]    Discharge Diagnoses:  Uncontrolled HTN    DISCHARGE INSTRUCTIONS:    Thank you for allowing us to participate in your care. Your discharging Hospitalist is Rickey Figueroa MD. You were admitted for evaluation and treatment for the above diagnoses.    Medications:     It is important that medications are taken exactly as they are prescribed on the discharge medication instructions and keep them your  in the bottles provided by the pharmacist.   Keep a list of the medication names, dosages, and times to be taken at all times.    Do not take other medications without consulting your doctor.     Recommended diet:  regular diet    Recommended activity: activity as tolerated    Post discharge care:    For questions regarding your Hospitalization or to contact the Hospital Medicine team, please call [phone].    Notify follow up health care provider or return to the emergency department if you cannot get hold of your doctor if you feel worse or experience symptoms similar to those that brought you to hospital    Edil Campoverde, ESTEFANÍA - NP  [address] [place], [place] [id number] [phone]    Schedule an appointment as soon as possible for a visit  to schedule a regular follow up after discharge       Information obtained by :  I understand that if any problems occur once I am at home I am to contact my physician and I understand and acknowledge receipt of the instructions indicated above.

## 2024-12-22 ENCOUNTER — HOSPITAL ENCOUNTER (INPATIENT)
Facility: HOSPITAL | Age: 84
LOS: 2 days | Discharge: HOME HEALTH | DRG: 078 | End: 2024-12-24
Attending: EMERGENCY MEDICINE | Admitting: INTERNAL MEDICINE
Payer: MEDICARE

## 2024-12-22 ENCOUNTER — APPOINTMENT (OUTPATIENT)
Facility: HOSPITAL | Age: 84
End: 2024-12-22
Attending: EMERGENCY MEDICINE
Payer: MEDICARE

## 2024-12-22 ENCOUNTER — HOSPITAL ENCOUNTER (EMERGENCY)
Facility: HOSPITAL | Age: 84
Discharge: ANOTHER HOSPITAL | End: 2024-12-22
Attending: EMERGENCY MEDICINE
Payer: MEDICARE

## 2024-12-22 VITALS
DIASTOLIC BLOOD PRESSURE: 54 MMHG | HEART RATE: 71 BPM | OXYGEN SATURATION: 96 % | TEMPERATURE: 97.9 F | RESPIRATION RATE: 16 BRPM | WEIGHT: 201 LBS | HEIGHT: 64 IN | BODY MASS INDEX: 34.31 KG/M2 | SYSTOLIC BLOOD PRESSURE: 132 MMHG

## 2024-12-22 VITALS
OXYGEN SATURATION: 93 % | HEART RATE: 71 BPM | TEMPERATURE: 97.8 F | SYSTOLIC BLOOD PRESSURE: 144 MMHG | HEIGHT: 64 IN | BODY MASS INDEX: 30.56 KG/M2 | RESPIRATION RATE: 22 BRPM | DIASTOLIC BLOOD PRESSURE: 62 MMHG | WEIGHT: 179 LBS

## 2024-12-22 DIAGNOSIS — N18.9 ACUTE RENAL FAILURE SUPERIMPOSED ON CHRONIC KIDNEY DISEASE, UNSPECIFIED ACUTE RENAL FAILURE TYPE, UNSPECIFIED CKD STAGE (HCC): ICD-10-CM

## 2024-12-22 DIAGNOSIS — R29.90 STROKE-LIKE SYMPTOM: Primary | ICD-10-CM

## 2024-12-22 DIAGNOSIS — N17.9 ACUTE RENAL FAILURE SUPERIMPOSED ON CHRONIC KIDNEY DISEASE, UNSPECIFIED ACUTE RENAL FAILURE TYPE, UNSPECIFIED CKD STAGE (HCC): ICD-10-CM

## 2024-12-22 DIAGNOSIS — N28.9 ACUTE RENAL INSUFFICIENCY: ICD-10-CM

## 2024-12-22 DIAGNOSIS — G45.9 TIA (TRANSIENT ISCHEMIC ATTACK): Primary | ICD-10-CM

## 2024-12-22 PROBLEM — I63.9 ACUTE CVA (CEREBROVASCULAR ACCIDENT) (HCC): Status: ACTIVE | Noted: 2024-12-22

## 2024-12-22 LAB
ALBUMIN SERPL-MCNC: 4.2 G/DL (ref 3.5–5)
ALBUMIN/GLOB SERPL: 1.2 (ref 1.1–2.2)
ALP SERPL-CCNC: 87 U/L (ref 45–117)
ALT SERPL-CCNC: 21 U/L (ref 12–78)
ANION GAP SERPL CALC-SCNC: 11 MMOL/L (ref 2–12)
ANION GAP SERPL CALC-SCNC: 5 MMOL/L (ref 2–12)
AST SERPL W P-5'-P-CCNC: 22 U/L (ref 15–37)
BASOPHILS # BLD: 0.1 K/UL (ref 0–0.1)
BASOPHILS NFR BLD: 1 % (ref 0–1)
BILIRUB SERPL-MCNC: 0.9 MG/DL (ref 0.2–1)
BUN SERPL-MCNC: 29 MG/DL (ref 6–20)
BUN SERPL-MCNC: 35 MG/DL (ref 6–20)
BUN/CREAT SERPL: 15 (ref 12–20)
BUN/CREAT SERPL: 19 (ref 12–20)
CA-I BLD-MCNC: 9.6 MG/DL (ref 8.5–10.1)
CALCIUM SERPL-MCNC: 8.8 MG/DL (ref 8.5–10.1)
CHLORIDE SERPL-SCNC: 93 MMOL/L (ref 97–108)
CHLORIDE SERPL-SCNC: 97 MMOL/L (ref 97–108)
CO2 SERPL-SCNC: 28 MMOL/L (ref 21–32)
CO2 SERPL-SCNC: 29 MMOL/L (ref 21–32)
CREAT SERPL-MCNC: 1.52 MG/DL (ref 0.55–1.02)
CREAT SERPL-MCNC: 2.35 MG/DL (ref 0.55–1.02)
DIFFERENTIAL METHOD BLD: ABNORMAL
EOSINOPHIL # BLD: 0.1 K/UL (ref 0–0.4)
EOSINOPHIL NFR BLD: 1 % (ref 0–7)
ERYTHROCYTE [DISTWIDTH] IN BLOOD BY AUTOMATED COUNT: 13.2 % (ref 11.5–14.5)
GLOBULIN SER CALC-MCNC: 3.4 G/DL (ref 2–4)
GLUCOSE SERPL-MCNC: 116 MG/DL (ref 65–100)
GLUCOSE SERPL-MCNC: 99 MG/DL (ref 65–100)
HCT VFR BLD AUTO: 36.7 % (ref 35–47)
HGB BLD-MCNC: 12.6 G/DL (ref 11.5–16)
IMM GRANULOCYTES # BLD AUTO: 0.1 K/UL (ref 0–0.04)
IMM GRANULOCYTES NFR BLD AUTO: 1 % (ref 0–0.5)
INR PPP: 1.5 (ref 0.9–1.1)
LYMPHOCYTES # BLD: 2 K/UL (ref 0.8–3.5)
LYMPHOCYTES NFR BLD: 35 % (ref 12–49)
MCH RBC QN AUTO: 35.3 PG (ref 26–34)
MCHC RBC AUTO-ENTMCNC: 34.3 G/DL (ref 30–36.5)
MCV RBC AUTO: 102.8 FL (ref 80–99)
MONOCYTES # BLD: 0.6 K/UL (ref 0–1)
MONOCYTES NFR BLD: 10 % (ref 5–13)
NEUTS SEG # BLD: 2.9 K/UL (ref 1.8–8)
NEUTS SEG NFR BLD: 52 % (ref 32–75)
NRBC # BLD: 0 K/UL (ref 0–0.01)
NRBC BLD-RTO: 0 PER 100 WBC
PLATELET # BLD AUTO: 263 K/UL (ref 150–400)
PMV BLD AUTO: 12.5 FL (ref 8.9–12.9)
POTASSIUM SERPL-SCNC: 3.5 MMOL/L (ref 3.5–5.1)
POTASSIUM SERPL-SCNC: 4.3 MMOL/L (ref 3.5–5.1)
PROT SERPL-MCNC: 7.6 G/DL (ref 6.4–8.2)
PROTHROMBIN TIME: 18 SEC (ref 11.9–14.6)
RBC # BLD AUTO: 3.57 M/UL (ref 3.8–5.2)
SODIUM SERPL-SCNC: 131 MMOL/L (ref 136–145)
SODIUM SERPL-SCNC: 132 MMOL/L (ref 136–145)
TROPONIN I SERPL HS-MCNC: 16 NG/L (ref 0–51)
WBC # BLD AUTO: 5.7 K/UL (ref 3.6–11)

## 2024-12-22 PROCEDURE — 80053 COMPREHEN METABOLIC PANEL: CPT

## 2024-12-22 PROCEDURE — 99285 EMERGENCY DEPT VISIT HI MDM: CPT

## 2024-12-22 PROCEDURE — 85610 PROTHROMBIN TIME: CPT

## 2024-12-22 PROCEDURE — 6360000002 HC RX W HCPCS: Performed by: EMERGENCY MEDICINE

## 2024-12-22 PROCEDURE — 6370000000 HC RX 637 (ALT 250 FOR IP): Performed by: EMERGENCY MEDICINE

## 2024-12-22 PROCEDURE — 2060000000 HC ICU INTERMEDIATE R&B

## 2024-12-22 PROCEDURE — 70498 CT ANGIOGRAPHY NECK: CPT

## 2024-12-22 PROCEDURE — 36415 COLL VENOUS BLD VENIPUNCTURE: CPT

## 2024-12-22 PROCEDURE — 84484 ASSAY OF TROPONIN QUANT: CPT

## 2024-12-22 PROCEDURE — 94761 N-INVAS EAR/PLS OXIMETRY MLT: CPT

## 2024-12-22 PROCEDURE — 85025 COMPLETE CBC W/AUTO DIFF WBC: CPT

## 2024-12-22 PROCEDURE — 2500000003 HC RX 250 WO HCPCS: Performed by: INTERNAL MEDICINE

## 2024-12-22 PROCEDURE — 2580000003 HC RX 258: Performed by: EMERGENCY MEDICINE

## 2024-12-22 PROCEDURE — 80048 BASIC METABOLIC PNL TOTAL CA: CPT

## 2024-12-22 PROCEDURE — 6360000004 HC RX CONTRAST MEDICATION: Performed by: EMERGENCY MEDICINE

## 2024-12-22 PROCEDURE — 6370000000 HC RX 637 (ALT 250 FOR IP): Performed by: INTERNAL MEDICINE

## 2024-12-22 PROCEDURE — 96375 TX/PRO/DX INJ NEW DRUG ADDON: CPT

## 2024-12-22 PROCEDURE — 96374 THER/PROPH/DIAG INJ IV PUSH: CPT

## 2024-12-22 PROCEDURE — 70450 CT HEAD/BRAIN W/O DYE: CPT

## 2024-12-22 RX ORDER — 0.9 % SODIUM CHLORIDE 0.9 %
1000 INTRAVENOUS SOLUTION INTRAVENOUS ONCE
Status: COMPLETED | OUTPATIENT
Start: 2024-12-22 | End: 2024-12-22

## 2024-12-22 RX ORDER — HYDROCHLOROTHIAZIDE 12.5 MG/1
12.5 TABLET ORAL DAILY
Qty: 30 TABLET | Refills: 0 | Status: ON HOLD
Start: 2024-12-24 | End: 2024-12-24 | Stop reason: HOSPADM

## 2024-12-22 RX ORDER — HYDRALAZINE HYDROCHLORIDE 20 MG/ML
20 INJECTION INTRAMUSCULAR; INTRAVENOUS ONCE
Status: COMPLETED | OUTPATIENT
Start: 2024-12-22 | End: 2024-12-22

## 2024-12-22 RX ORDER — ONDANSETRON 4 MG/1
4 TABLET, ORALLY DISINTEGRATING ORAL ONCE
Status: COMPLETED | OUTPATIENT
Start: 2024-12-22 | End: 2024-12-22

## 2024-12-22 RX ORDER — ASPIRIN 300 MG/1
300 SUPPOSITORY RECTAL
Status: COMPLETED | OUTPATIENT
Start: 2024-12-22 | End: 2024-12-22

## 2024-12-22 RX ORDER — IOPAMIDOL 755 MG/ML
100 INJECTION, SOLUTION INTRAVASCULAR
Status: COMPLETED | OUTPATIENT
Start: 2024-12-22 | End: 2024-12-22

## 2024-12-22 RX ORDER — ONDANSETRON 2 MG/ML
4 INJECTION INTRAMUSCULAR; INTRAVENOUS ONCE
Status: COMPLETED | OUTPATIENT
Start: 2024-12-22 | End: 2024-12-22

## 2024-12-22 RX ADMIN — PANTOPRAZOLE SODIUM 40 MG: 40 TABLET, DELAYED RELEASE ORAL at 06:35

## 2024-12-22 RX ADMIN — APIXABAN 5 MG: 5 TABLET, FILM COATED ORAL at 08:34

## 2024-12-22 RX ADMIN — ONDANSETRON 4 MG: 2 INJECTION, SOLUTION INTRAMUSCULAR; INTRAVENOUS at 17:59

## 2024-12-22 RX ADMIN — ATORVASTATIN CALCIUM 10 MG: 20 TABLET, FILM COATED ORAL at 08:35

## 2024-12-22 RX ADMIN — SODIUM CHLORIDE 1000 ML: 9 INJECTION, SOLUTION INTRAVENOUS at 16:27

## 2024-12-22 RX ADMIN — HYDROCHLOROTHIAZIDE 25 MG: 25 TABLET ORAL at 08:34

## 2024-12-22 RX ADMIN — LISINOPRIL 20 MG: 20 TABLET ORAL at 08:35

## 2024-12-22 RX ADMIN — HYDRALAZINE HYDROCHLORIDE 20 MG: 20 INJECTION INTRAMUSCULAR; INTRAVENOUS at 17:00

## 2024-12-22 RX ADMIN — ONDANSETRON 4 MG: 4 TABLET, ORALLY DISINTEGRATING ORAL at 20:09

## 2024-12-22 RX ADMIN — HYDROMORPHONE HYDROCHLORIDE 0.5 MG: 1 INJECTION, SOLUTION INTRAMUSCULAR; INTRAVENOUS; SUBCUTANEOUS at 17:59

## 2024-12-22 RX ADMIN — METOPROLOL SUCCINATE 50 MG: 50 TABLET, EXTENDED RELEASE ORAL at 08:34

## 2024-12-22 RX ADMIN — SODIUM CHLORIDE, PRESERVATIVE FREE 10 ML: 5 INJECTION INTRAVENOUS at 08:35

## 2024-12-22 RX ADMIN — IOPAMIDOL 100 ML: 755 INJECTION, SOLUTION INTRAVENOUS at 15:35

## 2024-12-22 RX ADMIN — ASPIRIN 300 MG: 300 SUPPOSITORY RECTAL at 17:00

## 2024-12-22 ASSESSMENT — PAIN DESCRIPTION - LOCATION
LOCATION: ABDOMEN
LOCATION: HEAD

## 2024-12-22 ASSESSMENT — LIFESTYLE VARIABLES
HOW OFTEN DO YOU HAVE A DRINK CONTAINING ALCOHOL: NEVER
HOW MANY STANDARD DRINKS CONTAINING ALCOHOL DO YOU HAVE ON A TYPICAL DAY: PATIENT DOES NOT DRINK

## 2024-12-22 ASSESSMENT — PAIN SCALES - GENERAL
PAINLEVEL_OUTOF10: 8
PAINLEVEL_OUTOF10: 10
PAINLEVEL_OUTOF10: 0

## 2024-12-22 ASSESSMENT — PAIN - FUNCTIONAL ASSESSMENT
PAIN_FUNCTIONAL_ASSESSMENT: ACTIVITIES ARE NOT PREVENTED
PAIN_FUNCTIONAL_ASSESSMENT: ACTIVITIES ARE NOT PREVENTED
PAIN_FUNCTIONAL_ASSESSMENT: 0-10

## 2024-12-22 ASSESSMENT — PAIN DESCRIPTION - ONSET: ONSET: ON-GOING

## 2024-12-22 ASSESSMENT — PAIN DESCRIPTION - DESCRIPTORS
DESCRIPTORS: ACHING
DESCRIPTORS: DISCOMFORT

## 2024-12-22 ASSESSMENT — PAIN DESCRIPTION - FREQUENCY: FREQUENCY: CONTINUOUS

## 2024-12-22 ASSESSMENT — PAIN DESCRIPTION - PAIN TYPE: TYPE: ACUTE PAIN

## 2024-12-22 NOTE — PLAN OF CARE
Problem: Discharge Planning  Goal: Discharge to home or other facility with appropriate resources  12/21/2024 2229 by Ivet Mclain RN  Outcome: Progressing  12/21/2024 2229 by Ivet Mclain RN  Outcome: Progressing  Flowsheets  Taken 12/21/2024 2225  Discharge to home or other facility with appropriate resources: Identify barriers to discharge with patient and caregiver  Taken 12/21/2024 2015  Discharge to home or other facility with appropriate resources: Identify barriers to discharge with patient and caregiver     Problem: ABCDS Injury Assessment  Goal: Absence of physical injury  12/21/2024 2229 by Ivet Mclain RN  Outcome: Progressing  12/21/2024 2229 by Ivet Mclain RN  Outcome: Progressing     Problem: Safety - Adult  Goal: Free from fall injury  12/21/2024 2229 by Ivet Mclain RN  Outcome: Progressing  12/21/2024 2229 by Ivet Mclain RN  Outcome: Progressing     Problem: Pain  Goal: Verbalizes/displays adequate comfort level or baseline comfort level  Outcome: Progressing     Problem: Skin/Tissue Integrity  Goal: Absence of new skin breakdown  Description: 1.  Monitor for areas of redness and/or skin breakdown  2.  Assess vascular access sites hourly  3.  Every 4-6 hours minimum:  Change oxygen saturation probe site  4.  Every 4-6 hours:  If on nasal continuous positive airway pressure, respiratory therapy assess nares and determine need for appliance change or resting period.  Outcome: Progressing

## 2024-12-22 NOTE — ED TRIAGE NOTES
Pt brought in by family on her way from DC from a different hospital per family at 1445 pt started with slurred speech and dysarthria , code stroke was called at 1510

## 2024-12-22 NOTE — CARE COORDINATION
Case Management Discharge Note    Discharge Plan:  Anticipate DC to home today with no CM needs identified.    Transportation at DC:  Family    Please reach out to CM if any DC needs arise.    ______________________________________  ASHWIN Easton, RN-CM  [facility]- Care Management  Available via iQiyi  12/22/2024  1:24 PM

## 2024-12-22 NOTE — ED PROVIDER NOTES
[facility] EMERGENCY DEPT  EMERGENCY DEPARTMENT HISTORY AND PHYSICAL EXAM      Date: 12/22/2024  Patient Name: Latosha Todd  MRN: [id number]  YOB: 1940  Date of evaluation: 12/22/2024  Provider: Fely Pacheco MD   Note Started: 3:21 PM EST 12/22/24    HISTORY OF PRESENT ILLNESS     Chief Complaint   Patient presents with    Aphasia       History Provided By: Patient    HPI: Latosha Todd is a 84 y.o. female     PAST MEDICAL HISTORY   Past Medical History:  Past Medical History:   Diagnosis Date    Acute deep vein thrombosis (DVT) of distal vein of right lower extremity (HCC) 6/16/2020    Baker's cyst of knee     Left    Easy bruising     Essential hypertension 6/16/2020    Hypercholesteremia     Hypertension     Left arm pain     Neck pain     Osteoarthritis of both knees     Rapid pulse     Right elbow pain     Right hand pain     Right lateral epicondylitis     Swelling of joint of left wrist     Thoracic kyphosis     Cervical muscle stiffness    Wears glasses        Past Surgical History:  History reviewed. No pertinent surgical history.    Family History:  Family History   Problem Relation Age of Onset    Heart Disease Son        Social History:  Social History     Tobacco Use    Smoking status: Never    Smokeless tobacco: Never   Substance Use Topics    Alcohol use: Yes     Comment: socially    Drug use: Never       Allergies:  Allergies   Allergen Reactions    Morphine      Other reaction(s): Not Reported This Time    Procaine Hives and Other (See Comments)     Fast heartbeat    Prochlorperazine Other (See Comments)     Dizziness, made me \"crazy\"    Epinephrine Other (See Comments) and Palpitations     Fast heartbeat    Shellfish Allergy Diarrhea and Nausea And Vomiting       PCP: Edil Campoverde, APRN - NP    Current Meds:   No current facility-administered medications for this encounter.     Current Outpatient Medications   Medication Sig Dispense Refill    [START ON 12/24/2024]  the Last Year: No   Interpersonal Safety: Not At Risk (12/20/2024)    Interpersonal Safety Domain Source: IP Abuse Screening     Physical abuse: Denies     Verbal abuse: Denies     Emotional abuse: Denies     Financial abuse: Denies     Sexual abuse: Denies   Utilities: Not At Risk (12/20/2024)    [facility] Utilities     Threatened with loss of utilities: No       PHYSICAL EXAM   Physical Exam  Vitals and nursing note reviewed.   Constitutional:       General: She is not in acute distress.     Appearance: Normal appearance. She is normal weight. She is not ill-appearing, toxic-appearing or diaphoretic.   HENT:      Head: Normocephalic and atraumatic.      Nose: Nose normal.      Mouth/Throat:      Mouth: Mucous membranes are moist.      Pharynx: No oropharyngeal exudate.   Eyes:      Extraocular Movements: Extraocular movements intact.      Conjunctiva/sclera: Conjunctivae normal.      Pupils: Pupils are equal, round, and reactive to light.   Cardiovascular:      Rate and Rhythm: Normal rate and regular rhythm.      Pulses: Normal pulses.      Heart sounds: Normal heart sounds.   Pulmonary:      Effort: Pulmonary effort is normal.      Breath sounds: Normal breath sounds.   Abdominal:      General: Bowel sounds are normal.      Palpations: Abdomen is soft.      Tenderness: There is no abdominal tenderness.   Musculoskeletal:         General: Normal range of motion.      Cervical back: Normal range of motion and neck supple.   Skin:     General: Skin is warm and dry.      Capillary Refill: Capillary refill takes less than 2 seconds.   Neurological:      General: No focal deficit present.      Mental Status: She is alert and oriented to person, place, and time.      Cranial Nerves: Cranial nerves 2-12 are intact. No cranial nerve deficit.      Sensory: Sensation is intact. No sensory deficit.      Motor: Motor function is intact. No weakness.      Coordination: Coordination is intact. Coordination normal.   Psychiatric:

## 2024-12-22 NOTE — ED NOTES
Patient began having expressive aphasia again.  ED physician Dr. Pacheco to bedside to evaluate patient.  BP currently 207/107.

## 2024-12-22 NOTE — DISCHARGE SUMMARY
as needed      Follow-up tests or labs: As noted above    Discharge Condition: Stable    Disposition: home    Time taken to co-ordinate and arrange discharge:  more than 30 minutes.    Signed:  Rickey Figueroa MD       12/22/2024   2:49 PM

## 2024-12-22 NOTE — ED NOTES
Chaperoned Tele-neurology consult.  Neurologist recommending transfer for MRI service.  Family requesting Jem[facility].

## 2024-12-23 ENCOUNTER — APPOINTMENT (OUTPATIENT)
Facility: HOSPITAL | Age: 84
DRG: 078 | End: 2024-12-23
Payer: MEDICARE

## 2024-12-23 ENCOUNTER — TELEPHONE (OUTPATIENT)
Facility: CLINIC | Age: 84
End: 2024-12-23

## 2024-12-23 ENCOUNTER — TELEPHONE (OUTPATIENT)
Age: 84
End: 2024-12-23

## 2024-12-23 LAB
ALBUMIN SERPL-MCNC: 3.6 G/DL (ref 3.5–5)
ALBUMIN/GLOB SERPL: 1.1 (ref 1.1–2.2)
ALP SERPL-CCNC: 78 U/L (ref 45–117)
ALT SERPL-CCNC: 13 U/L (ref 12–78)
ANION GAP SERPL CALC-SCNC: 8 MMOL/L (ref 2–12)
APPEARANCE UR: CLEAR
AST SERPL-CCNC: 22 U/L (ref 15–37)
BACTERIA URNS QL MICRO: NEGATIVE /HPF
BASOPHILS # BLD: 0 K/UL (ref 0–0.1)
BASOPHILS NFR BLD: 1 % (ref 0–1)
BILIRUB SERPL-MCNC: 0.8 MG/DL (ref 0.2–1)
BILIRUB UR QL: NEGATIVE
BUN SERPL-MCNC: 27 MG/DL (ref 6–20)
BUN/CREAT SERPL: 18 (ref 12–20)
CALCIUM SERPL-MCNC: 9.3 MG/DL (ref 8.5–10.1)
CHLORIDE SERPL-SCNC: 101 MMOL/L (ref 97–108)
CHOLEST SERPL-MCNC: 162 MG/DL
CO2 SERPL-SCNC: 23 MMOL/L (ref 21–32)
COLOR UR: ABNORMAL
COMMENT:: NORMAL
CREAT SERPL-MCNC: 1.49 MG/DL (ref 0.55–1.02)
CRP SERPL-MCNC: <0.29 MG/DL (ref 0–0.3)
DIFFERENTIAL METHOD BLD: ABNORMAL
EOSINOPHIL # BLD: 0 K/UL (ref 0–0.4)
EOSINOPHIL NFR BLD: 0 % (ref 0–7)
EPITH CASTS URNS QL MICRO: ABNORMAL /LPF
ERYTHROCYTE [DISTWIDTH] IN BLOOD BY AUTOMATED COUNT: 13.1 % (ref 11.5–14.5)
EST. AVERAGE GLUCOSE BLD GHB EST-MCNC: 103 MG/DL
FOLATE SERPL-MCNC: 56.1 NG/ML (ref 5–21)
GLOBULIN SER CALC-MCNC: 3.4 G/DL (ref 2–4)
GLUCOSE SERPL-MCNC: 97 MG/DL (ref 65–100)
GLUCOSE UR STRIP.AUTO-MCNC: NEGATIVE MG/DL
HBA1C MFR BLD: 5.2 % (ref 4–5.6)
HCT VFR BLD AUTO: 34.9 % (ref 35–47)
HDLC SERPL-MCNC: 47 MG/DL
HDLC SERPL: 3.4 (ref 0–5)
HGB BLD-MCNC: 11.5 G/DL (ref 11.5–16)
HGB UR QL STRIP: NEGATIVE
HYALINE CASTS URNS QL MICRO: ABNORMAL /LPF (ref 0–5)
IMM GRANULOCYTES # BLD AUTO: 0 K/UL (ref 0–0.04)
IMM GRANULOCYTES NFR BLD AUTO: 1 % (ref 0–0.5)
KETONES UR QL STRIP.AUTO: ABNORMAL MG/DL
LDLC SERPL CALC-MCNC: 97.8 MG/DL (ref 0–100)
LEUKOCYTE ESTERASE UR QL STRIP.AUTO: ABNORMAL
LYMPHOCYTES # BLD: 1.6 K/UL (ref 0.8–3.5)
LYMPHOCYTES NFR BLD: 27 % (ref 12–49)
MAGNESIUM SERPL-MCNC: 2.1 MG/DL (ref 1.6–2.4)
MCH RBC QN AUTO: 34.6 PG (ref 26–34)
MCHC RBC AUTO-ENTMCNC: 33 G/DL (ref 30–36.5)
MCV RBC AUTO: 105.1 FL (ref 80–99)
MONOCYTES # BLD: 0.5 K/UL (ref 0–1)
MONOCYTES NFR BLD: 8 % (ref 5–13)
NEUTS SEG # BLD: 3.9 K/UL (ref 1.8–8)
NEUTS SEG NFR BLD: 65 % (ref 32–75)
NITRITE UR QL STRIP.AUTO: NEGATIVE
NRBC # BLD: 0 K/UL (ref 0–0.01)
NRBC BLD-RTO: 0 PER 100 WBC
NT PRO BNP: 1267 PG/ML
PH UR STRIP: 5.5 (ref 5–8)
PHOSPHATE SERPL-MCNC: 4.3 MG/DL (ref 2.6–4.7)
PLATELET # BLD AUTO: 261 K/UL (ref 150–400)
PMV BLD AUTO: 12.9 FL (ref 8.9–12.9)
POTASSIUM SERPL-SCNC: 3.9 MMOL/L (ref 3.5–5.1)
PROT SERPL-MCNC: 7 G/DL (ref 6.4–8.2)
PROT UR STRIP-MCNC: NEGATIVE MG/DL
RBC # BLD AUTO: 3.32 M/UL (ref 3.8–5.2)
RBC #/AREA URNS HPF: ABNORMAL /HPF (ref 0–5)
SODIUM SERPL-SCNC: 132 MMOL/L (ref 136–145)
SP GR UR REFRACTOMETRY: 1.02 (ref 1–1.03)
SPECIMEN HOLD: NORMAL
TRIGL SERPL-MCNC: 86 MG/DL
TROPONIN I SERPL HS-MCNC: 23 NG/L (ref 0–51)
TROPONIN I SERPL HS-MCNC: 24 NG/L (ref 0–51)
TSH SERPL DL<=0.05 MIU/L-ACNC: 1.3 UIU/ML (ref 0.36–3.74)
URINE CULTURE IF INDICATED: ABNORMAL
UROBILINOGEN UR QL STRIP.AUTO: 0.2 EU/DL (ref 0.2–1)
VIT B12 SERPL-MCNC: 1335 PG/ML (ref 193–986)
VLDLC SERPL CALC-MCNC: 17.2 MG/DL
WBC # BLD AUTO: 6.1 K/UL (ref 3.6–11)
WBC URNS QL MICRO: ABNORMAL /HPF (ref 0–4)

## 2024-12-23 PROCEDURE — 2060000000 HC ICU INTERMEDIATE R&B

## 2024-12-23 PROCEDURE — 81001 URINALYSIS AUTO W/SCOPE: CPT

## 2024-12-23 PROCEDURE — 6370000000 HC RX 637 (ALT 250 FOR IP): Performed by: INTERNAL MEDICINE

## 2024-12-23 PROCEDURE — 97161 PT EVAL LOW COMPLEX 20 MIN: CPT

## 2024-12-23 PROCEDURE — 99223 1ST HOSP IP/OBS HIGH 75: CPT | Performed by: STUDENT IN AN ORGANIZED HEALTH CARE EDUCATION/TRAINING PROGRAM

## 2024-12-23 PROCEDURE — 83880 ASSAY OF NATRIURETIC PEPTIDE: CPT

## 2024-12-23 PROCEDURE — 36415 COLL VENOUS BLD VENIPUNCTURE: CPT

## 2024-12-23 PROCEDURE — 2580000003 HC RX 258: Performed by: INTERNAL MEDICINE

## 2024-12-23 PROCEDURE — 70551 MRI BRAIN STEM W/O DYE: CPT

## 2024-12-23 PROCEDURE — 83735 ASSAY OF MAGNESIUM: CPT

## 2024-12-23 PROCEDURE — 80061 LIPID PANEL: CPT

## 2024-12-23 PROCEDURE — 97535 SELF CARE MNGMENT TRAINING: CPT

## 2024-12-23 PROCEDURE — 2500000003 HC RX 250 WO HCPCS: Performed by: INTERNAL MEDICINE

## 2024-12-23 PROCEDURE — 82746 ASSAY OF FOLIC ACID SERUM: CPT

## 2024-12-23 PROCEDURE — 85025 COMPLETE CBC W/AUTO DIFF WBC: CPT

## 2024-12-23 PROCEDURE — 86140 C-REACTIVE PROTEIN: CPT

## 2024-12-23 PROCEDURE — 6360000002 HC RX W HCPCS: Performed by: INTERNAL MEDICINE

## 2024-12-23 PROCEDURE — 83036 HEMOGLOBIN GLYCOSYLATED A1C: CPT

## 2024-12-23 PROCEDURE — 84100 ASSAY OF PHOSPHORUS: CPT

## 2024-12-23 PROCEDURE — 84484 ASSAY OF TROPONIN QUANT: CPT

## 2024-12-23 PROCEDURE — 84443 ASSAY THYROID STIM HORMONE: CPT

## 2024-12-23 PROCEDURE — 82607 VITAMIN B-12: CPT

## 2024-12-23 PROCEDURE — 80053 COMPREHEN METABOLIC PANEL: CPT

## 2024-12-23 PROCEDURE — 71250 CT THORAX DX C-: CPT

## 2024-12-23 PROCEDURE — 97165 OT EVAL LOW COMPLEX 30 MIN: CPT

## 2024-12-23 PROCEDURE — 97530 THERAPEUTIC ACTIVITIES: CPT

## 2024-12-23 RX ORDER — SODIUM CHLORIDE 0.9 % (FLUSH) 0.9 %
5-40 SYRINGE (ML) INJECTION PRN
Status: DISCONTINUED | OUTPATIENT
Start: 2024-12-23 | End: 2024-12-24 | Stop reason: HOSPADM

## 2024-12-23 RX ORDER — ONDANSETRON 2 MG/ML
4 INJECTION INTRAMUSCULAR; INTRAVENOUS EVERY 6 HOURS PRN
Status: DISCONTINUED | OUTPATIENT
Start: 2024-12-23 | End: 2024-12-24 | Stop reason: HOSPADM

## 2024-12-23 RX ORDER — POLYETHYLENE GLYCOL 3350 17 G/17G
17 POWDER, FOR SOLUTION ORAL DAILY PRN
Status: DISCONTINUED | OUTPATIENT
Start: 2024-12-23 | End: 2024-12-24 | Stop reason: HOSPADM

## 2024-12-23 RX ORDER — METOPROLOL TARTRATE 1 MG/ML
2.5 INJECTION, SOLUTION INTRAVENOUS EVERY 6 HOURS PRN
Status: DISCONTINUED | OUTPATIENT
Start: 2024-12-23 | End: 2024-12-24 | Stop reason: HOSPADM

## 2024-12-23 RX ORDER — SODIUM CHLORIDE 0.9 % (FLUSH) 0.9 %
5-40 SYRINGE (ML) INJECTION EVERY 12 HOURS SCHEDULED
Status: DISCONTINUED | OUTPATIENT
Start: 2024-12-23 | End: 2024-12-24 | Stop reason: HOSPADM

## 2024-12-23 RX ORDER — ONDANSETRON 4 MG/1
4 TABLET, ORALLY DISINTEGRATING ORAL EVERY 8 HOURS PRN
Status: DISCONTINUED | OUTPATIENT
Start: 2024-12-23 | End: 2024-12-24 | Stop reason: HOSPADM

## 2024-12-23 RX ORDER — SODIUM CHLORIDE 9 MG/ML
INJECTION, SOLUTION INTRAVENOUS PRN
Status: DISCONTINUED | OUTPATIENT
Start: 2024-12-23 | End: 2024-12-24 | Stop reason: HOSPADM

## 2024-12-23 RX ORDER — LORAZEPAM 1 MG/1
1 TABLET ORAL DAILY PRN
Status: DISCONTINUED | OUTPATIENT
Start: 2024-12-23 | End: 2024-12-24 | Stop reason: HOSPADM

## 2024-12-23 RX ORDER — LORAZEPAM 2 MG/ML
1 INJECTION INTRAMUSCULAR ONCE
Status: COMPLETED | OUTPATIENT
Start: 2024-12-23 | End: 2024-12-23

## 2024-12-23 RX ORDER — OXYCODONE HYDROCHLORIDE 5 MG/1
5 TABLET ORAL EVERY 4 HOURS PRN
Status: DISCONTINUED | OUTPATIENT
Start: 2024-12-23 | End: 2024-12-24 | Stop reason: HOSPADM

## 2024-12-23 RX ORDER — ACETAMINOPHEN 325 MG/1
650 TABLET ORAL EVERY 4 HOURS PRN
Status: DISCONTINUED | OUTPATIENT
Start: 2024-12-23 | End: 2024-12-24 | Stop reason: HOSPADM

## 2024-12-23 RX ORDER — PANTOPRAZOLE SODIUM 40 MG/1
40 TABLET, DELAYED RELEASE ORAL
Status: DISCONTINUED | OUTPATIENT
Start: 2024-12-23 | End: 2024-12-24 | Stop reason: HOSPADM

## 2024-12-23 RX ORDER — ATORVASTATIN CALCIUM 20 MG/1
10 TABLET, FILM COATED ORAL DAILY
Status: DISCONTINUED | OUTPATIENT
Start: 2024-12-23 | End: 2024-12-23

## 2024-12-23 RX ORDER — SODIUM CHLORIDE 9 MG/ML
INJECTION, SOLUTION INTRAVENOUS CONTINUOUS
Status: DISPENSED | OUTPATIENT
Start: 2024-12-23 | End: 2024-12-23

## 2024-12-23 RX ORDER — ASPIRIN 300 MG/1
300 SUPPOSITORY RECTAL DAILY
Status: DISCONTINUED | OUTPATIENT
Start: 2024-12-23 | End: 2024-12-23

## 2024-12-23 RX ORDER — ASPIRIN 81 MG/1
81 TABLET, CHEWABLE ORAL DAILY
Status: DISCONTINUED | OUTPATIENT
Start: 2024-12-23 | End: 2024-12-23

## 2024-12-23 RX ORDER — ATORVASTATIN CALCIUM 20 MG/1
20 TABLET, FILM COATED ORAL DAILY
Status: DISCONTINUED | OUTPATIENT
Start: 2024-12-24 | End: 2024-12-24 | Stop reason: HOSPADM

## 2024-12-23 RX ADMIN — APIXABAN 2.5 MG: 5 TABLET, FILM COATED ORAL at 22:03

## 2024-12-23 RX ADMIN — APIXABAN 2.5 MG: 5 TABLET, FILM COATED ORAL at 03:08

## 2024-12-23 RX ADMIN — PANTOPRAZOLE SODIUM 40 MG: 40 TABLET, DELAYED RELEASE ORAL at 06:25

## 2024-12-23 RX ADMIN — LORAZEPAM 1 MG: 2 INJECTION INTRAMUSCULAR; INTRAVENOUS at 04:54

## 2024-12-23 RX ADMIN — SODIUM CHLORIDE, PRESERVATIVE FREE 10 ML: 5 INJECTION INTRAVENOUS at 22:04

## 2024-12-23 RX ADMIN — ATORVASTATIN CALCIUM 10 MG: 20 TABLET, FILM COATED ORAL at 09:19

## 2024-12-23 RX ADMIN — SODIUM CHLORIDE: 9 INJECTION, SOLUTION INTRAVENOUS at 02:57

## 2024-12-23 RX ADMIN — SODIUM CHLORIDE, PRESERVATIVE FREE 10 ML: 5 INJECTION INTRAVENOUS at 09:25

## 2024-12-23 RX ADMIN — ASPIRIN 81 MG CHEWABLE TABLET 81 MG: 81 TABLET CHEWABLE at 09:19

## 2024-12-23 ASSESSMENT — PAIN SCALES - GENERAL
PAINLEVEL_OUTOF10: 0

## 2024-12-23 NOTE — PROGRESS NOTES
Physical Therapy 12/23/2024         Consult received and appreciated. Attempted evaluation but patient received ativan for MRI overnight and BP running low this AM, see flowsheets. Defer until BP improves. Will continue to follow patient and see when able/appropriate.     Brandon Maosn, PT

## 2024-12-23 NOTE — CARE COORDINATION
Care Management Initial Assessment       RUR: 18%  Readmission? Yes - Discharged home 12/22 from [facility]  1st IM letter given? Yes - 12/23    ROSS: Home with family support  - PT/OT cayla pending, unable to work with pt with low bp   Transport: Family     CM met with patient and her daughter Rachael at bedside to introduce self and role. Pt lives alone in 1 level home, 3 steps to enter.     ADLs: independent   DME: shower chair, cane   PCP follow up: Dr. Albin Pearson  Previous Home Health: None - Pt is open to home health if recommended, does not have preference of provider  Previous Skilled Nursing Facility: none  Previous Inpatient Rehab: none  Insurance verified: Yes, Medicare A&B and [place] supplement   Pharmacy: [facility] [place]   Emergency Contact: Dtr Rachael Todd [phone]    CM will follow patient progress and assist as needed with ROSS plan.       12/23/24 1115   Service Assessment   Patient Orientation Alert and Oriented;Person;Place;Self;Situation   Cognition Alert   History Provided By Patient   Primary Caregiver Self   Accompanied By/Relationship dtr   Support Systems Children   Patient's Healthcare Decision Maker is: Legal Next of Kin   PCP Verified by CM Yes  (Dr. Albin Pearson)   Last Visit to PCP Within last 3 months   Prior Functional Level Independent in ADLs/IADLs   Current Functional Level Independent in ADLs/IADLs   Can patient return to prior living arrangement Yes   Ability to make needs known: Good   Family able to assist with home care needs: Yes   Financial Resources Medicare   Social/Functional History   Lives With Alone   Type of Home House   Home Layout One level   Home Access Stairs to enter with rails   Entrance Stairs - Number of Steps 3   Bathroom Equipment Shower chair   Home Equipment Cane - Quad   Prior Level of Assist for ADLs Independent   Prior Level of Assist for Homemaking Independent   Homemaking Responsibilities Yes   Ambulation Assistance Independent   Prior

## 2024-12-23 NOTE — ED NOTES
TRANSFER - OUT REPORT:    Verbal report given to Veronica RN on Latosha FUNEZ Suits  being transferred to Bon RN for routine progression of patient care       Report consisted of patient's Situation, Background, Assessment and   Recommendations(SBAR).     Information from the following report(s) Nurse Handoff Report, Index, ED Encounter Summary, ED SBAR, MAR, and Recent Results was reviewed with the receiving nurse.    [place] Fall Assessment:    Presents to emergency department  because of falls (Syncope, seizure, or loss of consciousness): No  Age > 70: Yes  Altered Mental Status, Intoxication with alcohol or substance confusion (Disorientation, impaired judgment, poor safety awaremess, or inability to follow instructions): Yes  Impaired Mobility: Ambulates or transfers with assistive devices or assistance; Unable to ambulate or transer.: Yes  Nursing Judgement: Yes          Lines:   Peripheral IV 12/22/24 Proximal;Right;Anterior Forearm (Active)        Opportunity for questions and clarification was provided.      Patient transported with:  Monitor and Registered Nurse

## 2024-12-23 NOTE — PROGRESS NOTES
Occupational therapy  12/23/24     OT eval order received and acknowledged. Chart reviewed and discussed with nursing, patient received ativan for MRI overnight and BP running low this AM, see flowsheets. Defer until BP improves. Will continue to follow patient and see when able.    Thank you,  Ana Amezquita, OTR/L

## 2024-12-23 NOTE — ED PROVIDER NOTES
Transfer Documentation   7:57 PM   Evaluated the patient upon arrival. Stable for admission by hospitalist. Patient will board in the ED under his/her/their care as arranged by transferring ED provider. Accepting provider/service has been made aware of patient's arrival.       Carlos Tariq MD  12/22/24 2000

## 2024-12-23 NOTE — CONSULTS
Neurology Consult Note     NAME: Latosha Todd   :  1940   MRN:  [id number]   DATE:  2024    Assessment and Plan:     83 yo F h/o Afib on Eliquis, CKD, RA, HLD, HTN presenting with speech changes, described as gibberish and word finding difficulty on , though patient feels that she knew what she wanted to say. Discharged earlier that day after being hospitalized for intermittent confusion and hypertension, felt to be consistent with hypertensive encephalopathy. In ED, BP candace to 207/107 and patient became symptomatic again. Cr also increased to 2.35 (baseline 1.3-1.5). CT neuroimaging showed no acute findings. MRI brain limited by anterior artifact but otherwise no evidence of stroke. On exam, patient is very talkative and requires some redirection, also has mild generalized weakness but otherwise within normal limits. Presentation most consistent with encephalopathy due to hypertension and [place], though difficult to fully exclude CVA given MRI artifact obscuring anterior frontal lobes. Regardless, patient is on appropriate therapy.    - Continue Eliquis (dosing per primary team)  - Change statin to atorvastatin 20  - Recommend urinalysis  - A1c 5.2, LDL 98  - TTE  without thrombus  - PT/OT/SLP as needed  - Follow up with Dr. Bach (neurology) outpatient    We will sign off at this time.    Subjective   HPI:  Latosha Todd is a 84 y.o. female who presents with slurred speech. Neurology was consulted for c/f CVA. History obtained per patient and chart review.    Ms. Todd presents due to slurred speech. Recently admitted at [facility] due to confusion and tremors, felt to be due to HTN encephalopathy. MRI showed no acute infarct though limited by dental artifact. Discharged  but then daughter brought her back when she had slurred speech.    Temp (24hrs), Av.1 °F (36.7 °C), Min:97.8 °F (36.6 °C), Max:98.3 °F (36.8 °C)      Physical Exam:   General: Elderly patient in no apparent distress.   Pulmonary: No increased WOB  Cardiac: Regular rate and rhythm  Extremities: No cyanosis or edema    Neurological Exam:  Mental Status: Oriented to time, place and person. Speech and language intact. Attention and fund of knowledge appropriate.  Normal recent and remote memory.   Cranial Nerves:   VFF, PERRL, EOMI, no nystagmus, no diplopia, no ptosis. Facial sensation is normal. Facial movement is symmetric.  Palate is midline. Tongue is midline. Hearing is intact. Normal SCM strength.   Motor:  At least 4/5 strength in upper and lower proximal and distal muscles. Normal bulk and tone. No tremors or abnormal movements appreciated.   Reflexes:      Sensory:   Intact to LT    Gait:     Cerebellar:  Intact FTN         STUDIES AND REPORTS:  Recent Results (from the past 24 hour(s))   CBC with Auto Differential    Collection Time: 24  3:24 PM   Result Value Ref Range    WBC 5.7 3.6 - 11.0 K/uL    RBC 3.57 (L) 3.80 - 5.20 M/uL    Hemoglobin 12.6 11.5 - 16.0 g/dL    Hematocrit 36.7 35.0 - 47.0 %    .8 (H) 80.0 - 99.0 FL    MCH 35.3 (H) 26.0 - 34.0 PG    MCHC 34.3 30.0 - 36.5 g/dL    RDW 13.2 11.5 - 14.5 %    Platelets 263 150 - 400 K/uL    MPV 12.5 8.9 - 12.9 FL    Nucleated RBCs 0.0 0.0  WBC    nRBC 0.00 0.00 - 0.01 K/uL    Neutrophils % 52 32 - 75 %    Lymphocytes % 35 12 - 49 %    Monocytes % 10 5 - 13 %    Eosinophils % 1 0 - 7 %    Basophils % 1 0 - 1 %    Immature Granulocytes % 1 (H) 0 - 0.5 %    Neutrophils Absolute 2.9 1.8 - 8.0 K/UL    Lymphocytes Absolute 2.0 0.8 - 3.5 K/UL    Monocytes Absolute 0.6 0.0 - 1.0 K/UL    Eosinophils Absolute 0.1 0.0 - 0.4 K/UL    Basophils Absolute 0.1 0.0 - 0.1 K/UL    Immature Granulocytes Absolute 0.1 (H) 0.00 - 0.04 K/UL    Differential Type AUTOMATED     Comprehensive Metabolic Panel    Collection Time:

## 2024-12-23 NOTE — H&P
History and Physical    Date of Service:  12/23/2024  Primary Care Provider: Edil Campoverde APRN - NP  Source of information: The patient, patient's daughter at bedside and review of EMR    Chief Complaint: Cerebrovascular Accident      History of Presenting Illness:   Latosha Todd is a 84 y.o. female with past medical history significant for CKD, paroxysmal atrial fibrillation on Eliquis for anticoagulation, DVT right lower extremity, rheumatoid arthritis, dyslipidemia, pretension, recent diagnosis of benign essential tremor presented at the [facility] emergency department with difficulty with speech.  Patient was recently admitted to [facility] from 12/19/2024 to 12/22/2024, patient was admitted for evaluation and treatment of hypertensive urgency, underwent adjustment of her antihypertensive medication and discharge from the hospital.  Patient's daughter was driving the patient back home from [facility] when she developed sudden onset of difficulty with speech and the daughter took the patient to [facility] emergency department.  Code stroke was activated on arrival at the emergency room.  CT scan of the head without contrast was negative.  CTA of the head and neck did not show large vessel occlusion.  The difficulty with speech initially resolved and then reoccurred and patient was noted to have elevated blood pressure with this episode of slurred speech.  She was seen by teleneurologist who advised admission to the hospital for stroke workup including MRI of the brain.  Patient was transferred to [facility] for MRI of the brain and higher level of care.         REVIEW OF SYSTEMS:  REVIEW OF SYSTEMS:  HEAD, EYES, EARS, NOSE AND THROAT:  No headache.  No dizziness.  No blurring of vision.  No photophobia.  RESPIRATORY SYSTEM:  No shortness of breath.  No cough.  No hemoptysis.  CARDIOVASCULAR SYSTEM:  No chest pain.  No orthopnea.  No  POA: We will continue Eliquis for anticoagulation.    7.  Rheumatoid arthritis POA: Patient is stable.  Will check C-reactive protein level.  She will continue treatment with Tremfya upon discharge from the hospital.    8.  Benign essential tremor POA: Supportive therapy while awaiting further recommendation from the neurology service.    9.  Dyslipidemia POA: We will continue Lipitor.    10.  Essential hypertension POA: We will hold antihypertensive medications to allow for permissive hypertension for the next 24 to 48 hours.  Will monitor blood pressure closely.    11.  Right pulmonary nodule POA: This is based on CTA of the head result.  Will obtain CT scan of the chest for further evaluation.  Pulmonary consult will be requested to assist in further evaluation and treatment.        DIET: ADULT DIET; Regular; Low Fat/Low Chol/High Fiber/ZAHIDA; 1200 ml   ISOLATION PRECAUTIONS: No active isolations  CODE STATUS: Full Code   Central Line:   None  DVT PROPHYLAXIS: Eliquis  FUNCTIONAL STATUS PRIOR TO HOSPITALIZATION: Ambulatory and capable of self-care but relies on assistive devices (rolling walker/cane).   Ambulatory status/function: Ambulates with assistance:  Cane   EARLY MOBILITY ASSESSMENT: Recommend an assessment from physical therapy and/or occupational therapy  ANTICIPATED DISCHARGE: Greater than 48 hours.  ANTICIPATED DISPOSITION: Home with Home Healthcare  EMERGENCY CONTACT/SURROGATE DECISION MAKER: Rachael Todd child    CRITICAL CARE WAS PERFORMED FOR THIS ENCOUNTER: NO.      Signed By: Chantelle Amezquita MD     December 23, 2024         Please note that this dictation may have been completed with Dragon, the computer voice recognition software.  Quite often unanticipated grammatical, syntax, homophones, and other interpretive errors are inadvertently transcribed by the computer software.  Please disregard these errors.  Please excuse any errors that have escaped final proofreading.

## 2024-12-23 NOTE — TELEPHONE ENCOUNTER
----- Message from Dr. Kiran Bach MD sent at 12/9/2024  7:40 PM EST -----  The brain MRI has shown non specific changes in the micro-circulation or the small blood vessels which are chronic and mostly associated with HTN, DM, high cholesterol, with ageing. No mass lesions seen.  The blood vessels of the head and neck are patent.

## 2024-12-23 NOTE — PLAN OF CARE
Oral Care: Stand-by Assistance and Standing at sink  - Washing Hands : Stand-by Assistance and Standing at sink    Toileting: .  - Bladder Hygiene : Stand-by Assistance and Standing   - Clothing Management : Minimal Assistance and Standing        Lower Extremity Dressing: .  - Socks : Stand-by Assistance and Seated EOB  - Legs Crossed Method Used : Yes            The patient’s plan of care was discussed with: Physical Therapist and Registered Nurse.    Patient was educated regarding her deficit(s) of impaired balance as this relates to her diagnosis of CVA rule out.  She demonstrated good  understanding as evidenced by insight into deficits.    Patient and/or family was verbally educated on the BE FAST acronym for signs/symptoms of CVA and TIA. BE FAST was written on patient's communication board  for visual education and reinforcement.  All questions answered with patient indicating good understanding.     [facility]-PAC \"6 Clicks\"                                                       Daily Activity Inpatient Short Form  AM-PAC Daily Activity - Inpatient   How much help is needed for putting on and taking off regular lower body clothing?: A Little  How much help is needed for bathing (which includes washing, rinsing, drying)?: A Little  How much help is needed for toileting (which includes using toilet, bedpan, or urinal)?: A Little  How much help is needed for putting on and taking off regular upper body clothing?: A Little  How much help is needed for taking care of personal grooming?: A Little  How much help for eating meals?: None  AM-PAC Inpatient Daily Activity Raw Score: 19  AM-PAC Inpatient ADL T-Scale Score : 40.22  ADL Inpatient CMS 0-100% Score: 42.8  ADL Inpatient CMS G-Code Modifier : CK     Interpretation of Tool:  Represents clinically-significant functional categories (i.e. Activities of daily living).  Cutoff score 39.4 (19) correlates to a good likelihood of discharging home versus a

## 2024-12-23 NOTE — PLAN OF CARE
Problem: Physical Therapy - Adult  Goal: By Discharge: Performs mobility at highest level of function for planned discharge setting.  See evaluation for individualized goals.  Description: FUNCTIONAL STATUS PRIOR TO ADMISSION: Patient was independent and active without use of DME.    HOME SUPPORT PRIOR TO ADMISSION: The patient lived alone with family to provide assistance.    Physical Therapy Goals  Initiated 12/23/2024  1.  Patient will move from supine to sit and sit to supine, scoot up and down, and roll side to side in bed with independence within 7 day(s).    2.  Patient will perform sit to stand with modified independence within 7 day(s).  3.  Patient will transfer from bed to chair and chair to bed with modified independence using the least restrictive device within 7 day(s).  4.  Patient will ambulate with supervision/set-up for 150 feet with the least restrictive device within 7 day(s).   5.  Patient will ascend/descend 3 stairs with no handrail(s) with contact guard assist within 7 day(s).  6.  Patient will improve [place] Balance score by 7 points within 7 days.   Outcome: Progressing       PHYSICAL THERAPY EVALUATION    Patient: Latosha Todd (84 y.o. female)  Date: 12/23/2024  Primary Diagnosis: Stroke-like symptom [R29.90]  Acute CVA (cerebrovascular accident) (HCC) [I63.9]  Acute renal failure superimposed on chronic kidney disease, unspecified acute renal failure type, unspecified CKD stage (HCC) [N17.9, N18.9]       Precautions: Restrictions/Precautions: Fall Risk                      ASSESSMENT : Patient is an 83 yo female with a h/o Afib on Eliquis, CKD, RA, HLD, HTN presenting with speech changes, described as gibberish and word finding difficulty on 12/22, though patient feels that she knew what she wanted to say. Discharged earlier that day after being hospitalized for intermittent confusion and hypertension, felt to be consistent with hypertensive encephalopathy. In ED, BP candace to 207/107

## 2024-12-23 NOTE — PROGRESS NOTES
SLP Contact Note    Consult received and chart reviewed in preparation for evaluation. Patient being worked-up for stroke. CT negative for acute infarct.  NIH 0. Pt passed swallow screen and has been initiated on diet.  Therefore, will await MRI and follow up for evaluation as indicated.    Thank you,  MARSHALL Alejandro.Ed, CCC-SLP (pronouns: she/her/hers)  Speech-Language Pathologist        Hospital Course  Patient present to [facility] ED w/ difficulty speaking  Swallow Screen: Pass    Prandial Aspiration Risk Factors: acute neurologic changes  Silent Aspiration Risk Factors:  none apparent on chart review  Post-Prandial Aspiration Risk Factors: history of reflux      Pertinent Imaging and Labs:  CT Head: negative  CTA Head and Neck: No LVO   MRI: pending     WBC: 6.1   Other important labs or tests:     Previously seen by SLP:  n/a  Reason for consult: stroke/TIA    Stroke:  Admitting NIHSS score: 2  Current NIHSS score: 0  TICI Score: n/a  ICH Score: n/a

## 2024-12-23 NOTE — PROGRESS NOTES
Hospitalist Progress Note  Joey Wilson MD  Answering service: [phone]        Date of Service:  2024  NAME:  Latosha Todd  :  1940  MRN:  [id number]      Admission Summary:     Latosha Todd is a 84 y.o. female with past medical history significant for CKD, paroxysmal atrial fibrillation on Eliquis for anticoagulation, DVT right lower extremity, rheumatoid arthritis, dyslipidemia, pretension, recent diagnosis of benign essential tremor presented at the [facility] emergency department with difficulty with speech.  Patient was recently admitted to [facility] from 2024 to 2024, patient was admitted for evaluation and treatment of hypertensive urgency, underwent adjustment of her antihypertensive medication and discharge from the hospital.  Patient's daughter was driving the patient back home from [facility] when she developed sudden onset of difficulty with speech and the daughter took the patient to [facility] emergency department.  Code stroke was activated on arrival at the emergency room.  CT scan of the head without contrast was negative.  CTA of the head and neck did not show large vessel occlusion.  The difficulty with speech initially resolved and then reoccurred and patient was noted to have elevated blood pressure with this episode of slurred speech.  She was seen by teleneurologist who advised admission to the hospital for stroke workup including MRI of the brain.  Patient was transferred to [facility] for MRI of the brain and higher level of care.    Interval history / Subjective:   Patient with PMH CKD PAF DVT on eliquis RA presented for speech difficulty.  Recent  - admit at [facility] for hypertensive urgency . Upon dc on way back home developed dysarthria for which she returned to [facility] for further evaluation. On presentation she  132* 132*   K 3.5 4.3 3.9   CL 97 93* 101   CO2 29 28 23   BUN 29* 35* 27*   MG  --   --  2.1   PHOS  --   --  4.3     Recent Labs     12/22/24  1524 12/23/24  0325   ALT 21 13   GLOB 3.4 3.4     Recent Labs     12/22/24  1524   INR 1.5*      No results for input(s): \"TIBC\" in the last 72 hours.    Invalid input(s): \"FE\", \"PSAT\", \"FERR\"   No results found for: \"RBCF\"   No results for input(s): \"PH\", \"PCO2\", \"PO2\" in the last 72 hours.  No results for input(s): \"CPK\" in the last 72 hours.    Invalid input(s): \"CPKMB\", \"CKNDX\", \"TROIQ\"  Lab Results   Component Value Date/Time    CHOL 162 12/23/2024 03:25 AM    HDL 47 12/23/2024 03:25 AM    LDL 97.8 12/23/2024 03:25 AM    LDL 76.2 01/05/2021 08:32 AM     No results found for: \"GLUCPOC\"  [unfilled]      Medications Reviewed:     Current Facility-Administered Medications   Medication Dose Route Frequency    apixaban (ELIQUIS) tablet 2.5 mg  2.5 mg Oral BID    pantoprazole (PROTONIX) tablet 40 mg  40 mg Oral QAM AC    LORazepam (ATIVAN) tablet 1 mg  1 mg Oral Daily PRN    atorvastatin (LIPITOR) tablet 10 mg  10 mg Oral Daily    sodium chloride flush 0.9 % injection 5-40 mL  5-40 mL IntraVENous 2 times per day    sodium chloride flush 0.9 % injection 5-40 mL  5-40 mL IntraVENous PRN    0.9 % sodium chloride infusion   IntraVENous PRN    ondansetron (ZOFRAN-ODT) disintegrating tablet 4 mg  4 mg Oral Q8H PRN    Or    ondansetron (ZOFRAN) injection 4 mg  4 mg IntraVENous Q6H PRN    polyethylene glycol (GLYCOLAX) packet 17 g  17 g Oral Daily PRN    aspirin chewable tablet 81 mg  81 mg Oral Daily    Or    aspirin suppository 300 mg  300 mg Rectal Daily    acetaminophen (TYLENOL) tablet 650 mg  650 mg Oral Q4H PRN    0.9 % sodium chloride infusion   IntraVENous Continuous    oxyCODONE (ROXICODONE) immediate release tablet 5 mg  5 mg Oral Q4H PRN     ______________________________________________________________________  EXPECTED LENGTH OF STAY: 3  ACTUAL LENGTH OF STAY:

## 2024-12-23 NOTE — ED TRIAGE NOTES
Patient arrives as a transfer from [facility]. Patient was dx with TIA at other facility and is at [facility] for MRI.     Patient was experiencing garbled and slurred speech PTA that has resolved now.

## 2024-12-23 NOTE — PROGRESS NOTES
Speech Pathology Note    Reviewed chart and note SLP orders received as part of stroke order set. Pertinent medical information below. Discussed case with RN who reported no SLP-related concerns    Given pertinent medical information as below and discussion with RN, formal SLP evaluation not clinically indicated at this time. Will sign off. Please re-consult if further needs arise. Thank you.    Chief Complaint   Patient presents with    Cerebrovascular Accident     Presenting symptoms: difficulty with speech (resolved)    RN flowsheet documentation   [facility]ONNIE NURSING DYSPHAGIA SCREEN NIHSS   Swallow Screening  Is the patient unable to remain alert for testing?: No  Is the patient on a modified diet (thickened liquids) due to pre-existing dysphagia?: No  Is there presence of existing enteral tube feeding via the stomach or nose?: No  Is there presence of head-of-bed restrictions (less than 30 degrees)?: No  Is there presence of tracheotomy tube?: No  Is the patient ordered nothing-by-mouth status?: No  3 oz Water Swallow Screen: Pass      Current diet:ADULT DIET; Regular; Low Fat/Low Chol/High Fiber/ZAHIDA; 1200 ml NIHSS Stroke Scale  NIH Stroke Scale Assessed: Yes  Interval: Hand-off/Transfer  Level of Consciousness (1a): Alert  LOC Questions (1b): Answers both correctly  LOC Commands (1c): Performs both tasks correctly  Best Gaze (2): Normal  Visual (3): No visual loss  Facial Palsy (4): Normal symmetrical movement  Motor Arm, Left (5a): No drift  Motor Arm, Right (5b): No drift  Motor Leg, Left (6a): No drift  Motor Leg, Right (6b): No drift  Limb Ataxia (7): Absent  Sensory (8): Normal  Best Language (9): No aphasia  Dysarthria (10): Normal  Extinction and Inattention (11): No abnormality  Total: 0     Head imaging   CT HEAD RESULTS MRI RESULTS   CT Head WO Contrast:   Results for orders placed during the hospital encounter of 12/22/24    CT HEAD WO CONTRAST    Narrative  EXAM: CT CODE NEURO HEAD WO

## 2024-12-23 NOTE — CONSULTS
Hypertension     Left arm pain     Neck pain     Osteoarthritis of both knees     Rapid pulse     Right elbow pain     Right hand pain     Right lateral epicondylitis     Swelling of joint of left wrist     Thoracic kyphosis     Cervical muscle stiffness    Wears glasses       No past surgical history on file.   Prior to Admission medications    Medication Sig Start Date End Date Taking? Authorizing Provider   hydroCHLOROthiazide 12.5 MG tablet Take 1 tablet by mouth daily Take this medication which you already have 12/24/24 1/23/25 Yes Rickey Figueroa MD   lisinopril (PRINIVIL;ZESTRIL) 20 MG tablet Take 1 tablet by mouth daily 12/21/24 1/20/25 Yes Rickey Figueroa MD   metoprolol succinate (TOPROL XL) 50 MG extended release tablet Take 1 tablet by mouth daily 12/22/24  Yes Rickey Figueroa MD   Biotin 1 MG CAPS Take by mouth   Yes ProviderCipriano MD   apixaban (ELIQUIS) 5 MG TABS tablet Take 1 tablet by mouth 2 times daily 8/31/21  Yes Automatic Reconciliation, Ar   esomeprazole (NEXIUM) 20 MG delayed release capsule Take 1 capsule by mouth daily 3/18/22  Yes Automatic Reconciliation, Ar   simvastatin (ZOCOR) 10 MG tablet TAKE 1 TABLET DAILY 3/18/22  Yes Automatic Reconciliation, Ar   guselkumab (TREMFYA) 100 MG/ML SOSY injection Inject into the skin    ProviderCipriano MD   turmeric (QC TUMERIC COMPLEX) 500 MG CAPS Take by mouth daily    Cipriano Woodward MD   Cranberry 50 MG CHEW Take by mouth    Cipriano Woodward MD   Calcium Carb-Cholecalciferol (CALCIUM PLUS D3 ABSORBABLE PO) Take by mouth    Cipriano Woodward MD   LORazepam (ATIVAN) 1 MG tablet Take 1 tablet by mouth daily as needed. 3/26/21   Automatic Reconciliation, Ar     Allergies   Allergen Reactions    Morphine      Other reaction(s): Not Reported This Time    Procaine Hives and Other (See Comments)     Fast heartbeat    Prochlorperazine Other (See Comments)     Dizziness, made me \"crazy\"    Epinephrine Other (See Comments)  4 mg  4 mg Oral Q8H PRN    Or    ondansetron (ZOFRAN) injection 4 mg  4 mg IntraVENous Q6H PRN    polyethylene glycol (GLYCOLAX) packet 17 g  17 g Oral Daily PRN    acetaminophen (TYLENOL) tablet 650 mg  650 mg Oral Q4H PRN    0.9 % sodium chloride infusion   IntraVENous Continuous    oxyCODONE (ROXICODONE) immediate release tablet 5 mg  5 mg Oral Q4H PRN    [START ON 12/24/2024] atorvastatin (LIPITOR) tablet 20 mg  20 mg Oral Daily       Labs:  ABG Invalid input(s): \"ABG\"     CBC Recent Labs     12/21/24  0434 12/22/24  1524 12/23/24  0325   WBC 3.9 5.7 6.1   HGB 11.7 12.6 11.5   HCT 35.8 36.7 34.9*    263 261   .5* 102.8* 105.1*   MCH 34.8* 35.3* 34.6*        Metabolic  Panel Recent Labs     12/22/24  0139 12/22/24  1524 12/23/24  0325   * 132* 132*   K 3.5 4.3 3.9   CL 97 93* 101   CO2 29 28 23   BUN 29* 35* 27*   MG  --   --  2.1   PHOS  --   --  4.3   ALT  --  21 13   INR  --  1.5*  --         Pertinent Labs                Beni Hung PA-C  12/23/2024

## 2024-12-24 VITALS
SYSTOLIC BLOOD PRESSURE: 119 MMHG | TEMPERATURE: 98.3 F | DIASTOLIC BLOOD PRESSURE: 52 MMHG | WEIGHT: 200.5 LBS | HEIGHT: 63 IN | RESPIRATION RATE: 16 BRPM | BODY MASS INDEX: 35.53 KG/M2 | HEART RATE: 81 BPM | OXYGEN SATURATION: 97 %

## 2024-12-24 LAB
ANION GAP SERPL CALC-SCNC: 5 MMOL/L (ref 2–12)
BASOPHILS # BLD: 0.1 K/UL (ref 0–0.1)
BASOPHILS NFR BLD: 2 % (ref 0–1)
BUN SERPL-MCNC: 29 MG/DL (ref 6–20)
BUN/CREAT SERPL: 22 (ref 12–20)
CALCIUM SERPL-MCNC: 9.3 MG/DL (ref 8.5–10.1)
CHLORIDE SERPL-SCNC: 103 MMOL/L (ref 97–108)
CO2 SERPL-SCNC: 27 MMOL/L (ref 21–32)
CREAT SERPL-MCNC: 1.33 MG/DL (ref 0.55–1.02)
DIFFERENTIAL METHOD BLD: ABNORMAL
EOSINOPHIL # BLD: 0.1 K/UL (ref 0–0.4)
EOSINOPHIL NFR BLD: 3 % (ref 0–7)
ERYTHROCYTE [DISTWIDTH] IN BLOOD BY AUTOMATED COUNT: 13.1 % (ref 11.5–14.5)
GLUCOSE SERPL-MCNC: 97 MG/DL (ref 65–100)
HCT VFR BLD AUTO: 31.2 % (ref 35–47)
HGB BLD-MCNC: 10.2 G/DL (ref 11.5–16)
IMM GRANULOCYTES # BLD AUTO: 0 K/UL (ref 0–0.04)
IMM GRANULOCYTES NFR BLD AUTO: 1 % (ref 0–0.5)
LYMPHOCYTES # BLD: 1.4 K/UL (ref 0.8–3.5)
LYMPHOCYTES NFR BLD: 35 % (ref 12–49)
MAGNESIUM SERPL-MCNC: 2.1 MG/DL (ref 1.6–2.4)
MCH RBC QN AUTO: 34.1 PG (ref 26–34)
MCHC RBC AUTO-ENTMCNC: 32.7 G/DL (ref 30–36.5)
MCV RBC AUTO: 104.3 FL (ref 80–99)
MONOCYTES # BLD: 0.4 K/UL (ref 0–1)
MONOCYTES NFR BLD: 11 % (ref 5–13)
NEUTS SEG # BLD: 1.9 K/UL (ref 1.8–8)
NEUTS SEG NFR BLD: 50 % (ref 32–75)
NRBC # BLD: 0 K/UL (ref 0–0.01)
NRBC BLD-RTO: 0 PER 100 WBC
PLATELET # BLD AUTO: 185 K/UL (ref 150–400)
POTASSIUM SERPL-SCNC: 3.8 MMOL/L (ref 3.5–5.1)
RBC # BLD AUTO: 2.99 M/UL (ref 3.8–5.2)
SODIUM SERPL-SCNC: 135 MMOL/L (ref 136–145)
TROPONIN I SERPL HS-MCNC: 21 NG/L (ref 0–51)
WBC # BLD AUTO: 3.9 K/UL (ref 3.6–11)

## 2024-12-24 PROCEDURE — 85025 COMPLETE CBC W/AUTO DIFF WBC: CPT

## 2024-12-24 PROCEDURE — 84484 ASSAY OF TROPONIN QUANT: CPT

## 2024-12-24 PROCEDURE — 80048 BASIC METABOLIC PNL TOTAL CA: CPT

## 2024-12-24 PROCEDURE — P9047 ALBUMIN (HUMAN), 25%, 50ML: HCPCS

## 2024-12-24 PROCEDURE — 6360000002 HC RX W HCPCS

## 2024-12-24 PROCEDURE — 2500000003 HC RX 250 WO HCPCS: Performed by: INTERNAL MEDICINE

## 2024-12-24 PROCEDURE — 97535 SELF CARE MNGMENT TRAINING: CPT

## 2024-12-24 PROCEDURE — 97530 THERAPEUTIC ACTIVITIES: CPT

## 2024-12-24 PROCEDURE — 6370000000 HC RX 637 (ALT 250 FOR IP): Performed by: INTERNAL MEDICINE

## 2024-12-24 PROCEDURE — 6370000000 HC RX 637 (ALT 250 FOR IP): Performed by: STUDENT IN AN ORGANIZED HEALTH CARE EDUCATION/TRAINING PROGRAM

## 2024-12-24 PROCEDURE — 83735 ASSAY OF MAGNESIUM: CPT

## 2024-12-24 RX ORDER — ALBUMIN (HUMAN) 12.5 G/50ML
25 SOLUTION INTRAVENOUS ONCE
Status: COMPLETED | OUTPATIENT
Start: 2024-12-24 | End: 2024-12-24

## 2024-12-24 RX ORDER — ATORVASTATIN CALCIUM 20 MG/1
20 TABLET, FILM COATED ORAL DAILY
Qty: 30 TABLET | Refills: 0 | Status: SHIPPED | OUTPATIENT
Start: 2024-12-25

## 2024-12-24 RX ORDER — LISINOPRIL 20 MG/1
10 TABLET ORAL DAILY
Qty: 15 TABLET | Refills: 0 | Status: SHIPPED | OUTPATIENT
Start: 2024-12-24 | End: 2025-01-23

## 2024-12-24 RX ORDER — METOPROLOL TARTRATE 25 MG/1
12.5 TABLET, FILM COATED ORAL 2 TIMES DAILY
Qty: 30 TABLET | Refills: 0 | Status: SHIPPED | OUTPATIENT
Start: 2024-12-24

## 2024-12-24 RX ADMIN — APIXABAN 2.5 MG: 5 TABLET, FILM COATED ORAL at 08:56

## 2024-12-24 RX ADMIN — ATORVASTATIN CALCIUM 20 MG: 20 TABLET, FILM COATED ORAL at 08:56

## 2024-12-24 RX ADMIN — SODIUM CHLORIDE, PRESERVATIVE FREE 10 ML: 5 INJECTION INTRAVENOUS at 08:57

## 2024-12-24 RX ADMIN — PANTOPRAZOLE SODIUM 40 MG: 40 TABLET, DELAYED RELEASE ORAL at 06:30

## 2024-12-24 RX ADMIN — ALBUMIN (HUMAN) 25 G: 0.25 INJECTION, SOLUTION INTRAVENOUS at 00:37

## 2024-12-24 NOTE — PLAN OF CARE
Problem: Physical Therapy - Adult  Goal: By Discharge: Performs mobility at highest level of function for planned discharge setting.  See evaluation for individualized goals.  Description: FUNCTIONAL STATUS PRIOR TO ADMISSION: Patient was independent and active without use of DME.    HOME SUPPORT PRIOR TO ADMISSION: The patient lived alone with family to provide assistance.    Physical Therapy Goals  Initiated 12/23/2024  1.  Patient will move from supine to sit and sit to supine, scoot up and down, and roll side to side in bed with independence within 7 day(s).    2.  Patient will perform sit to stand with modified independence within 7 day(s).  3.  Patient will transfer from bed to chair and chair to bed with modified independence using the least restrictive device within 7 day(s).  4.  Patient will ambulate with supervision/set-up for 150 feet with the least restrictive device within 7 day(s).   5.  Patient will ascend/descend 3 stairs with no handrail(s) with contact guard assist within 7 day(s).  6.  Patient will improve [place] Balance score by 7 points within 7 days.   12/24/2024 1412 by Brandon Mason, PT  Outcome: Progressing     Problem: Occupational Therapy - Adult  Goal: By Discharge: Performs self-care activities at highest level of function for planned discharge setting.  See evaluation for individualized goals.  Description: FUNCTIONAL STATUS PRIOR TO ADMISSION:  Patient was ambulatory using no DME and was independent for ADLs/IADLs.      Prior Level of Assist for ADLs: Independent, Prior Level of Assist for Homemaking: Independent, Ambulation Assistance: Independent, Prior Level of Assist for Transfers: Independent, Active : Yes       HOME SUPPORT: Patient lived alone with her daughter to provide assistance.     Occupational Therapy Goals  Initiated 12/23/2024   1.  Patient will perform grooming in standing with [place] within 7 day(s).  2.  Patient will perform lower body dressing  bones(Osteoporosis,prev fx,steroid use,metastatic bone ca/age(85 years old or older)/coagulation(Bleeding disorder R/T clinical cond/anti-coags)

## 2024-12-24 NOTE — PLAN OF CARE
Problem: Chronic Conditions and Co-morbidities  Goal: Patient's chronic conditions and co-morbidity symptoms are monitored and maintained or improved  12/24/2024 1012 by Rosana Grove, RN  Outcome: Progressing  Flowsheets (Taken 12/24/2024 0800)  Care Plan - Patient's Chronic Conditions and Co-Morbidity Symptoms are Monitored and Maintained or Improved:   Monitor and assess patient's chronic conditions and comorbid symptoms for stability, deterioration, or improvement   Collaborate with multidisciplinary team to address chronic and comorbid conditions and prevent exacerbation or deterioration   Update acute care plan with appropriate goals if chronic or comorbid symptoms are exacerbated and prevent overall improvement and discharge  12/24/2024 0152 by Mary Beth Tillman RN  Outcome: Progressing  Flowsheets (Taken 12/23/2024 2000)  Care Plan - Patient's Chronic Conditions and Co-Morbidity Symptoms are Monitored and Maintained or Improved:   Monitor and assess patient's chronic conditions and comorbid symptoms for stability, deterioration, or improvement   Collaborate with multidisciplinary team to address chronic and comorbid conditions and prevent exacerbation or deterioration   Update acute care plan with appropriate goals if chronic or comorbid symptoms are exacerbated and prevent overall improvement and discharge     Problem: Discharge Planning  Goal: Discharge to home or other facility with appropriate resources  12/24/2024 1012 by Rosana Grove, RN  Outcome: Progressing  Flowsheets (Taken 12/24/2024 0800)  Discharge to home or other facility with appropriate resources:   Identify barriers to discharge with patient and caregiver   Arrange for needed discharge resources and transportation as appropriate   Identify discharge learning needs (meds, wound care, etc)   Refer to discharge planning if patient needs post-hospital services based on physician order or complex needs related to functional status, cognitive

## 2024-12-24 NOTE — PLAN OF CARE
Problem: Occupational Therapy - Adult  Goal: By Discharge: Performs self-care activities at highest level of function for planned discharge setting.  See evaluation for individualized goals.  Description: FUNCTIONAL STATUS PRIOR TO ADMISSION:  Patient was ambulatory using no DME and was independent for ADLs/IADLs.      Prior Level of Assist for ADLs: Independent, Prior Level of Assist for Homemaking: Independent, Ambulation Assistance: Independent, Prior Level of Assist for Transfers: Independent, Active : Yes       HOME SUPPORT: Patient lived alone with her daughter to provide assistance.     Occupational Therapy Goals  Initiated 12/23/2024   1.  Patient will perform grooming in standing with [place] within 7 day(s).  2.  Patient will perform lower body dressing with [place] within 7 day(s).  3.  Patient will perform bathing with [place] within 7 day(s).  4.  Patient will perform toilet transfers with [place] within 7 day(s).  5.  Patient will perform all aspects of toileting with [place] within 7 day(s).  6.  Patient will participate in upper extremity therapeutic exercise/activities with [place] within 7 day(s).    7.  Patient will utilize energy conservation techniques during functional activities with verbal cues within 7 day(s).  8.  Patient will participate in Fugl Duarte assessment in prep for ADLs within 7 days.  Outcome: Progressing   OCCUPATIONAL THERAPY TREATMENT  Patient: Latosha Todd (84 y.o. female)  Date: 12/24/2024  Primary Diagnosis: Stroke-like symptom [R29.90]  Acute CVA (cerebrovascular accident) (HCC) [I63.9]  Acute renal failure superimposed on chronic kidney disease, unspecified acute renal failure type, unspecified CKD stage (HCC) [N17.9, N18.9]       Precautions: Fall Risk                Chart, occupational therapy assessment, plan of care, and goals were reviewed.    ASSESSMENT  Patient continues to benefit from skilled OT services and is progressing

## 2024-12-24 NOTE — PLAN OF CARE
Problem: Chronic Conditions and Co-morbidities  Goal: Patient's chronic conditions and co-morbidity symptoms are monitored and maintained or improved  Outcome: Progressing  Flowsheets (Taken 12/23/2024 2000)  Care Plan - Patient's Chronic Conditions and Co-Morbidity Symptoms are Monitored and Maintained or Improved:   Monitor and assess patient's chronic conditions and comorbid symptoms for stability, deterioration, or improvement   Collaborate with multidisciplinary team to address chronic and comorbid conditions and prevent exacerbation or deterioration   Update acute care plan with appropriate goals if chronic or comorbid symptoms are exacerbated and prevent overall improvement and discharge     Problem: Discharge Planning  Goal: Discharge to home or other facility with appropriate resources  Outcome: Progressing  Flowsheets (Taken 12/23/2024 2000)  Discharge to home or other facility with appropriate resources:   Identify barriers to discharge with patient and caregiver   Arrange for needed discharge resources and transportation as appropriate   Identify discharge learning needs (meds, wound care, etc)   Arrange for interpreters to assist at discharge as needed   Refer to discharge planning if patient needs post-hospital services based on physician order or complex needs related to functional status, cognitive ability or social support system     Problem: Safety - Adult  Goal: Free from fall injury  Outcome: Progressing  Flowsheets (Taken 12/23/2024 2000)  Free From Fall Injury:   Based on caregiver fall risk screen, instruct family/caregiver to ask for assistance with transferring infant if caregiver noted to have fall risk factors   Instruct family/caregiver on patient safety     Problem: ABCDS Injury Assessment  Goal: Absence of physical injury  Outcome: Progressing  Flowsheets (Taken 12/23/2024 2000)  Absence of Physical Injury: Implement safety measures based on patient assessment     Problem: Physical  within 7 day(s).  3.  Patient will perform bathing with [place] within 7 day(s).  4.  Patient will perform toilet transfers with [place] within 7 day(s).  5.  Patient will perform all aspects of toileting with [place] within 7 day(s).  6.  Patient will participate in upper extremity therapeutic exercise/activities with [place] within 7 day(s).    7.  Patient will utilize energy conservation techniques during functional activities with verbal cues within 7 day(s).  8.  Patient will participate in Fugl Duarte assessment in prep for ADLs within 7 days.  12/23/2024 1501 by Ana Amezquita, OT  Outcome: Progressing

## 2024-12-24 NOTE — PLAN OF CARE
Problem: Physical Therapy - Adult  Goal: By Discharge: Performs mobility at highest level of function for planned discharge setting.  See evaluation for individualized goals.  Description: FUNCTIONAL STATUS PRIOR TO ADMISSION: Patient was independent and active without use of DME.    HOME SUPPORT PRIOR TO ADMISSION: The patient lived alone with family to provide assistance.    Physical Therapy Goals  Initiated 12/23/2024  1.  Patient will move from supine to sit and sit to supine, scoot up and down, and roll side to side in bed with independence within 7 day(s).    2.  Patient will perform sit to stand with modified independence within 7 day(s).  3.  Patient will transfer from bed to chair and chair to bed with modified independence using the least restrictive device within 7 day(s).  4.  Patient will ambulate with supervision/set-up for 150 feet with the least restrictive device within 7 day(s).   5.  Patient will ascend/descend 3 stairs with no handrail(s) with contact guard assist within 7 day(s).  6.  Patient will improve [place] Balance score by 7 points within 7 days.   Outcome: Progressing       PHYSICAL THERAPY TREATMENT    Patient: Latosha Todd (84 y.o. female)  Date: 12/24/2024  Diagnosis: Stroke-like symptom [R29.90]  Acute CVA (cerebrovascular accident) ([facility]) [I63.9]  Acute renal failure superimposed on chronic kidney disease, unspecified acute renal failure type, unspecified CKD stage ([facility]) [N17.9, N18.9] Acute CVA (cerebrovascular accident) ([facility])      Precautions: Fall Risk                      ASSESSMENT:  Patient continues to benefit from skilled PT services and is progressing towards goals. Patient demonstrated improved tolerance for ambulation - using rolling walker for increased stability. Patient able to ascend/descend 2 steps with handrail with stand by assist. Patient performed standing balance activity with supervision while reaching across midline for functional task.   Vital signs stable  throughout.          PLAN:  Patient continues to benefit from skilled intervention to address the above impairments.  Continue treatment per established plan of care.    Recommendations for staff mobility and toileting assistance:  Recommend that staff completes patient mobility with assist x1 using rolling walker.      Recommendation for discharge: (in order for the patient to meet his/her long term goals):   Intermittent physical therapy up to 2-3x/week in previous living setting with additional support needed for safety    Other factors to consider for discharge: lives alone    IF patient discharges home will need the following DME: rolling walker       SUBJECTIVE:   Patient stated, \"I feel fine.\"    OBJECTIVE DATA SUMMARY:   Critical Behavior:          Functional Mobility Training:  Bed Mobility:  Bed Mobility Training  Rolling: Independent  Supine to Sit: Supervision  Scooting: Independent  Transfers:  Transfer Training  Transfer Training: Yes  Sit to Stand: Supervision  Stand to Sit: Supervision  Stand Pivot Transfers: Supervision  Balance:  Balance  Sitting: Intact  Standing: Impaired  Standing - Static: Good  Standing - Dynamic: Good   Ambulation/Gait Training:     Gait  Gait Training: Yes  Overall Level of Assistance: Supervision  Distance (ft): 400 Feet  Assistive Device: Walker, rolling  Rail Use: Right  Stairs - Level of Assistance: Supervision  Number of Stairs Trained: 2                    Pain Rating:  No complaints of pain  Pain Intervention(s):       Activity Tolerance:   Good    After treatment:   Patient left in no apparent distress sitting up in chair, Call bell within reach, Bed/ chair alarm activated, and Caregiver / family present      COMMUNICATION/EDUCATION:   The patient's plan of care was discussed with: registered nurse    Patient Education  Education Given To: Patient;Family  Education Provided: Role of Therapy;Plan of Care  Education Method: Verbal  Barriers to Learning:

## 2024-12-24 NOTE — DISCHARGE INSTRUCTIONS
Discharge Instructions       PATIENT ID: Latosha Todd  MRN: [id number]   YOB: 1940    DATE OF ADMISSION: 12/22/2024   DATE OF DISCHARGE: 12/24/2024    PRIMARY CARE PROVIDER: Albin Pearson     ATTENDING PHYSICIAN: Joey Wilson MD   DISCHARGING PROVIDER: Joey Wilson MD    To contact this individual call [phone] and ask the  to page.   If unavailable ask to be transferred the Adult Hospitalist Department.    DISCHARGE DIAGNOSES     # acute metabolic encephalopathy   #CHADWICK on Chronic kidney disease stage III /resolved   #Essential hypertension  #hypotension /requiring IV fluids & albumin   #Paroxysmal atrial fibrillation  #DVT of the right lower extremity   continue Eliquis   #Hyponatremia /resolved   #Rheumatoid arthritis  #Dyslipidemia : continue Lipitor.  #Right upper lobe pulmonary nodule  Mixed solid and groundglass nodule in the RUL. Follow-up chest CT is recommended  in 3 to 6 months if the patient would be a candidate for adenocarcinoma  Treatment/per CT chest wo contrast  12/23     CONSULTATIONS: Pulmonology                                   Neurology         FOLLOW UP APPOINTMENTS:    PCP Cardiology & pulmonology neurology     ADDITIONAL CARE RECOMMENDATIONS:   Closer home Blood pressure monitoring   Keep log of blood pressure   Resume blood pressure medications as advised to avoid hypotension or hypertension     DIET: regular diet      ACTIVITY: activity as tolerated      EQUIPMENT needed: none       DISCHARGE MEDICATIONS:   See Medication Reconciliation Form    It is important that you take the medication exactly as they are prescribed.   Keep your medication in the bottles provided by the pharmacist and keep a list of the medication names, dosages, and times to be taken in your wallet.   Do not take other medications without consulting your doctor.       NOTIFY YOUR PHYSICIAN FOR ANY OF THE FOLLOWING:   Fever over 101 degrees for 24 hours.   Chest pain,

## 2024-12-24 NOTE — DISCHARGE SUMMARY
Physician Discharge Summary***           Pt Name  Latosha Todd   Admit date:  12/22/2024   Discharge date and time:  12/24/24 ***   Room Number  678/01    Medical Record Number  [id number] @ [facility]   Age  84 y.o.   Date of Birth 1940   PCP Albin Pearson MD     Admission Diagnoses:                        Acute CVA (cerebrovascular accident) (HCC)   Present on Admission:   Acute CVA (cerebrovascular accident) (HCC)       Allergies   Allergen Reactions    Morphine      Other reaction(s): Not Reported This Time    Procaine Hives and Other (See Comments)     Fast heartbeat    Prochlorperazine Other (See Comments)     Dizziness, made me \"crazy\"    Epinephrine Other (See Comments) and Palpitations     Fast heartbeat    Shellfish Allergy Diarrhea and Nausea And Vomiting        Excerpt from HPI : ***       Hospital Course: This pt was admitted with  Acute CVA (cerebrovascular accident) (HCC) on 12/22/2024.  ***  Present on Admission:   Acute CVA (cerebrovascular accident) (HCC)      ***       Treatment team Treatment Team:   Joey Wilson MD Ransom, MD Anabell Roblero, Beni BERNARDO, PA-C  Olu, MD Doc Gutiérrez, Bernice Terrell, Rosana Snowden, RN  Marlon, Brandon Norman, PT  Ana Amezquita, OT   Consultations  IP CONSULT TO NEUROLOGY  IP CONSULT TO CASE MANAGEMENT  IP CONSULT TO STROKE COORDINATOR  IP CONSULT TO PULMONOLOGY  IP CONSULT TO CASE MANAGEMENT   Procedures/Surgeries  * No surgery found ****     Condition at the time of discharge  Improved and stable***       Query  None noted today ***       Disposition {[facility] BLANK LIST:[id number]::\"Home with family, no needs\",\"Home with family and home health services\",\"SNF/LTC\",\"IP Rehab\",\"Home with hospice services\",\"IP Hospice\",\"***\"}   Diet {diet:78971}   Care Plan discussed with {care plan discussed with:[id number]}   Follow up Follow-up Information       Follow up With Specialties Details Why Contact Info    Edil Campoverde

## 2024-12-24 NOTE — CARE COORDINATION
Received request via: Pharmacy    Was the patient seen in the last year in this department? Yes    Does the patient have an active prescription (recently filled or refills available) for medication(s) requested? yes    Does the patient have assisted Plus and need 100 day supply (blood pressure, diabetes and cholesterol meds only)? Patient does not have SCP   Requested Prescriptions     Pending Prescriptions Disp Refills    acyclovir (ZOVIRAX) 400 MG tablet [Pharmacy Med Name: Acyclovir 400 MG Oral Tablet] 90 Tablet 0     Sig: TAKE 1 TABLET BY MOUTH THREE TIMES DAILY       Transition of Care Plan:    Home with family support  - home health PT/OT/SN - [facility]    Transport: family     RUR: 20%  Prior Level of Functioning: independent  Disposition: home health   CHELSEA: 12/24  Follow up appointments: PCP  DME needed: RW - ordered through [employer] via [employer]   Transportation at discharge: dtr   IM/IMM Medicare/ letter given: 12/23  Caregiver Contact: Rachael Todd (Child)  [phone] (Mobile)   Discharge Caregiver contacted prior to discharge?   Care Conference needed? No  Barriers to discharge: Medical, monitor BP and hopeful dc later today per hospitalist    PT/OT recommending home health and RW. Pt in agreement, no preference of home health agency. Pt from [facility], referrals sent to [facility], [place] and [facility].    RW ordered through [employer] via [employer].    10:30am: [facility] does not service area. CM spoke with [facility] - they asked for referral to be faxed to [phone] for review.    11:30am: [facility] is able to accept Pt for SOC 12/26. Will fax DC summary when available. Follow up info placed on AVS.    ROSALBA Guerin (Ally).

## 2024-12-30 ENCOUNTER — OFFICE VISIT (OUTPATIENT)
Age: 84
End: 2024-12-30
Payer: MEDICARE

## 2024-12-30 ENCOUNTER — HOSPITAL ENCOUNTER (EMERGENCY)
Facility: HOSPITAL | Age: 84
Discharge: HOME | End: 2024-12-30
Attending: EMERGENCY MEDICINE
Payer: MEDICARE

## 2024-12-30 VITALS
WEIGHT: 180 LBS | RESPIRATION RATE: 16 BRPM | HEIGHT: 64 IN | DIASTOLIC BLOOD PRESSURE: 79 MMHG | TEMPERATURE: 97 F | SYSTOLIC BLOOD PRESSURE: 142 MMHG | OXYGEN SATURATION: 98 % | BODY MASS INDEX: 30.73 KG/M2 | HEART RATE: 79 BPM

## 2024-12-30 VITALS
RESPIRATION RATE: 14 BRPM | OXYGEN SATURATION: 96 % | TEMPERATURE: 97.3 F | BODY MASS INDEX: 30.76 KG/M2 | HEART RATE: 65 BPM | DIASTOLIC BLOOD PRESSURE: 84 MMHG | SYSTOLIC BLOOD PRESSURE: 223 MMHG | WEIGHT: 179.2 LBS

## 2024-12-30 DIAGNOSIS — I10 ASYMPTOMATIC HYPERTENSION: Primary | ICD-10-CM

## 2024-12-30 DIAGNOSIS — H53.8 BLURRING OF VISION: Primary | ICD-10-CM

## 2024-12-30 DIAGNOSIS — G43.109 MIGRAINE WITH AURA AND WITHOUT STATUS MIGRAINOSUS, NOT INTRACTABLE: ICD-10-CM

## 2024-12-30 PROCEDURE — G8428 CUR MEDS NOT DOCUMENT: HCPCS | Performed by: STUDENT IN AN ORGANIZED HEALTH CARE EDUCATION/TRAINING PROGRAM

## 2024-12-30 PROCEDURE — 1111F DSCHRG MED/CURRENT MED MERGE: CPT | Performed by: STUDENT IN AN ORGANIZED HEALTH CARE EDUCATION/TRAINING PROGRAM

## 2024-12-30 PROCEDURE — 1090F PRES/ABSN URINE INCON ASSESS: CPT | Performed by: STUDENT IN AN ORGANIZED HEALTH CARE EDUCATION/TRAINING PROGRAM

## 2024-12-30 PROCEDURE — 1126F AMNT PAIN NOTED NONE PRSNT: CPT | Performed by: STUDENT IN AN ORGANIZED HEALTH CARE EDUCATION/TRAINING PROGRAM

## 2024-12-30 PROCEDURE — 1123F ACP DISCUSS/DSCN MKR DOCD: CPT | Performed by: STUDENT IN AN ORGANIZED HEALTH CARE EDUCATION/TRAINING PROGRAM

## 2024-12-30 PROCEDURE — G8417 CALC BMI ABV UP PARAM F/U: HCPCS | Performed by: STUDENT IN AN ORGANIZED HEALTH CARE EDUCATION/TRAINING PROGRAM

## 2024-12-30 PROCEDURE — 1159F MED LIST DOCD IN RCRD: CPT | Performed by: STUDENT IN AN ORGANIZED HEALTH CARE EDUCATION/TRAINING PROGRAM

## 2024-12-30 PROCEDURE — 1036F TOBACCO NON-USER: CPT | Performed by: STUDENT IN AN ORGANIZED HEALTH CARE EDUCATION/TRAINING PROGRAM

## 2024-12-30 PROCEDURE — G8484 FLU IMMUNIZE NO ADMIN: HCPCS | Performed by: STUDENT IN AN ORGANIZED HEALTH CARE EDUCATION/TRAINING PROGRAM

## 2024-12-30 PROCEDURE — 99282 EMERGENCY DEPT VISIT SF MDM: CPT

## 2024-12-30 PROCEDURE — 99215 OFFICE O/P EST HI 40 MIN: CPT | Performed by: STUDENT IN AN ORGANIZED HEALTH CARE EDUCATION/TRAINING PROGRAM

## 2024-12-30 ASSESSMENT — ENCOUNTER SYMPTOMS
NAUSEA: 1
SHORTNESS OF BREATH: 1
BACK PAIN: 0
VOMITING: 0
COUGH: 0

## 2024-12-30 ASSESSMENT — PATIENT HEALTH QUESTIONNAIRE - PHQ9
1. LITTLE INTEREST OR PLEASURE IN DOING THINGS: NOT AT ALL
SUM OF ALL RESPONSES TO PHQ9 QUESTIONS 1 & 2: 0
SUM OF ALL RESPONSES TO PHQ QUESTIONS 1-9: 0
2. FEELING DOWN, DEPRESSED OR HOPELESS: NOT AT ALL
SUM OF ALL RESPONSES TO PHQ QUESTIONS 1-9: 0

## 2024-12-30 ASSESSMENT — PAIN SCALES - GENERAL: PAINLEVEL_OUTOF10: 0

## 2024-12-30 ASSESSMENT — PAIN - FUNCTIONAL ASSESSMENT: PAIN_FUNCTIONAL_ASSESSMENT: 0-10

## 2024-12-30 NOTE — ED TRIAGE NOTES
PT reports seen recently for same and has an appointment with PCP tomorrow morning and saw neurology this AM. PT is currently attempting to get BP under control. PT denies any c/o at this time.

## 2024-12-30 NOTE — PROGRESS NOTES
Latosha Todd presents today for   Chief Complaint   Patient presents with    Follow-up    Results    Migraine       Is someone accompanying this pt? Yes, daughter.    Is the patient using any DME equipment during OV? no    -DME Company: N/A    Depression Screenin/30/2024     8:54 AM   PHQ-9    Little interest or pleasure in doing things 0   Feeling down, depressed, or hopeless 0   PHQ-2 Score 0   PHQ-9 Total Score 0              Coordination of Care:  1. Have you been to the ER, urgent care clinic since your last visit? Hospitalized since your last visit? yes    2. Have you seen or consulted any other health care providers outside of the [facility] since your last visit? Include any pap smears or colon screening. no    Medication list has been updated according to patient.

## 2024-12-30 NOTE — PROGRESS NOTES
Latosha Todd is a 84 y.o. female .presents for Follow-up, Results, and Migraine    An 84 years old female patient here for follow-up of migraine.  Last seen in the clinic on November 1, 2024.  MRI of the brain and MRA of the head and neck were ordered.  Brain MRI from December 9 had shown  chronic microvascular ischemic disease with no acute infarction; no acute changes.  MRA of the head and neck showed patent intra and extracranial arteries.  Since then, she was admitted twice to the hospital.  On December 19, she was admitted to [facility] in [place] for possible CHF; elevated BNP at urgent care.  Her blood pressure was elevated.  He had elevated creatinine.  Some adjustments were made in the blood pressure medications.  She was discharged on December 22.  On the way home, patient developed slurring of speech, tremor, was not able to understand speech.  Repeat MRI of the brain was done which did not show any acute changes.  CT angiography of the head and neck showed patent intra and extracranial arteries.  Discharged with metoprolol and lisinopril.  Her blood pressure fluctuates.  She will be following with her PCP, nephrology, and cardiology.  Tremor is better currently.  No frequent headaches currently.  Had ocular symptoms when she was at [facility].  Had another episode 2 weeks prior to that.  Was associated with headache.  Nausea and heaving.  No weakness of her extremities currently.  No numbness.  She is on Eliquis for paroxysmal atrial fibrillation and DVT.      From initial encounter:  An 84 years old female patient with medical history of hypertension, hyperlipidemia, osteoarthritis referred here for evaluation of headache.  Headache started in 2018.  She was seen in the emergency room in October 2018 for occipital headache and elevated blood pressure.  Her systolic blood pressure was 200.  From the ER note, it was reported that she had dizziness and nausea.  CT scan of the

## 2024-12-31 NOTE — DISCHARGE INSTRUCTIONS
You were seen in the emergency room for your high blood pressure.  Thankfully, you did not have any other signs of a dangerous high blood pressure that we call hypertensive emergency.  One of these is hypertensive encephalopathy, where you get sweaty and confused and a headache.  Another is when you get difficulty breathing due to fluid in your lungs from your heart not being able to pump past the high blood pressure.  Over time your kidneys can get her from high blood pressure.  As long as you are not confused, having difficulty breathing or chest pain, and are urinating okay, you should be okay even if your blood pressure reads very high.  Make sure you are following up with your kidney doctor or your heart doctor or endocrinologist to get your blood pressure under control.  However, if you feel like anything is wrong, please return to the emergency room immediately.        Thank you for choosing our Emergency Department for your care.  It is our privilege to care for you in your time of need.  In the next several days, you may receive a survey via email or mailed to your home about your experience with our team.  We would greatly appreciate you taking a few minutes to complete the survey, as we use this information to learn what we have done well and what we could be doing better. Thank you for trusting us with your care!    Below you will find a list of your tests from today's visit.   Labs and Radiology Studies  No results found for this or any previous visit (from the past 12 hour(s)).  No results found.  ------------------------------------------------------------------------------------------------------------  The evaluation and treatment you received in the Emergency Department were for an urgent problem. It is important that you follow-up with a doctor, nurse practitioner, or physician assistant to:  (1) confirm your diagnosis,  (2) re-evaluation of changes in your illness and treatment, and (3) for

## 2024-12-31 NOTE — ED PROVIDER NOTES
[facility] EMERGENCY DEPT  EMERGENCY DEPARTMENT HISTORY AND PHYSICAL EXAM      Date: 12/30/2024  Patient Name: Latosha Todd  MRN: [id number]  Birthdate 1940  Date of evaluation: 12/30/2024  Provider: Caty Hector MD   Note Started: 7:18 PM EST 12/30/24    HISTORY OF PRESENT ILLNESS     Chief Complaint   Patient presents with    Hypertension       History Provided By: patient, daughter, granddaughter    HPI: Latosha Todd is a 84 y.o. female with PMH hypertension presenting with hypertension. Pt had stay at outside hospital last week for hypertensive emergency resulting encephalopathy and [place].  Blood pressure today 200 systolic and called doctor's office and reach nurse who told her to present to ER.  Patient has no complaints except for blood pressure.  Denies headache, confusion, diaphoresis, chest pain, shortness of breath.  Denies hematuria or decreased urination.  Daughter and granddaughter at bedside confirmed that patient is at baseline and has no complaints today.    PAST MEDICAL HISTORY   Past Medical History:  Past Medical History:   Diagnosis Date    Acute deep vein thrombosis (DVT) of distal vein of right lower extremity (HCC) 6/16/2020    Baker's cyst of knee     Left    Easy bruising     Essential hypertension 6/16/2020    Hypercholesteremia     Hypertension     Left arm pain     Neck pain     Osteoarthritis of both knees     Rapid pulse     Right elbow pain     Right hand pain     Right lateral epicondylitis     Swelling of joint of left wrist     Thoracic kyphosis     Cervical muscle stiffness    Wears glasses        Past Surgical History:  History reviewed. No pertinent surgical history.    Family History:  Family History   Problem Relation Age of Onset    Heart Disease Son        Social History:  Social History     Tobacco Use    Smoking status: Never    Smokeless tobacco: Never   Vaping Use    Vaping status: Never Used   Substance Use Topics    Alcohol use: Not Currently     Comment: socially

## 2025-01-03 ENCOUNTER — HOSPITAL ENCOUNTER (OUTPATIENT)
Age: 85
Setting detail: SPECIMEN
Discharge: HOME | End: 2025-01-03
Payer: MEDICARE

## 2025-01-03 LAB
ANION GAP SERPL CALC-SCNC: 5 MMOL/L (ref 3–18)
BUN SERPL-MCNC: 14 MG/DL (ref 7–18)
BUN/CREAT SERPL: 12 (ref 12–20)
CA-I BLD-MCNC: 9.2 MG/DL (ref 8.5–10.1)
CHLORIDE SERPL-SCNC: 105 MMOL/L (ref 100–111)
CO2 SERPL-SCNC: 28 MMOL/L (ref 21–32)
CREAT SERPL-MCNC: 1.13 MG/DL (ref 0.6–1.3)
GLUCOSE SERPL-MCNC: 122 MG/DL (ref 74–99)
POTASSIUM SERPL-SCNC: 4.6 MMOL/L (ref 3.5–5.5)
SODIUM SERPL-SCNC: 138 MMOL/L (ref 136–145)

## 2025-01-03 PROCEDURE — 80048 BASIC METABOLIC PNL TOTAL CA: CPT

## 2025-01-03 PROCEDURE — 36415 COLL VENOUS BLD VENIPUNCTURE: CPT

## 2025-01-23 ENCOUNTER — HOSPITAL ENCOUNTER (OUTPATIENT)
Age: 85
Setting detail: SPECIMEN
Discharge: HOME | End: 2025-01-26
Payer: MEDICARE

## 2025-01-23 LAB
ALBUMIN SERPL-MCNC: 3.4 G/DL (ref 3.4–5)
ALBUMIN/GLOB SERPL: 1.1 (ref 0.8–1.7)
ALP SERPL-CCNC: 69 U/L (ref 45–117)
ALT SERPL-CCNC: 15 U/L (ref 13–56)
ANION GAP SERPL CALC-SCNC: 6 MMOL/L (ref 3–18)
APPEARANCE UR: CLEAR
AST SERPL W P-5'-P-CCNC: 15 U/L (ref 10–38)
BACTERIA URNS QL MICRO: ABNORMAL /HPF
BASOPHILS # BLD: 0.03 K/UL (ref 0–0.1)
BASOPHILS NFR BLD: 0.9 % (ref 0–2)
BILIRUB SERPL-MCNC: 0.7 MG/DL (ref 0.2–1)
BILIRUB UR QL: NEGATIVE
BUN SERPL-MCNC: 12 MG/DL (ref 7–18)
BUN/CREAT SERPL: 12 (ref 12–20)
CA-I BLD-MCNC: 9.3 MG/DL (ref 8.5–10.1)
CHLORIDE SERPL-SCNC: 104 MMOL/L (ref 100–111)
CHOLEST SERPL-MCNC: 104 MG/DL
CO2 SERPL-SCNC: 28 MMOL/L (ref 21–32)
COLOR UR: YELLOW
CREAT SERPL-MCNC: 1.02 MG/DL (ref 0.6–1.3)
DIFFERENTIAL METHOD BLD: ABNORMAL
EOSINOPHIL # BLD: 0.12 K/UL (ref 0–0.4)
EOSINOPHIL NFR BLD: 3.7 % (ref 0–5)
EPITH CASTS URNS QL MICRO: ABNORMAL /LPF (ref 0–20)
ERYTHROCYTE [DISTWIDTH] IN BLOOD BY AUTOMATED COUNT: 14.4 % (ref 11.6–14.5)
EST. AVERAGE GLUCOSE BLD GHB EST-MCNC: 100 MG/DL
FOLATE SERPL-MCNC: >20 NG/ML (ref 3.1–17.5)
GLOBULIN SER CALC-MCNC: 3.2 G/DL (ref 2–4)
GLUCOSE SERPL-MCNC: 98 MG/DL (ref 74–99)
GLUCOSE UR STRIP.AUTO-MCNC: NEGATIVE MG/DL
HBA1C MFR BLD: 5.1 % (ref 4.2–5.6)
HCT VFR BLD AUTO: 30.6 % (ref 35–45)
HDLC SERPL-MCNC: 45 MG/DL (ref 40–60)
HDLC SERPL: 2.3 (ref 0–5)
HGB BLD-MCNC: 9.8 G/DL (ref 12–16)
HGB UR QL STRIP: NEGATIVE
IMM GRANULOCYTES # BLD AUTO: 0.05 K/UL (ref 0–0.04)
IMM GRANULOCYTES NFR BLD AUTO: 1.5 % (ref 0–0.5)
KETONES UR QL STRIP.AUTO: NEGATIVE MG/DL
LDLC SERPL CALC-MCNC: 43.6 MG/DL (ref 0–100)
LEUKOCYTE ESTERASE UR QL STRIP.AUTO: ABNORMAL
LIPID PANEL: NORMAL
LYMPHOCYTES # BLD: 0.67 K/UL (ref 0.9–3.6)
LYMPHOCYTES NFR BLD: 20.5 % (ref 21–52)
MCH RBC QN AUTO: 34.9 PG (ref 24–34)
MCHC RBC AUTO-ENTMCNC: 32 G/DL (ref 31–37)
MCV RBC AUTO: 108.9 FL (ref 78–100)
MONOCYTES # BLD: 0.28 K/UL (ref 0.05–1.2)
MONOCYTES NFR BLD: 8.6 % (ref 3–10)
NEUTS SEG # BLD: 2.12 K/UL (ref 1.8–8)
NEUTS SEG NFR BLD: 64.8 % (ref 40–73)
NITRITE UR QL STRIP.AUTO: NEGATIVE
NRBC # BLD: 0 K/UL (ref 0–0.01)
NRBC BLD-RTO: 0 PER 100 WBC
PH UR STRIP: 6.5 (ref 5–8)
PLATELET # BLD AUTO: 154 K/UL (ref 135–420)
PMV BLD AUTO: 13.3 FL (ref 9.2–11.8)
POTASSIUM SERPL-SCNC: 4.1 MMOL/L (ref 3.5–5.5)
PROT SERPL-MCNC: 6.6 G/DL (ref 6.4–8.2)
PROT UR STRIP-MCNC: NEGATIVE MG/DL
RBC # BLD AUTO: 2.81 M/UL (ref 4.2–5.3)
RBC #/AREA URNS HPF: ABNORMAL /HPF (ref 0–2)
SODIUM SERPL-SCNC: 138 MMOL/L (ref 136–145)
SP GR UR REFRACTOMETRY: 1.01 (ref 1–1.03)
TRIGL SERPL-MCNC: 77 MG/DL
UROBILINOGEN UR QL STRIP.AUTO: 0.2 EU/DL (ref 0.2–1)
VIT B12 SERPL-MCNC: 578 PG/ML (ref 211–911)
VLDLC SERPL CALC-MCNC: 15.4 MG/DL
WBC # BLD AUTO: 3.3 K/UL (ref 4.6–13.2)
WBC URNS QL MICRO: ABNORMAL /HPF (ref 0–4)

## 2025-01-23 PROCEDURE — 85025 COMPLETE CBC W/AUTO DIFF WBC: CPT

## 2025-01-23 PROCEDURE — 82746 ASSAY OF FOLIC ACID SERUM: CPT

## 2025-01-23 PROCEDURE — 36415 COLL VENOUS BLD VENIPUNCTURE: CPT

## 2025-01-23 PROCEDURE — 83036 HEMOGLOBIN GLYCOSYLATED A1C: CPT

## 2025-01-23 PROCEDURE — 81001 URINALYSIS AUTO W/SCOPE: CPT

## 2025-01-23 PROCEDURE — 80061 LIPID PANEL: CPT

## 2025-01-23 PROCEDURE — 80053 COMPREHEN METABOLIC PANEL: CPT

## 2025-01-23 PROCEDURE — 82607 VITAMIN B-12: CPT

## 2025-05-28 ENCOUNTER — TRANSCRIBE ORDERS (OUTPATIENT)
Facility: HOSPITAL | Age: 85
End: 2025-05-28

## 2025-05-28 DIAGNOSIS — Z12.31 ENCOUNTER FOR SCREENING MAMMOGRAM FOR MALIGNANT NEOPLASM OF BREAST: Primary | ICD-10-CM

## 2025-05-28 DIAGNOSIS — M85.80 OSTEOPENIA, UNSPECIFIED LOCATION: ICD-10-CM

## 2025-07-09 ENCOUNTER — OFFICE VISIT (OUTPATIENT)
Age: 85
End: 2025-07-09
Payer: MEDICARE

## 2025-07-09 VITALS
OXYGEN SATURATION: 98 % | BODY MASS INDEX: 28.67 KG/M2 | HEART RATE: 69 BPM | WEIGHT: 167 LBS | SYSTOLIC BLOOD PRESSURE: 165 MMHG | TEMPERATURE: 96.4 F | DIASTOLIC BLOOD PRESSURE: 73 MMHG

## 2025-07-09 DIAGNOSIS — H53.8 BLURRING OF VISION: ICD-10-CM

## 2025-07-09 DIAGNOSIS — G43.109 MIGRAINE WITH AURA AND WITHOUT STATUS MIGRAINOSUS, NOT INTRACTABLE: Primary | ICD-10-CM

## 2025-07-09 PROCEDURE — 1036F TOBACCO NON-USER: CPT | Performed by: STUDENT IN AN ORGANIZED HEALTH CARE EDUCATION/TRAINING PROGRAM

## 2025-07-09 PROCEDURE — G8417 CALC BMI ABV UP PARAM F/U: HCPCS | Performed by: STUDENT IN AN ORGANIZED HEALTH CARE EDUCATION/TRAINING PROGRAM

## 2025-07-09 PROCEDURE — 1123F ACP DISCUSS/DSCN MKR DOCD: CPT | Performed by: STUDENT IN AN ORGANIZED HEALTH CARE EDUCATION/TRAINING PROGRAM

## 2025-07-09 PROCEDURE — 99213 OFFICE O/P EST LOW 20 MIN: CPT | Performed by: STUDENT IN AN ORGANIZED HEALTH CARE EDUCATION/TRAINING PROGRAM

## 2025-07-09 PROCEDURE — G8428 CUR MEDS NOT DOCUMENT: HCPCS | Performed by: STUDENT IN AN ORGANIZED HEALTH CARE EDUCATION/TRAINING PROGRAM

## 2025-07-09 PROCEDURE — 1090F PRES/ABSN URINE INCON ASSESS: CPT | Performed by: STUDENT IN AN ORGANIZED HEALTH CARE EDUCATION/TRAINING PROGRAM

## 2025-07-09 RX ORDER — SIMVASTATIN 20 MG
20 TABLET ORAL
COMMUNITY
Start: 2025-01-17

## 2025-07-09 RX ORDER — FUROSEMIDE 20 MG/1
20 TABLET ORAL DAILY PRN
COMMUNITY

## 2025-07-09 RX ORDER — ESCITALOPRAM OXALATE 10 MG/1
10 TABLET ORAL DAILY
COMMUNITY
Start: 2025-03-03

## 2025-07-09 ASSESSMENT — ENCOUNTER SYMPTOMS
COUGH: 0
SHORTNESS OF BREATH: 0
BACK PAIN: 0
VOMITING: 0
NAUSEA: 0

## 2025-07-09 NOTE — PROGRESS NOTES
Nonthrobbing.  Associated with nausea with vomiting.  Had photophobia and phonophobia.  CT scan of the brain did not show any acute changes.  Her blood pressure was also very high.  She was taking bisoprolol, Aldactone, lisinopril.  Since June, she had 4 episodes of headache/blurring of vision.  She had no history of migraine headaches when she was younger.  Vision changes are not associated with extremity weakness, sensory symptoms, or changes in speech.  Walks without support; might feel unsteady.  Family history includes son with history of migraine; daughter with migraine before menopause.    Migraine   Associated symptoms include hearing loss (hearing aides) and neck pain (sometimes). Pertinent negatives include no back pain, coughing, dizziness, fever, nausea (with headache), numbness, seizures, tinnitus, vomiting or weakness.       Review of Systems   Constitutional:  Positive for unexpected weight change (from diuretics). Negative for chills (might feel chilly) and fever.   HENT:  Positive for hearing loss (hearing aides). Negative for tinnitus.    Eyes:  Positive for visual disturbance (has difficulty at distance; has reading glass).   Respiratory:  Negative for cough and shortness of breath.    Cardiovascular:  Negative for chest pain and leg swelling.   Gastrointestinal:  Negative for nausea (with headache) and vomiting.   Genitourinary:  Negative for dysuria, frequency (occasionally) and urgency.   Musculoskeletal:  Positive for neck pain (sometimes). Negative for back pain.   Skin:  Negative for rash (Taking meds for Psoriasis).   Neurological:  Positive for tremors (soemtimes) and headaches (not a lot recently). Negative for dizziness, seizures, syncope, facial asymmetry, speech difficulty, weakness and numbness.         Past Medical History:   Diagnosis Date    Acute deep vein thrombosis (DVT) of distal vein of right lower extremity (HCC) 6/16/2020    Baker's cyst of knee     Left    Easy bruising